# Patient Record
Sex: MALE | Race: BLACK OR AFRICAN AMERICAN | NOT HISPANIC OR LATINO | Employment: OTHER | URBAN - METROPOLITAN AREA
[De-identification: names, ages, dates, MRNs, and addresses within clinical notes are randomized per-mention and may not be internally consistent; named-entity substitution may affect disease eponyms.]

---

## 2017-10-01 ENCOUNTER — OFFICE VISIT (OUTPATIENT)
Dept: URGENT CARE | Facility: CLINIC | Age: 72
End: 2017-10-01
Payer: MEDICARE

## 2017-10-01 VITALS
TEMPERATURE: 98 F | DIASTOLIC BLOOD PRESSURE: 77 MMHG | WEIGHT: 235 LBS | BODY MASS INDEX: 31.83 KG/M2 | SYSTOLIC BLOOD PRESSURE: 131 MMHG | OXYGEN SATURATION: 97 % | HEIGHT: 72 IN | HEART RATE: 96 BPM | RESPIRATION RATE: 18 BRPM

## 2017-10-01 DIAGNOSIS — J40 BRONCHITIS: Primary | ICD-10-CM

## 2017-10-01 PROCEDURE — 99203 OFFICE O/P NEW LOW 30 MIN: CPT | Mod: S$GLB,,, | Performed by: PHYSICIAN ASSISTANT

## 2017-10-01 RX ORDER — CEFUROXIME AXETIL 500 MG/1
500 TABLET ORAL 2 TIMES DAILY
COMMUNITY

## 2017-10-01 RX ORDER — PROMETHAZINE HYDROCHLORIDE 12.5 MG/1
12.5 SUPPOSITORY RECTAL EVERY 6 HOURS PRN
COMMUNITY
End: 2022-07-26

## 2017-10-01 RX ORDER — CIPROFLOXACIN 500 MG/5ML
KIT ORAL 2 TIMES DAILY
COMMUNITY

## 2017-10-01 RX ORDER — PROMETHAZINE HYDROCHLORIDE AND CODEINE PHOSPHATE 6.25; 1 MG/5ML; MG/5ML
5 SOLUTION ORAL EVERY 6 HOURS PRN
Qty: 150 ML | Refills: 0 | Status: SHIPPED | OUTPATIENT
Start: 2017-10-01 | End: 2017-10-11

## 2017-10-01 RX ORDER — NAPROXEN 375 MG/1
375 TABLET ORAL 2 TIMES DAILY WITH MEALS
COMMUNITY

## 2017-10-01 RX ORDER — METHYLPREDNISOLONE 4 MG/1
TABLET ORAL
Qty: 1 PACKAGE | Refills: 0 | Status: SHIPPED | OUTPATIENT
Start: 2017-10-01 | End: 2024-03-25

## 2017-10-01 RX ORDER — BENZONATATE 200 MG/1
200 CAPSULE ORAL 3 TIMES DAILY PRN
Qty: 30 CAPSULE | Refills: 0 | Status: SHIPPED | OUTPATIENT
Start: 2017-10-01 | End: 2017-10-11

## 2017-10-01 NOTE — PATIENT INSTRUCTIONS
- Please return here or go to the Emergency Department for any concerns or worsening of condition.   - If you were prescribed a narcotic medication, do not drive or operate heavy equipment or machinery while taking these medications.  - Please follow up with your primary care provider (PCP) or discussed specialist(s) as needed.         Acute Bronchitis  Your healthcare provider has told you that you have acute bronchitis. Bronchitis is infection or inflammation of the bronchial tubes (airways in the lungs). Normally, air moves easily in and out of the airways. Bronchitis narrows the airways, making it harder for air to flow in and out of the lungs. This causes symptoms such as shortness of breath, coughing up yellow or green mucus, and wheezing. Bronchitis can be acute or chronic. Acute means the condition comes on quickly and goes away in a short time, usually within 3 to 10 days. Chronic means a condition lasts a long time and often comes back.    What causes acute bronchitis?  Acute bronchitis almost always starts as a viral respiratory infection, such as a cold or the flu. Certain factors make it more likely for a cold or flu to turn into bronchitis. These include being very young, being elderly, having a heart or lung problem, or having a weak immune system. Cigarette smoking also makes bronchitis more likely.  When bronchitis develops, the airways become swollen. The airways may also become infected with bacteria. This is known as a secondary infection.  Diagnosing acute bronchitis  Your healthcare provider will examine you and ask about your symptoms and health history. You may also have a sputum culture to test the fluid in your lungs. Chest X-rays may be done to look for infection in the lungs.  Treating acute bronchitis  Bronchitis usually clears up as the cold or flu goes away. You can help feel better faster by doing the following:  · Take medicine as directed. You may be told to take ibuprofen or other  over-the-counter medicines. These help relieve inflammation in your bronchial tubes. Your healthcare provider may prescribe an inhaler to help open up the bronchial tubes. Most of the time, acute bronchitis is caused by a viral infection. Antibiotics are usually not prescribed for viral infections.  · Drink plenty of fluids, such as water, juice, or warm soup. Fluids loosen mucus so that you can cough it up. This helps you breathe more easily. Fluids also prevent dehydration.  · Make sure you get plenty of rest.  · Do not smoke. Do not allow anyone else to smoke in your home.  Recovery and follow-up  Follow up with your doctor as you are told. You will likely feel better in a week or two. But a dry cough can linger beyond that time. Let your doctor know if you still have symptoms (other than a dry cough) after 2 weeks, or if youre prone to getting bronchial infections. Take steps to protect yourself from future infections. These steps include stopping smoking and avoiding tobacco smoke, washing your hands often, and getting a yearly flu shot.  When to call your healthcare provider  Call the healthcare provider if you have any of the following:  · Fever of 100.4°F (38.0°C) or higher, or as advised  · Symptoms that get worse, or new symptoms  · Trouble breathing  · Symptoms that dont start to improve within a week, or within 3 days of taking antibiotics   Date Last Reviewed: 12/1/2016  © 9466-9054 [employer]. [address] [place] [address], [place] [address]. All rights reserved. This information is not intended as a substitute for professional medical care. Always follow your healthcare professional's instructions.

## 2017-10-01 NOTE — PROGRESS NOTES
Subjective:       Patient ID: Franko Smith is a 72 y.o. male.    Chief Complaint: Cough (2 weeks )    PT SEEN HIS PCP AND WAS GIVEN CIPRO AND COUGH MEDS  NOW HES TAKING CEFTIN AFTER THE CIPRO WASN'T WORKING       Cough   This is a new problem. The current episode started 1 to 4 weeks ago. The cough is productive of sputum. Associated symptoms include a sore throat and shortness of breath. Pertinent negatives include no chest pain, chills, ear pain, eye redness, fever, headaches, myalgias, rash or wheezing. He has tried prescription cough suppressant for the symptoms. The treatment provided no relief. There is no history of asthma, bronchitis or pneumonia.     Review of Systems   Constitution: Negative for chills, fever and malaise/fatigue.   HENT: Positive for sore throat. Negative for congestion, ear pain and hoarse voice.    Eyes: Negative for blurred vision, discharge, pain, redness and visual disturbance.   Cardiovascular: Negative for chest pain, dyspnea on exertion, leg swelling, near-syncope and syncope.   Respiratory: Positive for cough, shortness of breath and sputum production. Negative for wheezing.    Hematologic/Lymphatic: Negative for adenopathy.   Skin: Negative for itching and rash.   Musculoskeletal: Negative for back pain, myalgias, neck pain and stiffness.   Gastrointestinal: Negative for abdominal pain, diarrhea, nausea and vomiting.   Neurological: Negative for dizziness, headaches, light-headedness and numbness.   Psychiatric/Behavioral: Negative for altered mental status.   Allergic/Immunologic: Negative for hives.   All other systems reviewed and are negative.      Objective:      Physical Exam   Constitutional: He is oriented to person, place, and time. He appears well-developed and well-nourished.  Non-toxic appearance. He does not have a sickly appearance. He does not appear ill. No distress.   HENT:   Head: Normocephalic and atraumatic.   Right Ear: Tympanic membrane, external ear and  ear canal normal.   Left Ear: Tympanic membrane, external ear and ear canal normal.   Nose: No mucosal edema. No epistaxis. Right sinus exhibits no maxillary sinus tenderness and no frontal sinus tenderness. Left sinus exhibits no maxillary sinus tenderness and no frontal sinus tenderness.   Mouth/Throat: Uvula is midline and mucous membranes are normal. No uvula swelling. Posterior oropharyngeal erythema (mild) present. No oropharyngeal exudate or posterior oropharyngeal edema.   Eyes: Pupils are equal, round, and reactive to light.   Neck: Normal range of motion. Neck supple.   Cardiovascular: Normal rate, regular rhythm and normal heart sounds.  Exam reveals no gallop and no friction rub.    No murmur heard.  Pulmonary/Chest: Effort normal. No respiratory distress. He has no decreased breath sounds. He has no wheezes. He has rhonchi in the right middle field and the left middle field. He has no rales. He exhibits no tenderness and no crepitus.   Musculoskeletal: Normal range of motion.   Lymphadenopathy:        Head (right side): No submental, no submandibular, no tonsillar, no preauricular, no posterior auricular and no occipital adenopathy present.        Head (left side): No submental, no submandibular, no tonsillar, no preauricular, no posterior auricular and no occipital adenopathy present.     He has no cervical adenopathy.        Right cervical: No posterior cervical adenopathy present.       Left cervical: No posterior cervical adenopathy present.        Right: No supraclavicular adenopathy present.        Left: No supraclavicular adenopathy present.   Neurological: He is alert and oriented to person, place, and time. He is not disoriented. Coordination and gait normal.   Skin: No abrasion, no ecchymosis, no laceration and no rash noted. No erythema.   Psychiatric: He has a normal mood and affect. His behavior is normal.   Nursing note and vitals reviewed.      /77   Pulse 96   Temp 97.8 °F (36.6  °C)   Resp 18   Ht 6' (1.829 m)   Wt 106.6 kg (235 lb)   SpO2 97%   BMI 31.87 kg/m²      COMPARISON: None    FINDINGS: Two views of the chest. Pulmonary vasculature and hilar regions are within normal limits. Nonspecific elevation of the right hemidiaphragm. Bibasilar few scattered linear opacities consistent with subsegmental scarring versus atelectasis. The lungs are otherwise clear. No pleural effusion or pneumothorax. The heart and mediastinal contours are within normal limits for age. Included osseous structures show age-related degenerative change without acute process seen.   Impression   No radiographic acute intrathoracic process seen.      Electronically signed by: SERGE CARR MD, MD  Date: 10/01/17  Time: 12:05      Assessment:       1. Bronchitis        Plan:       Franko was seen today for cough.    Diagnoses and all orders for this visit:    Bronchitis  -     X-Ray Chest PA And Lateral; Future  -     methylPREDNISolone (MEDROL DOSEPACK) 4 mg tablet; use as directed  -     promethazine-codeine 6.25-10 mg/5 ml (PHENERGAN WITH CODEINE) 6.25-10 mg/5 mL syrup; Take 5 mLs by mouth every 6 (six) hours as needed for Cough.  -     benzonatate (TESSALON) 200 MG capsule; Take 1 capsule (200 mg total) by mouth 3 (three) times daily as needed for Cough.    - Please return here or go to the Emergency Department for any concerns or worsening of condition.   - If you were prescribed a narcotic medication, do not drive or operate heavy equipment or machinery while taking these medications.  - Please follow up with your primary care provider (PCP) or discussed specialist(s) as needed.

## 2017-10-04 ENCOUNTER — TELEPHONE (OUTPATIENT)
Dept: URGENT CARE | Facility: CLINIC | Age: 72
End: 2017-10-04

## 2022-07-26 ENCOUNTER — TELEPHONE (OUTPATIENT)
Dept: URGENT CARE | Facility: CLINIC | Age: 77
End: 2022-07-26

## 2022-07-26 ENCOUNTER — OFFICE VISIT (OUTPATIENT)
Dept: URGENT CARE | Facility: CLINIC | Age: 77
End: 2022-07-26
Payer: MEDICARE

## 2022-07-26 VITALS
OXYGEN SATURATION: 98 % | SYSTOLIC BLOOD PRESSURE: 120 MMHG | HEIGHT: 72 IN | TEMPERATURE: 97 F | BODY MASS INDEX: 31.83 KG/M2 | RESPIRATION RATE: 16 BRPM | WEIGHT: 235 LBS | HEART RATE: 87 BPM | DIASTOLIC BLOOD PRESSURE: 72 MMHG

## 2022-07-26 DIAGNOSIS — B34.9 ACUTE VIRAL SYNDROME: Primary | ICD-10-CM

## 2022-07-26 DIAGNOSIS — Z20.822 CLOSE EXPOSURE TO COVID-19 VIRUS: ICD-10-CM

## 2022-07-26 PROBLEM — F40.240 CLAUSTROPHOBIA: Status: ACTIVE | Noted: 2022-07-26

## 2022-07-26 PROBLEM — H93.19 TINNITUS: Status: ACTIVE | Noted: 2022-07-26

## 2022-07-26 PROBLEM — H90.5 SENSORINEURAL HEARING LOSS, UNILATERAL: Status: ACTIVE | Noted: 2022-07-26

## 2022-07-26 PROBLEM — N40.0 BENIGN PROSTATIC HYPERPLASIA: Status: ACTIVE | Noted: 2022-07-26

## 2022-07-26 PROBLEM — F41.9 ANXIETY DISORDER: Status: ACTIVE | Noted: 2022-07-26

## 2022-07-26 PROBLEM — G47.30 SLEEP APNEA: Status: ACTIVE | Noted: 2022-07-26

## 2022-07-26 LAB
CTP QC/QA: YES
SARS-COV-2 RDRP RESP QL NAA+PROBE: NEGATIVE

## 2022-07-26 PROCEDURE — 1159F PR MEDICATION LIST DOCUMENTED IN MEDICAL RECORD: ICD-10-PCS | Mod: CPTII,S$GLB,, | Performed by: STUDENT IN AN ORGANIZED HEALTH CARE EDUCATION/TRAINING PROGRAM

## 2022-07-26 PROCEDURE — 3074F PR MOST RECENT SYSTOLIC BLOOD PRESSURE < 130 MM HG: ICD-10-PCS | Mod: CPTII,S$GLB,, | Performed by: STUDENT IN AN ORGANIZED HEALTH CARE EDUCATION/TRAINING PROGRAM

## 2022-07-26 PROCEDURE — 1160F PR REVIEW ALL MEDS BY PRESCRIBER/CLIN PHARMACIST DOCUMENTED: ICD-10-PCS | Mod: CPTII,S$GLB,, | Performed by: STUDENT IN AN ORGANIZED HEALTH CARE EDUCATION/TRAINING PROGRAM

## 2022-07-26 PROCEDURE — 99214 OFFICE O/P EST MOD 30 MIN: CPT | Mod: S$GLB,,, | Performed by: STUDENT IN AN ORGANIZED HEALTH CARE EDUCATION/TRAINING PROGRAM

## 2022-07-26 PROCEDURE — 3078F PR MOST RECENT DIASTOLIC BLOOD PRESSURE < 80 MM HG: ICD-10-PCS | Mod: CPTII,S$GLB,, | Performed by: STUDENT IN AN ORGANIZED HEALTH CARE EDUCATION/TRAINING PROGRAM

## 2022-07-26 PROCEDURE — 1125F PR PAIN SEVERITY QUANTIFIED, PAIN PRESENT: ICD-10-PCS | Mod: CPTII,S$GLB,, | Performed by: STUDENT IN AN ORGANIZED HEALTH CARE EDUCATION/TRAINING PROGRAM

## 2022-07-26 PROCEDURE — 3078F DIAST BP <80 MM HG: CPT | Mod: CPTII,S$GLB,, | Performed by: STUDENT IN AN ORGANIZED HEALTH CARE EDUCATION/TRAINING PROGRAM

## 2022-07-26 PROCEDURE — 3074F SYST BP LT 130 MM HG: CPT | Mod: CPTII,S$GLB,, | Performed by: STUDENT IN AN ORGANIZED HEALTH CARE EDUCATION/TRAINING PROGRAM

## 2022-07-26 PROCEDURE — U0002: ICD-10-PCS | Mod: QW,S$GLB,, | Performed by: STUDENT IN AN ORGANIZED HEALTH CARE EDUCATION/TRAINING PROGRAM

## 2022-07-26 PROCEDURE — 1159F MED LIST DOCD IN RCRD: CPT | Mod: CPTII,S$GLB,, | Performed by: STUDENT IN AN ORGANIZED HEALTH CARE EDUCATION/TRAINING PROGRAM

## 2022-07-26 PROCEDURE — 1160F RVW MEDS BY RX/DR IN RCRD: CPT | Mod: CPTII,S$GLB,, | Performed by: STUDENT IN AN ORGANIZED HEALTH CARE EDUCATION/TRAINING PROGRAM

## 2022-07-26 PROCEDURE — U0002 COVID-19 LAB TEST NON-CDC: HCPCS | Mod: QW,S$GLB,, | Performed by: STUDENT IN AN ORGANIZED HEALTH CARE EDUCATION/TRAINING PROGRAM

## 2022-07-26 PROCEDURE — 1125F AMNT PAIN NOTED PAIN PRSNT: CPT | Mod: CPTII,S$GLB,, | Performed by: STUDENT IN AN ORGANIZED HEALTH CARE EDUCATION/TRAINING PROGRAM

## 2022-07-26 PROCEDURE — 99214 PR OFFICE/OUTPT VISIT, EST, LEVL IV, 30-39 MIN: ICD-10-PCS | Mod: S$GLB,,, | Performed by: STUDENT IN AN ORGANIZED HEALTH CARE EDUCATION/TRAINING PROGRAM

## 2022-07-26 RX ORDER — ONDANSETRON 4 MG/1
4 TABLET, FILM COATED ORAL EVERY 6 HOURS PRN
Qty: 15 TABLET | Refills: 0 | Status: SHIPPED | OUTPATIENT
Start: 2022-07-26 | End: 2022-07-26 | Stop reason: RX

## 2022-07-26 RX ORDER — ONDANSETRON 4 MG/1
4 TABLET, ORALLY DISINTEGRATING ORAL
Status: COMPLETED | OUTPATIENT
Start: 2022-07-26 | End: 2022-07-26

## 2022-07-26 RX ORDER — PROMETHAZINE HYDROCHLORIDE 12.5 MG/1
12.5 TABLET ORAL EVERY 6 HOURS PRN
Qty: 15 TABLET | Refills: 0 | Status: SHIPPED | OUTPATIENT
Start: 2022-07-26

## 2022-07-26 RX ADMIN — ONDANSETRON 4 MG: 4 TABLET, ORALLY DISINTEGRATING ORAL at 12:07

## 2022-07-26 NOTE — PATIENT INSTRUCTIONS
Below are suggestions for symptomatic relief of your upper respiratory symptoms:              -Salt water gargles to soothe throat pain.              -Chloroseptic spray and Cepacol lozenges also help to numb throat pain.              -Warm herbal teas with honey/lemon/judi can help soothe sore throat and hoarseness              -Nasal saline spray reduces inflammation and dryness.              -Warm face compresses to help with facial sinus pain/pressure.              -Humidifiers and steam can help with nasal dryness and congestion              -Vicks vapor rub at night.              -Flonase OTC or Nasacort OTC for nasal congestion and post-nasal drip. Ok to use twice daily for the first week, then reduce to once daily after symptoms have begun to improve.              -Afrin is a nasal spray that can give immediate relief of nasal congestion but you cannot use this medication for more than 3 days              -Simple foods like chicken noodle soup.              - Mucinex for congestion or Mucinex DM for cough during the day time. Delsym helps with coughing at night. Mucinex-D if you have sinus pressure/sinus pain or chest congestion. (caution if history of high blood pressure or palpitations). You must increase your water intake when using expectorants (Mucinex).              -Zyrtec/Claritin/Allegra/Xyzal should help with allergies.  -If you DO NOT have Hypertension or any history of palpitations, it is ok to take over the counter Sudafed or Mucinex D or Allegra-D or Claritin-D or Zyrtec-D.  -If you do take one of the above, it is ok to combine that with plain over the counter Mucinex or Allegra or Claritin or Zyrtec. If, for example, you are taking Zyrtec -D, you can combine that with Mucinex, but not Mucinex-D.  If you are taking Mucinex-D, you can combine that with plain Allegra or Claritin or Zyrtec.   -If you DO have Hypertension or palpitations, it is safe to take Coricidin HBP for relief of sinus  symptoms.      Your test was NEGATIVE for COVID-19 (coronavirus).      You may leave home and/or return to work when the following conditions are met:  24 hours fever free without fever-reducing medications AND  Improved symptoms  You are fully vaccinated or have not had close contact with someone with COVID-19 (within 6 feet for 15 minutes or more)    If you are fully vaccinated and had a close contact, there is no need for quarantine, unless you develop symptoms. Test 3-5 days after exposure and continue to wear your mask and distance from others. If you develop symptoms, you should isolate and get tested at symptom onset or 3-5 days post exposure.      If you are not fully vaccinated and had a close contact:  Retest at 5 to 7 days post-exposure  If possible, it is recommended that you quarantine for 5 days from the time of contact regardless of your test status.  A mask should be worn indoors post quarantine for 5 more days.    If your symptoms worsen or if you have any other concerns, please contact [facility] at [phone].

## 2022-07-26 NOTE — TELEPHONE ENCOUNTER
Called patient let know that a new medication was sent in since the zofran required a prior auth.

## 2022-07-28 ENCOUNTER — OFFICE VISIT (OUTPATIENT)
Dept: URGENT CARE | Facility: CLINIC | Age: 77
End: 2022-07-28
Payer: MEDICARE

## 2022-07-28 VITALS
BODY MASS INDEX: 31.83 KG/M2 | OXYGEN SATURATION: 99 % | WEIGHT: 235 LBS | TEMPERATURE: 98 F | SYSTOLIC BLOOD PRESSURE: 118 MMHG | HEIGHT: 72 IN | HEART RATE: 88 BPM | DIASTOLIC BLOOD PRESSURE: 73 MMHG | RESPIRATION RATE: 17 BRPM

## 2022-07-28 DIAGNOSIS — R53.83 FATIGUE, UNSPECIFIED TYPE: ICD-10-CM

## 2022-07-28 DIAGNOSIS — J02.9 SORE THROAT: ICD-10-CM

## 2022-07-28 DIAGNOSIS — R09.82 POST-NASAL DRIP: ICD-10-CM

## 2022-07-28 DIAGNOSIS — Z86.69 HISTORY OF SLEEP APNEA: ICD-10-CM

## 2022-07-28 DIAGNOSIS — R51.9 ACUTE NONINTRACTABLE HEADACHE, UNSPECIFIED HEADACHE TYPE: ICD-10-CM

## 2022-07-28 DIAGNOSIS — R11.0 NAUSEA: ICD-10-CM

## 2022-07-28 DIAGNOSIS — R05.9 COUGH: Primary | ICD-10-CM

## 2022-07-28 DIAGNOSIS — J34.3 HYPERTROPHY OF NASAL TURBINATES: ICD-10-CM

## 2022-07-28 PROBLEM — E65 CENTRAL OBESITY: Status: ACTIVE | Noted: 2022-07-28

## 2022-07-28 PROBLEM — K76.0 FATTY LIVER: Status: ACTIVE | Noted: 2022-07-28

## 2022-07-28 PROBLEM — I10 PRIMARY HYPERTENSION: Status: ACTIVE | Noted: 2022-07-28

## 2022-07-28 PROBLEM — E11.40 TYPE 2 DIABETES MELLITUS WITH DIABETIC NEUROPATHY, UNSPECIFIED: Status: ACTIVE | Noted: 2022-07-28

## 2022-07-28 PROBLEM — B35.1 TINEA UNGUIUM: Status: ACTIVE | Noted: 2022-07-28

## 2022-07-28 PROBLEM — E11.59 TYPE 2 DIABETES MELLITUS WITH OTHER CIRCULATORY COMPLICATIONS: Status: ACTIVE | Noted: 2022-07-28

## 2022-07-28 PROBLEM — Z23 ENCOUNTER FOR IMMUNIZATION: Status: ACTIVE | Noted: 2022-07-28

## 2022-07-28 PROBLEM — E11.9 TYPE 2 DIABETES MELLITUS WITHOUT COMPLICATIONS: Status: ACTIVE | Noted: 2022-07-28

## 2022-07-28 PROBLEM — Z76.0 ENCOUNTER FOR ISSUE OF REPEAT PRESCRIPTION: Status: ACTIVE | Noted: 2022-07-28

## 2022-07-28 PROBLEM — K76.0 FATTY (CHANGE OF) LIVER, NOT ELSEWHERE CLASSIFIED: Status: ACTIVE | Noted: 2022-07-28

## 2022-07-28 PROBLEM — F41.1 GENERALIZED ANXIETY DISORDER: Status: ACTIVE | Noted: 2022-07-28

## 2022-07-28 PROBLEM — H60.91 UNSPECIFIED OTITIS EXTERNA, RIGHT EAR: Status: ACTIVE | Noted: 2022-07-28

## 2022-07-28 PROBLEM — E11.9 DIABETES MELLITUS: Status: ACTIVE | Noted: 2022-07-28

## 2022-07-28 PROBLEM — R60.9 EDEMA, UNSPECIFIED: Status: ACTIVE | Noted: 2022-07-28

## 2022-07-28 PROBLEM — E78.5 HYPERLIPIDEMIA: Status: ACTIVE | Noted: 2022-07-28

## 2022-07-28 PROBLEM — F63.0 COMPULSIVE GAMBLING: Status: ACTIVE | Noted: 2022-07-28

## 2022-07-28 PROBLEM — E11.3291: Status: ACTIVE | Noted: 2022-07-28

## 2022-07-28 PROBLEM — H47.091 OTHER DISORDERS OF OPTIC NERVE, NOT ELSEWHERE CLASSIFIED, RIGHT EYE: Status: ACTIVE | Noted: 2022-07-28

## 2022-07-28 PROBLEM — K21.9 GASTRO-ESOPHAGEAL REFLUX DISEASE WITHOUT ESOPHAGITIS: Status: ACTIVE | Noted: 2022-07-28

## 2022-07-28 LAB
CTP QC/QA: YES
SARS-COV-2 RDRP RESP QL NAA+PROBE: NEGATIVE

## 2022-07-28 PROCEDURE — 3074F PR MOST RECENT SYSTOLIC BLOOD PRESSURE < 130 MM HG: ICD-10-PCS | Mod: CPTII,S$GLB,, | Performed by: NURSE PRACTITIONER

## 2022-07-28 PROCEDURE — 1160F PR REVIEW ALL MEDS BY PRESCRIBER/CLIN PHARMACIST DOCUMENTED: ICD-10-PCS | Mod: CPTII,S$GLB,, | Performed by: NURSE PRACTITIONER

## 2022-07-28 PROCEDURE — 1125F PR PAIN SEVERITY QUANTIFIED, PAIN PRESENT: ICD-10-PCS | Mod: CPTII,S$GLB,, | Performed by: NURSE PRACTITIONER

## 2022-07-28 PROCEDURE — U0002: ICD-10-PCS | Mod: QW,S$GLB,, | Performed by: NURSE PRACTITIONER

## 2022-07-28 PROCEDURE — 1159F MED LIST DOCD IN RCRD: CPT | Mod: CPTII,S$GLB,, | Performed by: NURSE PRACTITIONER

## 2022-07-28 PROCEDURE — 3078F DIAST BP <80 MM HG: CPT | Mod: CPTII,S$GLB,, | Performed by: NURSE PRACTITIONER

## 2022-07-28 PROCEDURE — 99213 OFFICE O/P EST LOW 20 MIN: CPT | Mod: S$GLB,,, | Performed by: NURSE PRACTITIONER

## 2022-07-28 PROCEDURE — 1159F PR MEDICATION LIST DOCUMENTED IN MEDICAL RECORD: ICD-10-PCS | Mod: CPTII,S$GLB,, | Performed by: NURSE PRACTITIONER

## 2022-07-28 PROCEDURE — 1160F RVW MEDS BY RX/DR IN RCRD: CPT | Mod: CPTII,S$GLB,, | Performed by: NURSE PRACTITIONER

## 2022-07-28 PROCEDURE — 1125F AMNT PAIN NOTED PAIN PRSNT: CPT | Mod: CPTII,S$GLB,, | Performed by: NURSE PRACTITIONER

## 2022-07-28 PROCEDURE — 99213 PR OFFICE/OUTPT VISIT, EST, LEVL III, 20-29 MIN: ICD-10-PCS | Mod: S$GLB,,, | Performed by: NURSE PRACTITIONER

## 2022-07-28 PROCEDURE — U0002 COVID-19 LAB TEST NON-CDC: HCPCS | Mod: QW,S$GLB,, | Performed by: NURSE PRACTITIONER

## 2022-07-28 PROCEDURE — 3078F PR MOST RECENT DIASTOLIC BLOOD PRESSURE < 80 MM HG: ICD-10-PCS | Mod: CPTII,S$GLB,, | Performed by: NURSE PRACTITIONER

## 2022-07-28 PROCEDURE — 3074F SYST BP LT 130 MM HG: CPT | Mod: CPTII,S$GLB,, | Performed by: NURSE PRACTITIONER

## 2022-07-28 RX ORDER — FLUTICASONE PROPIONATE 50 MCG
2 SPRAY, SUSPENSION (ML) NASAL DAILY PRN
Qty: 15.8 ML | Refills: 0 | Status: SHIPPED | OUTPATIENT
Start: 2022-07-28

## 2022-07-28 NOTE — PROGRESS NOTES
Subjective:       Patient ID: Franko Smith is a 76 y.o. male.    Vitals:  height is 6' (1.829 m) and weight is 106.6 kg (235 lb). His temperature is 98.1 °F (36.7 °C). His blood pressure is 118/73 and his pulse is 88. His respiration is 17 and oxygen saturation is 99%.     Chief Complaint: Cough    This is a 76 y.o. male who presents today with a chief complaint of cough, sore throat, fatigue, headaches, and upset stomach x 2 days ago. Pt states that they came here on 07/26 to get tested for covid and it was Negative. They were told to come back to get re tested. Treated with tylenol.       76-year-old male presents to clinic with complaints of cough, sore throat, fatigue, headache and nausea. Urgent care visit 2 days ago with complaints of sore throat, diarrhea, vomiting, nausea, throat pain and headache that started 4 hours prior to urgent care visit and was diagnosed with acute viral syndrome. Treated with Phenergan 12.5 mg every 6 hours # 15 tablets. He reported his spouse tested positive for covid 2 weeks prior to his visit to urgent care.  History positive for Pfizer covid vaccine x 4 doses 1/22/21, 2/12/21, 10/6/21, 4/5/22, sleep apnea, GERD    Cough  This is a new problem. The current episode started in the past 7 days. The problem has been gradually worsening. The problem occurs constantly. The cough is productive of sputum. Associated symptoms include headaches, postnasal drip and a sore throat. Pertinent negatives include no chills, fever, myalgias, nasal congestion or rash. Associated symptoms comments: Upset stomach, fatigue. Treatments tried: tylenol. The treatment provided mild relief. There is no history of asthma, bronchitis or COPD.       Constitution: Positive for fatigue. Negative for chills, sweating and fever.   HENT: Positive for congestion, postnasal drip and sore throat.    Neck: Negative for neck pain and painful lymph nodes.   Respiratory: Positive for sleep apnea and cough.         Not  using CPAP   Gastrointestinal: Positive for nausea.   Musculoskeletal: Negative for muscle ache.   Skin: Negative for rash.   Allergic/Immunologic: Negative for seasonal allergies and sneezing.   Neurological: Positive for headaches. Negative for disorientation and altered mental status.   Hematologic/Lymphatic: Negative for swollen lymph nodes.   Psychiatric/Behavioral: Negative for altered mental status, disorientation and confusion.       Objective:      Physical Exam   Constitutional: He is oriented to person, place, and time. He appears well-developed. He is cooperative.  Non-toxic appearance. He does not appear ill. No distress. obesity  HENT:   Head: Normocephalic and atraumatic.   Ears:   Right Ear: Hearing, tympanic membrane, external ear and ear canal normal.   Left Ear: Hearing, tympanic membrane, external ear and ear canal normal.   Nose: Mucosal edema present. No rhinorrhea or nasal deformity. No epistaxis. Right sinus exhibits no maxillary sinus tenderness and no frontal sinus tenderness. Left sinus exhibits no maxillary sinus tenderness and no frontal sinus tenderness.   Mouth/Throat: Uvula is midline, oropharynx is clear and moist and mucous membranes are normal. No trismus in the jaw. Normal dentition. No uvula swelling. No oropharyngeal exudate, posterior oropharyngeal edema or posterior oropharyngeal erythema.   Eyes: Conjunctivae and lids are normal. No scleral icterus.   Neck: Trachea normal and phonation normal. Neck supple. No edema present. No erythema present. No neck rigidity present.   Cardiovascular: Normal rate, regular rhythm, normal heart sounds and normal pulses.   Pulmonary/Chest: Effort normal and breath sounds normal. No respiratory distress. He has no decreased breath sounds. He has no rhonchi.   Abdominal: Normal appearance.   Musculoskeletal: Normal range of motion.         General: No deformity. Normal range of motion.   Neurological: He is alert and oriented to person, place,  and time. He exhibits normal muscle tone. Coordination normal.   Skin: Skin is warm, dry, intact, not diaphoretic and not pale.   Psychiatric: His speech is normal and behavior is normal. Judgment and thought content normal.   Nursing note and vitals reviewed.        Assessment:       1. Cough    2. Sore throat    3. Fatigue, unspecified type    4. Nausea    5. Acute nonintractable headache, unspecified headache type    6. Hypertrophy of nasal turbinates    7. Post-nasal drip    8. History of sleep apnea        Results for orders placed or performed in visit on 07/28/22   POCT COVID-19 Rapid Screening   Result Value Ref Range    POC Rapid COVID Negative Negative     Acceptable Yes      Plan:         Cough  -     POCT COVID-19 Rapid Screening    Sore throat    Fatigue, unspecified type    Nausea    Acute nonintractable headache, unspecified headache type    Hypertrophy of nasal turbinates  -     fluticasone propionate (FLONASE) 50 mcg/actuation nasal spray; 2 sprays (100 mcg total) by Each Nostril route daily as needed.  Dispense: 15.8 mL; Refill: 0    Post-nasal drip  -     fluticasone propionate (FLONASE) 50 mcg/actuation nasal spray; 2 sprays (100 mcg total) by Each Nostril route daily as needed.  Dispense: 15.8 mL; Refill: 0    History of sleep apnea         Patient Instructions   Reviewed negative COVID-19 virus test with patient who verbalized understanding.  Advised patient that his symptoms are indicative of an upper respiratory infection which is viral in nature and should be treated symptomatically.  We discussed over-the-counter medications as well as home remedies to help with current symptoms.    Patient educational handouts also included in discharge paperwork for patient who verbalized understanding agrees with plan of care.  He denies any further questions or concerns at this time.  Patient exits exam room in no acute distress.     [facility]

## 2022-07-28 NOTE — PATIENT INSTRUCTIONS
Reviewed negative COVID-19 virus test with patient who verbalized understanding.  Advised patient that his symptoms are indicative of an upper respiratory infection which is viral in nature and should be treated symptomatically.  We discussed over-the-counter medications as well as home remedies to help with current symptoms.    Patient educational handouts also included in discharge paperwork for patient who verbalized understanding agrees with plan of care.  He denies any further questions or concerns at this time.  Patient exits exam room in no acute distress.     [facility] as soon as possible.

## 2023-08-31 ENCOUNTER — TELEPHONE (OUTPATIENT)
Dept: HEMATOLOGY/ONCOLOGY | Facility: CLINIC | Age: 78
End: 2023-08-31
Payer: OTHER GOVERNMENT

## 2023-08-31 NOTE — TELEPHONE ENCOUNTER
----- Message from Bailey Magana sent at 8/30/2023  8:47 AM CDT -----  Regarding: RE: appt  Contact: [phone]  Patient wife called: [facility] with slightly [facility] PSA mri pet ct and bx; will collect all and report back to me tomorrow. Aimed for having records emailed and images on disc.   ----- Message -----  From: Keily Blackburn RN  Sent: 8/28/2023   4:31 PM CDT  To: Bailey Magana  Subject: FW: appt                                         Hi,    Would you be able to contact this patient for records and I will schedule him thereafter?    Keily    ----- Message -----  From: Lisa Summers RN  Sent: 8/25/2023  12:51 PM CDT  To: Keily Blackburn RN  Subject: FW: appt                                         Can you please reach out to this pt    Lisa RODRIGUES  ----- Message -----  From: Zane Benton  Sent: 8/25/2023  10:55 AM CDT  To: Gautam DESAI Staff  Subject: appt                                             Pt is calling for an appt with the provider would like to be seen for prostate cancer. Please call No available dates.

## 2023-08-31 NOTE — TELEPHONE ENCOUNTER
Attempted to contact patient for scheduling. No answer. Detailed voicemail with Oncology Nurse Navigator's direct contact number provided for return call.

## 2023-09-01 ENCOUNTER — TELEPHONE (OUTPATIENT)
Dept: UROLOGY | Facility: CLINIC | Age: 78
End: 2023-09-01
Payer: OTHER GOVERNMENT

## 2023-09-08 NOTE — TELEPHONE ENCOUNTER
Oncology nurse navigator spoke to patient regarding self referral. Patient states [facility] will [facility] with [facility] for [facility] and call us if needed.

## 2023-09-11 ENCOUNTER — TELEPHONE (OUTPATIENT)
Dept: UROLOGY | Facility: CLINIC | Age: 78
End: 2023-09-11
Payer: OTHER GOVERNMENT

## 2023-09-11 NOTE — TELEPHONE ENCOUNTER
Additional call made to Mrs. Smith for assistance with records. States they will work on obtaining the records.

## 2023-09-11 NOTE — TELEPHONE ENCOUNTER
Incoming call regarding second opinion referral. Patient states that he does want to see Dr. Florez at the latter part of next week. Patient scheduled for 9/22 8am. Date, time, location discussed. Contact information reviewed for additional concerns.

## 2023-09-15 ENCOUNTER — TELEPHONE (OUTPATIENT)
Dept: HEMATOLOGY/ONCOLOGY | Facility: CLINIC | Age: 78
End: 2023-09-15
Payer: OTHER GOVERNMENT

## 2023-09-15 NOTE — TELEPHONE ENCOUNTER
Patient's wife called asking if imaging disc has arrived from the [facility]. No imaging disc received. Mr. Smith states that she will ask her  to  the disc himself and bring it over. Encouraged Mrs. Smith to call if we can help in any way.

## 2023-09-20 NOTE — PROGRESS NOTES
"Clinic Note  9/20/2023      Subjective:         Chief Complaint:   HPI  Franko Smith is a 78 y.o. male diagnosed with high risk prostate cancer.  Uses cialis for erectile dysfunction.Here with wife Virginia. [id number]-DJ, HTN, obesity.  [place], , semi-retired. Now doing mediations and arbitrations. One of 12 kids. He has 4 kids. Virginia is a realtor.    FH - positive-son  PSA - 4.61  AJCC Stage - T1C  STAR CAP stage- IIC  Volume - 54 ccs  MRI - 5/23/2023- 1.4 cm RMPZ PI-RADS 4 lesion.MRI negative for EPE (extraprostatic extension), NVBI (neurovascular bundle involvement), SVI (seminal vesicle involvement), or nodes.   Biopsy - fusion-7/25/2023- [place] 3+5 (GG4) right base 25%; Mode 4+3 BOB 4 5%; Mode 3+4 right lateral base 40%, right middle 10%, BOB 3 30%; Mode 3+3 right lateral apex 20%. Overall 7/16 sites, 14/24 cores.  PSMA scan-8/24/2023- no avid nodes or metastasis.  SHAD Score - 5 intermediate risk  NCCN - high risk  Germline testing - indicated  Somatic testing - not usually indicated in high risk disease  Star Cap-      No results found for: "PSA", "PSADIAG", "PSATOTAL", "PSAFREE", "PSAFREEPCT"   Past Medical History:   Diagnosis Date    Anxiety     Diabetes mellitus, type 2     GERD (gastroesophageal reflux disease)     Hyperlipidemia     Hypertension     Obesity (BMI 30-39.9)      Family History   Problem Relation Age of Onset    Hypertension Mother     Hyperlipidemia Mother     Cancer Father     Diabetes Sister     Diabetes Brother     Stroke Neg Hx     Heart disease Neg Hx      Social History     Socioeconomic History    Marital status:     Number of children: 5   Occupational History    Occupation: Retired     Tobacco Use    Smoking status: Never    Smokeless tobacco: Never   Substance and Sexual Activity    Alcohol use: Yes     Alcohol/week: 0.0 standard drinks of alcohol     Comment: social    Drug use: No    Sexual activity: Yes     Partners: Female     Past " Surgical History:   Procedure Laterality Date    HERNIA REPAIR      ULNAR NERVE REPAIR Left      Patient Active Problem List   Diagnosis    Type 2 diabetes mellitus with diabetic polyneuropathy    Essential hypertension    Hypercholesteremia    Obesity (BMI 30-39.9)    Gastroesophageal reflux disease    Erectile dysfunction    Claustrophobia    Benign prostatic hyperplasia    Anxiety disorder    Sleep apnea    Tinnitus    Sensorineural hearing loss, unilateral    Central obesity    Compulsive gambling    Edema, unspecified    Encounter for immunization    Encounter for issue of repeat prescription    Fatty (change of) liver, not elsewhere classified    Fatty liver    Other disorders of optic nerve, not elsewhere classified, right eye    Tinea unguium    Unspecified otitis externa, right ear    Type 2 diabetes mellitus with diabetic neuropathy, unspecified    Generalized anxiety disorder    Primary hypertension    Gastro-esophageal reflux disease without esophagitis    Hyperlipidemia    Diabetes mellitus    Type 2 diabetes mellitus with mild nonproliferative diabetic retinopathy without macular edema, right eye    Type 2 diabetes mellitus with other circulatory complications    Type 2 diabetes mellitus without complications     Review of Systems      Objective:      There were no vitals taken for this visit.  Estimated body mass index is 31.87 kg/m² as calculated from the following:    Height as of 7/28/22: 6' (1.829 m).    Weight as of 7/28/22: 106.6 kg (235 lb).  Physical Exam      Assessment and Plan:           Problem List Items Addressed This Visit    None  Visit Diagnoses       Prostate cancer    -  Primary            Follow up:   Today's visit was spent almost entirely on counseling. We reviewed his diagnosis, stage, grade, risk group, and prognosis. We discussed [place] (NCCN) and SHAD risk stratification  We discussed the concept of low risk, intermediate risk, and high risk disease. We also reviewed the  NCCN treatment nomogram.We discussed the different treatment options including active surveillance (as well as the surveillance protocol), prostate brachytherapy, EBRT, SBRT (stereotactic body radiation therapy),cryotherapy, HIFU and both open and robotic prostatectomy.We also discussed the advantages, disadvantages, risks and benefits, as well as complications of each option. Regarding radiation therapy we discussed treatment planning, the different techniques, short and long term complications. These included radiation cystitis, radiation proctitis, and impotence. We discussed success, failure, and salvage therapeutic options.Also discussed the use of SpaceOAR for brachytherapy and EBRT and fiducials for EBRT.   We discussed surgical therapy in depth including preoperative preparation, surgical technique (including bladder neck and nerve-sparing techniques), postoperative recuperation and recovery, and short and long term complications including UTI, bleeding, blood clots,catheter dislodgement, etc. We discussed the risks of reoperation, incontinence, impotence, and recurrence. We discussed preop and postop Kegels, post op penile rehab, and treatment options for incontinence and impotence. We discussed rates of cancer free survival and recurrence, as well as salvage therapeutic options. We discussed the possible  indications for adjuvant radiation therapy.  Erasto Bacon consult to discuss germline testing.  Recommend ADT+EBRT.  Consult Dr Zamora.    Roderick Florez

## 2023-09-21 ENCOUNTER — PATIENT MESSAGE (OUTPATIENT)
Dept: HEMATOLOGY/ONCOLOGY | Facility: CLINIC | Age: 78
End: 2023-09-21
Payer: OTHER GOVERNMENT

## 2023-09-21 ENCOUNTER — OFFICE VISIT (OUTPATIENT)
Dept: UROLOGY | Facility: CLINIC | Age: 78
End: 2023-09-21
Payer: OTHER GOVERNMENT

## 2023-09-21 VITALS
HEART RATE: 77 BPM | BODY MASS INDEX: 30.75 KG/M2 | SYSTOLIC BLOOD PRESSURE: 127 MMHG | DIASTOLIC BLOOD PRESSURE: 74 MMHG | WEIGHT: 227 LBS | HEIGHT: 72 IN

## 2023-09-21 DIAGNOSIS — I10 ESSENTIAL HYPERTENSION: ICD-10-CM

## 2023-09-21 DIAGNOSIS — C61 PROSTATE CANCER: Primary | ICD-10-CM

## 2023-09-21 DIAGNOSIS — E66.9 OBESITY (BMI 30-39.9): ICD-10-CM

## 2023-09-21 DIAGNOSIS — E11.42 TYPE 2 DIABETES MELLITUS WITH DIABETIC POLYNEUROPATHY, WITHOUT LONG-TERM CURRENT USE OF INSULIN: ICD-10-CM

## 2023-09-21 PROCEDURE — 3074F SYST BP LT 130 MM HG: CPT | Mod: CPTII,S$GLB,, | Performed by: UROLOGY

## 2023-09-21 PROCEDURE — 1159F PR MEDICATION LIST DOCUMENTED IN MEDICAL RECORD: ICD-10-PCS | Mod: CPTII,S$GLB,, | Performed by: UROLOGY

## 2023-09-21 PROCEDURE — 1126F PR PAIN SEVERITY QUANTIFIED, NO PAIN PRESENT: ICD-10-PCS | Mod: CPTII,S$GLB,, | Performed by: UROLOGY

## 2023-09-21 PROCEDURE — 3288F PR FALLS RISK ASSESSMENT DOCUMENTED: ICD-10-PCS | Mod: CPTII,S$GLB,, | Performed by: UROLOGY

## 2023-09-21 PROCEDURE — 3288F FALL RISK ASSESSMENT DOCD: CPT | Mod: CPTII,S$GLB,, | Performed by: UROLOGY

## 2023-09-21 PROCEDURE — 1159F MED LIST DOCD IN RCRD: CPT | Mod: CPTII,S$GLB,, | Performed by: UROLOGY

## 2023-09-21 PROCEDURE — 99999 PR PBB SHADOW E&M-EST. PATIENT-LVL IV: ICD-10-PCS | Mod: PBBFAC,,, | Performed by: UROLOGY

## 2023-09-21 PROCEDURE — 99205 PR OFFICE/OUTPT VISIT, NEW, LEVL V, 60-74 MIN: ICD-10-PCS | Mod: S$GLB,,, | Performed by: UROLOGY

## 2023-09-21 PROCEDURE — 3074F PR MOST RECENT SYSTOLIC BLOOD PRESSURE < 130 MM HG: ICD-10-PCS | Mod: CPTII,S$GLB,, | Performed by: UROLOGY

## 2023-09-21 PROCEDURE — 1126F AMNT PAIN NOTED NONE PRSNT: CPT | Mod: CPTII,S$GLB,, | Performed by: UROLOGY

## 2023-09-21 PROCEDURE — 3078F DIAST BP <80 MM HG: CPT | Mod: CPTII,S$GLB,, | Performed by: UROLOGY

## 2023-09-21 PROCEDURE — 99999 PR PBB SHADOW E&M-EST. PATIENT-LVL IV: CPT | Mod: PBBFAC,,, | Performed by: UROLOGY

## 2023-09-21 PROCEDURE — 3078F PR MOST RECENT DIASTOLIC BLOOD PRESSURE < 80 MM HG: ICD-10-PCS | Mod: CPTII,S$GLB,, | Performed by: UROLOGY

## 2023-09-21 PROCEDURE — 1101F PT FALLS ASSESS-DOCD LE1/YR: CPT | Mod: CPTII,S$GLB,, | Performed by: UROLOGY

## 2023-09-21 PROCEDURE — 1101F PR PT FALLS ASSESS DOC 0-1 FALLS W/OUT INJ PAST YR: ICD-10-PCS | Mod: CPTII,S$GLB,, | Performed by: UROLOGY

## 2023-09-21 PROCEDURE — 99205 OFFICE O/P NEW HI 60 MIN: CPT | Mod: S$GLB,,, | Performed by: UROLOGY

## 2023-09-21 NOTE — LETTER
September 21, 2023        Erasto Bacon, DNP  [address] [facility] [address]             [facility] - Urology [address] [place] [id number]  Phone: [phone]   Patient: Franko Smith   MR Number: [id number]   YOB: 1945   Date of Visit: 9/21/2023       Dear Dr. Bacon:    Thank you for referring Franko Smith to me for evaluation. Attached you will find relevant portions of my assessment and plan of care.    If you have questions, please do not hesitate to call me. I look forward to following Franko Smith along with you.    Sincerely,      Roderick Florez MD            CC  No Recipients    Enclosure

## 2023-09-21 NOTE — LETTER
September 21, 2023        Frank Khalil MD  [address] [facility] [address]             [facility] - Urology [address] [place] [id number]  Phone: [phone]   Patient: Franko Smith   MR Number: [id number]   YOB: 1945   Date of Visit: 9/21/2023       Dear Dr. Khalil:    Thank you for referring Franko Smith to me for evaluation. Attached you will find relevant portions of my assessment and plan of care.    If you have questions, please do not hesitate to call me. I look forward to following Franko Smith along with you.    Sincerely,      Roderick Florez MD            CC  No Recipients    Enclosure

## 2023-09-25 ENCOUNTER — TELEPHONE (OUTPATIENT)
Dept: RADIATION ONCOLOGY | Facility: CLINIC | Age: 78
End: 2023-09-25
Payer: OTHER GOVERNMENT

## 2023-09-25 ENCOUNTER — TELEPHONE (OUTPATIENT)
Dept: HEMATOLOGY/ONCOLOGY | Facility: CLINIC | Age: 78
End: 2023-09-25
Payer: OTHER GOVERNMENT

## 2023-09-25 NOTE — TELEPHONE ENCOUNTER
Called to schedule consultation, no answer. Left voicemail requesting a call back. Contact information provided.

## 2023-09-25 NOTE — TELEPHONE ENCOUNTER
Outgoing call to patient's wife returning voicemail. Requesting appointment from referral to radiation oncology. Message sent to radiation staff. Kia Luis made aware and thanked nurse for assistance.

## 2023-09-26 ENCOUNTER — OFFICE VISIT (OUTPATIENT)
Dept: RADIATION ONCOLOGY | Facility: CLINIC | Age: 78
End: 2023-09-26
Payer: MEDICARE

## 2023-09-26 VITALS
WEIGHT: 239.63 LBS | HEART RATE: 80 BPM | OXYGEN SATURATION: 95 % | DIASTOLIC BLOOD PRESSURE: 77 MMHG | BODY MASS INDEX: 32.5 KG/M2 | RESPIRATION RATE: 19 BRPM | SYSTOLIC BLOOD PRESSURE: 138 MMHG

## 2023-09-26 DIAGNOSIS — C61 PROSTATE CANCER: ICD-10-CM

## 2023-09-26 PROCEDURE — 3078F PR MOST RECENT DIASTOLIC BLOOD PRESSURE < 80 MM HG: ICD-10-PCS | Mod: CPTII,S$GLB,, | Performed by: RADIOLOGY

## 2023-09-26 PROCEDURE — 1125F AMNT PAIN NOTED PAIN PRSNT: CPT | Mod: CPTII,S$GLB,, | Performed by: RADIOLOGY

## 2023-09-26 PROCEDURE — 1160F RVW MEDS BY RX/DR IN RCRD: CPT | Mod: CPTII,S$GLB,, | Performed by: RADIOLOGY

## 2023-09-26 PROCEDURE — 1160F PR REVIEW ALL MEDS BY PRESCRIBER/CLIN PHARMACIST DOCUMENTED: ICD-10-PCS | Mod: CPTII,S$GLB,, | Performed by: RADIOLOGY

## 2023-09-26 PROCEDURE — 1159F MED LIST DOCD IN RCRD: CPT | Mod: CPTII,S$GLB,, | Performed by: RADIOLOGY

## 2023-09-26 PROCEDURE — 3288F PR FALLS RISK ASSESSMENT DOCUMENTED: ICD-10-PCS | Mod: CPTII,S$GLB,, | Performed by: RADIOLOGY

## 2023-09-26 PROCEDURE — 3075F SYST BP GE 130 - 139MM HG: CPT | Mod: CPTII,S$GLB,, | Performed by: RADIOLOGY

## 2023-09-26 PROCEDURE — 99999 PR PBB SHADOW E&M-EST. PATIENT-LVL IV: ICD-10-PCS | Mod: PBBFAC,,, | Performed by: RADIOLOGY

## 2023-09-26 PROCEDURE — 99999 PR PBB SHADOW E&M-EST. PATIENT-LVL IV: CPT | Mod: PBBFAC,,, | Performed by: RADIOLOGY

## 2023-09-26 PROCEDURE — 3075F PR MOST RECENT SYSTOLIC BLOOD PRESS GE 130-139MM HG: ICD-10-PCS | Mod: CPTII,S$GLB,, | Performed by: RADIOLOGY

## 2023-09-26 PROCEDURE — 1101F PT FALLS ASSESS-DOCD LE1/YR: CPT | Mod: CPTII,S$GLB,, | Performed by: RADIOLOGY

## 2023-09-26 PROCEDURE — 3078F DIAST BP <80 MM HG: CPT | Mod: CPTII,S$GLB,, | Performed by: RADIOLOGY

## 2023-09-26 PROCEDURE — 1159F PR MEDICATION LIST DOCUMENTED IN MEDICAL RECORD: ICD-10-PCS | Mod: CPTII,S$GLB,, | Performed by: RADIOLOGY

## 2023-09-26 PROCEDURE — 1125F PR PAIN SEVERITY QUANTIFIED, PAIN PRESENT: ICD-10-PCS | Mod: CPTII,S$GLB,, | Performed by: RADIOLOGY

## 2023-09-26 PROCEDURE — 1101F PR PT FALLS ASSESS DOC 0-1 FALLS W/OUT INJ PAST YR: ICD-10-PCS | Mod: CPTII,S$GLB,, | Performed by: RADIOLOGY

## 2023-09-26 PROCEDURE — 99204 PR OFFICE/OUTPT VISIT, NEW, LEVL IV, 45-59 MIN: ICD-10-PCS | Mod: S$GLB,,, | Performed by: RADIOLOGY

## 2023-09-26 PROCEDURE — 3288F FALL RISK ASSESSMENT DOCD: CPT | Mod: CPTII,S$GLB,, | Performed by: RADIOLOGY

## 2023-09-26 PROCEDURE — 99204 OFFICE O/P NEW MOD 45 MIN: CPT | Mod: S$GLB,,, | Performed by: RADIOLOGY

## 2023-09-26 RX ORDER — BICALUTAMIDE 50 MG/1
50 TABLET, FILM COATED ORAL DAILY
Qty: 30 TABLET | Refills: 2 | Status: SHIPPED | OUTPATIENT
Start: 2023-09-26 | End: 2024-03-25

## 2023-09-26 NOTE — PROGRESS NOTES
[facility] - Radiation Oncology    HISTORY OF PRESENT ILLNESS:   This patient presents for discussion of treatment options for a recently diagnosed prostate cancer.      [facility] velocity.  PSA in February of 2023 was 3.38 ng/ml.  MRI in May of 2023 revealed a 54 cc prostate with a 1.4 cm PI-RADS 4 lesion in the Rt. mid lateral peripheral zone without extraprostatic extension.  The seminal vesicles and neurovascular bundles were unremarkable.  There was no adenopathy.  Biopsies on 7/25/23 revealed [place] 8 (3+5) adenocarcinoma involving 25% of the core from the Rt. medial base.  Thre was [place] 7 (4+3) disease at the #4 BOB.  Mode 7 (3+4) adenocarcinoma involved 10 - 85% of the cores from the Rt. lateral base, Rt. lateral mid gland, Rt. medial mid gland, and #3 BOB.  There was [place] 6 (3+3) adenocarcinoma involving cores from the Rt. lateral apex. Overall tumor involved 7/16 sites and 14/24 cores.  PSMA scan revealed focal uptake in the prostate with no evidence of regional or distant metastatic disease.  The patient presents with his wife to discuss radiotherapy options.     REVIEW OF SYSTEMS:   Review of Systems   Constitutional:  Negative for chills, fever, malaise/fatigue and weight loss.   Respiratory:  Negative for cough and shortness of breath.    Cardiovascular:  Negative for chest pain and palpitations.   Gastrointestinal:  Negative for abdominal pain and diarrhea.   Genitourinary:  Positive for frequency and urgency. Negative for dysuria and hematuria.        AUA score 2, 5, 5, 4, 5, 1, 3,  Mixed  Notes ED         PAST MEDICAL HISTORY:  Past Medical History:   Diagnosis Date    Anxiety     Diabetes mellitus, type 2     GERD (gastroesophageal reflux disease)     Hyperlipidemia     Hypertension     Obesity (BMI 30-39.9)        PAST SURGICAL HISTORY:  Past Surgical History:   Procedure Laterality Date     HERNIA REPAIR      ULNAR NERVE REPAIR Left        ALLERGIES:   Review of patient's allergies indicates:   Allergen Reactions    Ether for anesthesia Nausea And Vomiting       MEDICATIONS:  Current Outpatient Medications   Medication Sig    clonazePAM (KLONOPIN) 0.5 MG tablet Take 0.5 mg by mouth 2 (two) times daily as needed for Anxiety.    metformin (GLUCOPHAGE) 500 MG tablet Take 500 mg by mouth 2 (two) times daily with meals.    pravastatin (PRAVACHOL) 40 MG tablet Take 40 mg by mouth once daily.    tadalafil (CIALIS) 20 MG Tab Use one tablet every 36 hours    acetaminophen (TYLENOL) 500 MG tablet Take 500 mg by mouth every 6 (six) hours as needed for Pain.    aspirin 81 MG Chew Take 81 mg by mouth once daily.    bicalutamide (CASODEX) 50 MG Tab Take 1 tablet (50 mg total) by mouth once daily.    cefUROXime (CEFTIN) 500 MG tablet Take 500 mg by mouth 2 (two) times daily.    ciprofloxacin (CIPRO) 500 mg/5 mL suspension Take by mouth 2 (two) times daily.    diphenhydrAMINE-aluminum-magnesium hydroxide-simethicone-LIDOcaine HCl 2% Swish and spit 15 mLs every 4 (four) hours as needed (throat pain). (Patient not taking: Reported on 9/21/2023)    fluticasone propionate (FLONASE) 50 mcg/actuation nasal spray 2 sprays (100 mcg total) by Each Nostril route daily as needed. (Patient not taking: Reported on 9/21/2023)    losartan (COZAAR) 50 MG tablet Take 50 mg by mouth once daily.    methocarbamol (ROBAXIN) 750 MG Tab Take 500 mg by mouth 4 (four) times daily.    methylPREDNISolone (MEDROL DOSEPACK) 4 mg tablet use as directed (Patient not taking: Reported on 7/26/2022)    naproxen (NAPROSYN) 375 MG tablet Take 375 mg by mouth 2 (two) times daily with meals.    omeprazole (PRILOSEC) 20 MG capsule Take 20 mg by mouth 2 (two) times daily.     promethazine (PHENERGAN) 12.5 MG Tab Take 1 tablet (12.5 mg total) by mouth every 6 (six) hours as needed (nausea or vomiting). (Patient not taking: Reported on 9/21/2023)    verapamil  (CALAN) 80 MG tablet Take 80 mg by mouth once daily.      Current Facility-Administered Medications   Medication    [START ON 10/11/2023] leuprolide acetate (6 month) injection 45 mg       SOCIAL HISTORY:  Social History     Socioeconomic History    Marital status:     Number of children: 5   Occupational History    Occupation: Retired     Tobacco Use    Smoking status: Never    Smokeless tobacco: Never   Substance and Sexual Activity    Alcohol use: Yes     Alcohol/week: 0.0 standard drinks of alcohol     Comment: social    Drug use: No    Sexual activity: Yes     Partners: Female       FAMILY HISTORY:  Family History   Problem Relation Age of Onset    Hypertension Mother     Hyperlipidemia Mother     Cancer Father     Diabetes Sister     Diabetes Brother     Stroke Neg Hx     Heart disease Neg Hx          PHYSICAL EXAMINATION:  Vitals:    23 1004   BP: 138/77   Pulse: 80   Resp: 19     Physical Exam  Constitutional:       General: He is not in acute distress.     Appearance: Normal appearance.   Pulmonary:      Effort: Pulmonary effort is normal. No respiratory distress.   Neurological:      Mental Status: He is alert and oriented to person, place, and time.   Psychiatric:         Mood and Affect: Mood normal.         Judgment: Judgment normal.       ASSESSMENT/PLAN:  Clinical Stage IIC (T1c, N0, M0, GG4, PSA < 10) prostate cancer.     ECO    I had a long discussion with the patient and his wife.  Explained he is considered to have high risk prostate cancer based on his Mode score.  Discussed the implications of this classification and explained we would recommend he consider some form of definitive treatment.  Discussed the options of external beam therapy delivering 70 Gy in 28 fractions to the prostate and pelvic nodes combined with 6 - 12 months of hormonal deprivation therapy.  Discussed the rational for consideration of combined modality therapy.  Reviewed the procedures, risks and  benefits of therapy.  Discussed the acute and long term side effects of radiotherapy and hormonal deprivation therapy.  The patient was agreeable to proceed with therapy.  Will plan to begin bicalutamide today with Lupron planned for 10/11/23.  Will began radiotherapy 6 - 8 weeks following Lupron injection.  Will plan Space OAR and markers prior to simulation. Thank you for allowing us to participate in the care of this patient.     Psychosocial Distress screening score of Distress Score: 7 noted and reviewed. The patient has existing mental health services.     I spent approximately 45 minutes reviewing the available records and evaluating the patient, out of which over 50% of the time was spent face to face with the patient in counseling and coordinating this patient's care.

## 2023-10-16 ENCOUNTER — TELEPHONE (OUTPATIENT)
Dept: URGENT CARE | Facility: CLINIC | Age: 78
End: 2023-10-16

## 2023-10-16 ENCOUNTER — OFFICE VISIT (OUTPATIENT)
Dept: URGENT CARE | Facility: CLINIC | Age: 78
End: 2023-10-16
Payer: OTHER GOVERNMENT

## 2023-10-16 VITALS
WEIGHT: 227 LBS | HEART RATE: 98 BPM | HEIGHT: 72 IN | DIASTOLIC BLOOD PRESSURE: 76 MMHG | RESPIRATION RATE: 19 BRPM | OXYGEN SATURATION: 98 % | BODY MASS INDEX: 30.75 KG/M2 | TEMPERATURE: 98 F | SYSTOLIC BLOOD PRESSURE: 125 MMHG

## 2023-10-16 DIAGNOSIS — J10.1 INFLUENZA A: ICD-10-CM

## 2023-10-16 DIAGNOSIS — J06.9 VIRAL URI WITH COUGH: ICD-10-CM

## 2023-10-16 DIAGNOSIS — R05.1 ACUTE COUGH: ICD-10-CM

## 2023-10-16 DIAGNOSIS — R05.1 ACUTE COUGH: Primary | ICD-10-CM

## 2023-10-16 LAB
CTP QC/QA: YES
CTP QC/QA: YES
POC MOLECULAR INFLUENZA A AGN: POSITIVE
POC MOLECULAR INFLUENZA B AGN: NEGATIVE
SARS-COV-2 AG RESP QL IA.RAPID: NEGATIVE

## 2023-10-16 PROCEDURE — 99214 OFFICE O/P EST MOD 30 MIN: CPT | Mod: S$GLB,,, | Performed by: FAMILY MEDICINE

## 2023-10-16 PROCEDURE — 99214 PR OFFICE/OUTPT VISIT, EST, LEVL IV, 30-39 MIN: ICD-10-PCS | Mod: S$GLB,,, | Performed by: FAMILY MEDICINE

## 2023-10-16 PROCEDURE — 87502 INFLUENZA DNA AMP PROBE: CPT | Mod: QW,S$GLB,, | Performed by: FAMILY MEDICINE

## 2023-10-16 PROCEDURE — 87811 SARS-COV-2 COVID19 W/OPTIC: CPT | Mod: QW,S$GLB,, | Performed by: FAMILY MEDICINE

## 2023-10-16 PROCEDURE — 87502 POCT INFLUENZA A/B MOLECULAR: ICD-10-PCS | Mod: QW,S$GLB,, | Performed by: FAMILY MEDICINE

## 2023-10-16 PROCEDURE — 87811 SARS CORONAVIRUS 2 ANTIGEN POCT, MANUAL READ: ICD-10-PCS | Mod: QW,S$GLB,, | Performed by: FAMILY MEDICINE

## 2023-10-16 RX ORDER — PROMETHAZINE HYDROCHLORIDE AND DEXTROMETHORPHAN HYDROBROMIDE 6.25; 15 MG/5ML; MG/5ML
5 SYRUP ORAL EVERY 4 HOURS PRN
Qty: 240 ML | Refills: 0 | Status: SHIPPED | OUTPATIENT
Start: 2023-10-16 | End: 2023-10-16 | Stop reason: SDUPTHER

## 2023-10-16 RX ORDER — OSELTAMIVIR PHOSPHATE 75 MG/1
75 CAPSULE ORAL 2 TIMES DAILY
Qty: 10 CAPSULE | Refills: 0 | Status: SHIPPED | OUTPATIENT
Start: 2023-10-16 | End: 2023-10-16 | Stop reason: SDUPTHER

## 2023-10-16 RX ORDER — NAPROXEN 500 MG/1
500 TABLET ORAL 2 TIMES DAILY WITH MEALS
Qty: 14 TABLET | Refills: 0 | Status: SHIPPED | OUTPATIENT
Start: 2023-10-16 | End: 2023-10-16 | Stop reason: SDUPTHER

## 2023-10-16 RX ORDER — OSELTAMIVIR PHOSPHATE 75 MG/1
75 CAPSULE ORAL 2 TIMES DAILY
Qty: 10 CAPSULE | Refills: 0 | Status: SHIPPED | OUTPATIENT
Start: 2023-10-16 | End: 2023-10-22

## 2023-10-16 RX ORDER — FLUTICASONE PROPIONATE 50 MCG
2 SPRAY, SUSPENSION (ML) NASAL 2 TIMES DAILY PRN
Qty: 9.9 ML | Refills: 0 | Status: SHIPPED | OUTPATIENT
Start: 2023-10-16 | End: 2023-10-16 | Stop reason: SDUPTHER

## 2023-10-16 RX ORDER — NAPROXEN 500 MG/1
500 TABLET ORAL 2 TIMES DAILY WITH MEALS
Qty: 14 TABLET | Refills: 0 | Status: SHIPPED | OUTPATIENT
Start: 2023-10-16

## 2023-10-16 RX ORDER — FLUTICASONE PROPIONATE 50 MCG
2 SPRAY, SUSPENSION (ML) NASAL 2 TIMES DAILY PRN
Qty: 9.9 ML | Refills: 0 | Status: SHIPPED | OUTPATIENT
Start: 2023-10-16

## 2023-10-16 RX ORDER — PROMETHAZINE HYDROCHLORIDE AND DEXTROMETHORPHAN HYDROBROMIDE 6.25; 15 MG/5ML; MG/5ML
5 SYRUP ORAL EVERY 4 HOURS PRN
Qty: 240 ML | Refills: 0 | Status: SHIPPED | OUTPATIENT
Start: 2023-10-16 | End: 2023-10-26

## 2023-10-16 NOTE — PROGRESS NOTES
Subjective:      Patient ID: Franko Smith is a 78 y.o. male.    Vitals:  height is 6' (1.829 m) and weight is 103 kg (227 lb). His oral temperature is 98.3 °F (36.8 °C). His blood pressure is 125/76 and his pulse is 98. His respiration is 19 and oxygen saturation is 98%.     Chief Complaint: Cough    78 y.o. patient complains of coughing with sputum, fever 99.5 this morning, sore throat, slight headaches, chills that began 2 days ago. Pt states that have taken OTC Mucinex to help with symptoms.     Cough  This is a new problem. The current episode started in the past 7 days (2 days ago). The problem has been gradually worsening. The problem occurs constantly. The cough is Productive of sputum. Associated symptoms include chills, a fever, headaches, nasal congestion and a sore throat. Pertinent negatives include no chest pain, ear congestion, ear pain, heartburn, hemoptysis, myalgias, postnasal drip, rash, rhinorrhea, shortness of breath, sweats, weight loss or wheezing. The symptoms are aggravated by lying down. He has tried OTC cough suppressant (Mucinex) for the symptoms. The treatment provided no relief. There is no history of asthma, bronchiectasis, bronchitis, COPD, emphysema, environmental allergies or pneumonia.       Constitution: Positive for chills and fever.   HENT:  Positive for sore throat. Negative for ear pain and postnasal drip.    Cardiovascular:  Negative for chest pain.   Respiratory:  Positive for cough. Negative for bloody sputum, shortness of breath and wheezing.    Gastrointestinal:  Negative for heartburn.   Musculoskeletal:  Negative for muscle ache.   Skin:  Negative for rash.   Allergic/Immunologic: Negative for environmental allergies.   Neurological:  Positive for headaches.      Objective:     Vitals:    10/16/23 1105   BP: 125/76   BP Location: Left arm   Patient Position: Sitting   BP Method: Medium (Automatic)   Pulse: 98   Resp: 19   Temp: 98.3 °F (36.8 °C)   TempSrc: Oral    SpO2: 98%   Weight: 103 kg (227 lb)   Height: 6' (1.829 m)      Physical Exam   Constitutional: He is oriented to person, place, and time.  Non-toxic appearance. He does not appear ill. No distress.   HENT:   Head: Atraumatic.   Ears:   Right Ear: Tympanic membrane normal.   Left Ear: Tympanic membrane normal.   Nose: Rhinorrhea and congestion present.   Eyes: Conjunctivae are normal.   Neck: Neck supple.   Cardiovascular: Normal rate, regular rhythm, normal heart sounds and normal pulses.   Pulmonary/Chest: Effort normal and breath sounds normal.   Neurological: He is alert and oriented to person, place, and time.   Skin: Skin is not diaphoretic.   Psychiatric: Judgment and thought content normal.     Results for orders placed or performed in visit on 10/16/23   SARS Coronavirus 2 Antigen, POCT Manual Read   Result Value Ref Range    SARS Coronavirus 2 Antigen Negative Negative     Acceptable Yes    POCT Influenza A/B MOLECULAR   Result Value Ref Range    POC Molecular Influenza A Ag Positive (A) Negative, Not Reported    POC Molecular Influenza B Ag Negative Negative, Not Reported     Acceptable Yes       Assessment:     1. Acute cough    2. Influenza A    3. Viral URI with cough        Plan:       Acute cough  -     SARS Coronavirus 2 Antigen, POCT Manual Read  -     POCT Influenza A/B MOLECULAR  -     promethazine-dextromethorphan (PROMETHAZINE-DM) 6.25-15 mg/5 mL Syrp; Take 5 mLs by mouth every 4 (four) hours as needed (cough). This medication can make you drowsy.  Dispense: 240 mL; Refill: 0    2. Influenza A  -     oseltamivir (TAMIFLU) 75 MG capsule; Take 1 capsule (75 mg total) by mouth 2 (two) times daily. for 5 days  Dispense: 10 capsule; Refill: 0    3. Viral URI with cough  -     promethazine-dextromethorphan (PROMETHAZINE-DM) 6.25-15 mg/5 mL Syrp; Take 5 mLs by mouth every 4 (four) hours as needed (cough). This medication can make you drowsy.  Dispense: 240 mL; Refill:  0  -     fluticasone propionate (FLONASE) 50 mcg/actuation nasal spray; 2 sprays (100 mcg total) by Each Nostril route 2 (two) times daily as needed for Rhinitis.  Dispense: 9.9 mL; Refill: 0  -     naproxen (NAPROSYN) 500 MG tablet; Take 1 tablet (500 mg total) by mouth 2 (two) times daily with meals.  Dispense: 14 tablet; Refill: 0    I have reviewed labs from 2016 to present.    Patient Instructions   Seek immediate care in the emergency room in the event of severe abdominal pain, chest pain, respiratory distress, fever unresponsive to antipyretic, dehydration, loss of consciousness, seizure.      Flu Discharge Instructions, Adult   About this topic   Influenza, or flu, is an infection caused by the influenza virus. It affects your throat, breathing tube, and lungs (the respiratory system). It spreads from a person who is sick to some other person from close contact. Flu may cause:  Fever over 100.4°F (38°C)  Chills  Body aches  Headache  Cough  Runny or stuffy nose  Sore throat  Tiredness  Throwing up  What care is needed at home?   Ask your doctor what you need to do when you go home. Make sure you ask questions if you do not understand what the doctor says. This way you will know what you need to do.  Drink lots of fluids, such as water, broth, sports drinks, and tea. This will keep your fluid levels up.  You need to rest while you are getting better.  Get enough sleep.  Use a machine that makes steam like a vaporizer or humidifier. It may help open up a clogged nose so you can breathe easier.  What follow-up care is needed?   Your doctor may ask you to make visits to the office to check on your progress. Be sure to keep these visits.  What drugs may be needed?   The doctor may order drugs to:  Treat the flu  Lower fever  Help with pain  Relieve body aches  Control coughing  Will physical activity be limited?   You need to rest while you are getting better. This means you may need to limit your activity until  you feel well.  What changes to diet are needed?   Eat food that will not upset your stomach such as:  Chicken soup  Bananas  Rice  Apples  Toast  What problems could happen?   Pneumonia  Too much fluid loss. This is called dehydration.  Infection  Worsening of heart and lung problems  What can be done to prevent this health problem?   Keep your hands away from your nose, eyes, and mouth. The virus most often enters the body through these parts.  Wash your hands often with soap and water for at least 20 seconds, especially after you cough or sneeze. Use alcohol-based hand sanitizers if soap and water are not available.       Do not get close to, hug, or kiss people who are sick.  Avoid sharing your towels, tissues, food, or drink with anyone who is sick.  Avoid going to crowded places.  Get a flu shot each year.  To keep from spreading germs in the house or other places:  If you are sick, stay at home. Stay in a separate room if possible. You may spread the flu from the day before you have signs and up to 7 days after you get sick. You may return to work after the fever is gone for 24 hours without the use of drugs to lower your fever.  Cover your mouth and nose with tissue when you cough or sneeze. You can also cough into your elbow. Throw away tissues in the trash and wash your hands after touching used tissues.  Keep your house clean by wiping down counters, sinks, faucets, doorknobs, telephones, remotes, and light switches with a  with bleach. Wash dishes in the  or with hot soapy water. The flu virus can live on solid surfaces for 24 hours.     When do I need to call the doctor?   Fever or cough returns or gets worse  Chest pain with deep breathing  Confusion or sudden dizziness  Very bad throwing up or throwing up that does not stop  Trouble breathing  Passing less urine        You are not feeling better in 2 to 3 days or you are feeling worse  Teach Back: Helping You Understand   The Teach  Back Method helps you understand the information we are giving you. The idea is simple. After talking with the staff, tell them in your own words what you were just told. This helps to make sure the staff has covered each thing clearly. It also helps to explain things that may have been a bit confusing. Before going home, make sure you are able to do these:  I can tell you about my condition.  I can tell you what I can do to help keep my fluid levels up.  I can tell you what I will do to keep others from getting sick.  I can tell you what I will do if I have chest pain with deep breathing, trouble breathing, passing less urine, or very bad throwing up.  Where can I learn more?   [place] [employer]  https://www.lung.[place]/lung-health/lung-disease/influenza   Centers for Disease Control and Prevention  https://www.cdc.gov/flu/highrisk/65over.htm   Centers for Disease Control and Prevention  http://www.cdc.gov/flu/   Last Reviewed Date   2020-02-28  Consumer Information Use and Disclaimer   This information is not specific medical advice and does not replace information you receive from your health care provider. This is only a brief summary of general information. It does NOT include all information about conditions, illnesses, injuries, tests, procedures, treatments, therapies, discharge instructions or life-style choices that may apply to you. You must talk with your health care provider for complete information about your health and treatment options. This information should not be used to decide whether or not to accept your health care providers advice, instructions or recommendations. Only your health care provider has the knowledge and training to provide advice that is right for you.  Copyright   Copyright © 2021 [employer] and its affiliates and/or licensors. All rights reserved.

## 2023-10-16 NOTE — TELEPHONE ENCOUNTER
----- Message from Carrington Schmidt MA sent at 10/16/2023  4:15 PM CDT -----  Regarding: missed Rx  Pt wife called and stated that the Rx's for tamiflu and cough syrup was not sent to the pharmacy, so now they are asking if it can be sent to the [facility] next door please?

## 2023-12-01 ENCOUNTER — APPOINTMENT (OUTPATIENT)
Dept: RADIATION THERAPY | Facility: OTHER | Age: 78
End: 2023-12-01
Attending: RADIOLOGY
Payer: OTHER GOVERNMENT

## 2023-12-04 ENCOUNTER — HOSPITAL ENCOUNTER (OUTPATIENT)
Dept: RADIATION THERAPY | Facility: HOSPITAL | Age: 78
Discharge: HOME | End: 2023-12-04
Payer: OTHER GOVERNMENT

## 2023-12-04 PROCEDURE — 77014 HC CT GUIDANCE RADIATION THERAPY FLDS PLACEMENT: CPT | Mod: TC | Performed by: RADIOLOGY

## 2023-12-04 PROCEDURE — 77263 THER RADIOLOGY TX PLNG CPLX: CPT | Mod: ,,, | Performed by: RADIOLOGY

## 2023-12-04 PROCEDURE — 77263 PR  RADIATION THERAPY PLAN COMPLEX: ICD-10-PCS | Mod: ,,, | Performed by: RADIOLOGY

## 2023-12-04 PROCEDURE — 77334 RADIATION TREATMENT AID(S): CPT | Mod: TC | Performed by: RADIOLOGY

## 2023-12-04 PROCEDURE — 77334 RADIATION TREATMENT AID(S): CPT | Mod: 26,,, | Performed by: RADIOLOGY

## 2023-12-04 PROCEDURE — 77014 PR  CT GUIDANCE PLACEMENT RAD THERAPY FIELDS: ICD-10-PCS | Mod: 26,,, | Performed by: RADIOLOGY

## 2023-12-04 PROCEDURE — 77014 PR  CT GUIDANCE PLACEMENT RAD THERAPY FIELDS: CPT | Mod: 26,,, | Performed by: RADIOLOGY

## 2023-12-04 PROCEDURE — 77334 PR  RADN TREATMENT AID(S) COMPLX: ICD-10-PCS | Mod: 26,,, | Performed by: RADIOLOGY

## 2023-12-08 PROCEDURE — 77301 PR  INTEN MOD RADIOTHER PLAN W/DOSE VOL HIST: ICD-10-PCS | Mod: 26,,, | Performed by: RADIOLOGY

## 2023-12-08 PROCEDURE — 77301 RADIOTHERAPY DOSE PLAN IMRT: CPT | Mod: 26,,, | Performed by: RADIOLOGY

## 2023-12-08 PROCEDURE — 77301 RADIOTHERAPY DOSE PLAN IMRT: CPT | Mod: TC | Performed by: RADIOLOGY

## 2023-12-09 PROCEDURE — 77338 DESIGN MLC DEVICE FOR IMRT: CPT | Mod: TC | Performed by: RADIOLOGY

## 2023-12-09 PROCEDURE — 77300 RADIATION THERAPY DOSE PLAN: CPT | Mod: 26,,, | Performed by: RADIOLOGY

## 2023-12-09 PROCEDURE — 77300 PR RADIATION THERAPY,DOSIMETRY PLAN: ICD-10-PCS | Mod: 26,,, | Performed by: RADIOLOGY

## 2023-12-09 PROCEDURE — 77300 RADIATION THERAPY DOSE PLAN: CPT | Mod: TC | Performed by: RADIOLOGY

## 2023-12-09 PROCEDURE — 77338 DESIGN MLC DEVICE FOR IMRT: CPT | Mod: 26,,, | Performed by: RADIOLOGY

## 2023-12-09 PROCEDURE — 77338 PR  MLC IMRT DESIGN & CONSTRUCTION PER IMRT PLAN: ICD-10-PCS | Mod: 26,,, | Performed by: RADIOLOGY

## 2023-12-14 PROCEDURE — 77427 RADIATION TX MANAGEMENT X5: CPT | Mod: ,,, | Performed by: RADIOLOGY

## 2023-12-14 PROCEDURE — 77014 PR  CT GUIDANCE PLACEMENT RAD THERAPY FIELDS: CPT | Mod: 26,,, | Performed by: RADIOLOGY

## 2023-12-14 PROCEDURE — 77014 PR  CT GUIDANCE PLACEMENT RAD THERAPY FIELDS: ICD-10-PCS | Mod: 26,,, | Performed by: RADIOLOGY

## 2023-12-14 PROCEDURE — 77385 HC IMRT, SIMPLE: CPT | Performed by: RADIOLOGY

## 2023-12-15 PROCEDURE — 77385 HC IMRT, SIMPLE: CPT | Performed by: RADIOLOGY

## 2023-12-15 PROCEDURE — 77014 PR  CT GUIDANCE PLACEMENT RAD THERAPY FIELDS: CPT | Mod: 26,,, | Performed by: RADIOLOGY

## 2023-12-15 PROCEDURE — 77014 PR  CT GUIDANCE PLACEMENT RAD THERAPY FIELDS: ICD-10-PCS | Mod: 26,,, | Performed by: RADIOLOGY

## 2023-12-18 ENCOUNTER — DOCUMENTATION ONLY (OUTPATIENT)
Dept: RADIATION ONCOLOGY | Facility: CLINIC | Age: 78
End: 2023-12-18
Payer: OTHER GOVERNMENT

## 2023-12-18 PROCEDURE — 77385 HC IMRT, SIMPLE: CPT | Performed by: RADIOLOGY

## 2023-12-18 PROCEDURE — 77014 PR  CT GUIDANCE PLACEMENT RAD THERAPY FIELDS: CPT | Mod: 26,,, | Performed by: RADIOLOGY

## 2023-12-18 NOTE — PLAN OF CARE
Day 3 of outpatient radiation to the prostate. Doing well. No  or GI issues.     Encouraged to make a bowel movement before coming to therapy and to come in with a full bladder.  Pt voiced understanding.

## 2023-12-19 PROCEDURE — 77385 HC IMRT, SIMPLE: CPT | Performed by: RADIOLOGY

## 2023-12-19 PROCEDURE — 77014 PR  CT GUIDANCE PLACEMENT RAD THERAPY FIELDS: CPT | Mod: 26,,, | Performed by: RADIOLOGY

## 2023-12-20 PROCEDURE — 77014 PR  CT GUIDANCE PLACEMENT RAD THERAPY FIELDS: CPT | Mod: 26,,, | Performed by: RADIOLOGY

## 2023-12-20 PROCEDURE — 77385 HC IMRT, SIMPLE: CPT | Performed by: RADIOLOGY

## 2023-12-20 PROCEDURE — 77336 RADIATION PHYSICS CONSULT: CPT | Performed by: RADIOLOGY

## 2023-12-21 DIAGNOSIS — C61 PROSTATE CANCER: Primary | ICD-10-CM

## 2023-12-21 PROCEDURE — 77385 HC IMRT, SIMPLE: CPT | Performed by: RADIOLOGY

## 2023-12-21 PROCEDURE — 77014 PR  CT GUIDANCE PLACEMENT RAD THERAPY FIELDS: CPT | Mod: 26,,, | Performed by: RADIOLOGY

## 2023-12-21 PROCEDURE — 77427 RADIATION TX MANAGEMENT X5: CPT | Mod: ,,, | Performed by: RADIOLOGY

## 2023-12-21 RX ORDER — LORAZEPAM 1 MG/1
1 TABLET ORAL EVERY 6 HOURS PRN
Qty: 30 TABLET | Refills: 0 | Status: SHIPPED | OUTPATIENT
Start: 2023-12-21 | End: 2024-01-20

## 2023-12-22 PROCEDURE — 77014 PR  CT GUIDANCE PLACEMENT RAD THERAPY FIELDS: CPT | Mod: 26,,, | Performed by: RADIOLOGY

## 2023-12-22 PROCEDURE — 77385 HC IMRT, SIMPLE: CPT | Performed by: RADIOLOGY

## 2023-12-26 PROCEDURE — 77385 HC IMRT, SIMPLE: CPT | Performed by: RADIOLOGY

## 2023-12-26 PROCEDURE — 77014 PR  CT GUIDANCE PLACEMENT RAD THERAPY FIELDS: CPT | Mod: 26,,, | Performed by: RADIOLOGY

## 2023-12-27 PROCEDURE — 77014 PR  CT GUIDANCE PLACEMENT RAD THERAPY FIELDS: CPT | Mod: 26,,, | Performed by: RADIOLOGY

## 2023-12-27 PROCEDURE — 77385 HC IMRT, SIMPLE: CPT | Performed by: RADIOLOGY

## 2023-12-28 ENCOUNTER — DOCUMENTATION ONLY (OUTPATIENT)
Dept: RADIATION ONCOLOGY | Facility: CLINIC | Age: 78
End: 2023-12-28
Payer: OTHER GOVERNMENT

## 2023-12-28 PROCEDURE — 77014 PR  CT GUIDANCE PLACEMENT RAD THERAPY FIELDS: CPT | Mod: 26,,, | Performed by: RADIOLOGY

## 2023-12-28 PROCEDURE — 77336 RADIATION PHYSICS CONSULT: CPT | Performed by: RADIOLOGY

## 2023-12-28 PROCEDURE — 77385 HC IMRT, SIMPLE: CPT | Performed by: RADIOLOGY

## 2023-12-28 NOTE — PLAN OF CARE
Day 10 of outpatient radiation to the prostate. Doing well. No dysuria or hematuria. Increased urgency, nocturia x 3. No diarrhea.

## 2023-12-29 PROCEDURE — 77014 PR  CT GUIDANCE PLACEMENT RAD THERAPY FIELDS: CPT | Mod: 26,,, | Performed by: RADIOLOGY

## 2023-12-29 PROCEDURE — 77427 RADIATION TX MANAGEMENT X5: CPT | Mod: ,,, | Performed by: RADIOLOGY

## 2023-12-29 PROCEDURE — 77385 HC IMRT, SIMPLE: CPT | Performed by: RADIOLOGY

## 2024-01-02 ENCOUNTER — LAB VISIT (OUTPATIENT)
Dept: LAB | Facility: OTHER | Age: 79
End: 2024-01-02
Attending: RADIOLOGY
Payer: OTHER GOVERNMENT

## 2024-01-02 ENCOUNTER — APPOINTMENT (OUTPATIENT)
Dept: RADIATION THERAPY | Facility: OTHER | Age: 79
End: 2024-01-02
Attending: RADIOLOGY
Payer: OTHER GOVERNMENT

## 2024-01-02 DIAGNOSIS — R31.9 HEMATURIA, UNSPECIFIED TYPE: ICD-10-CM

## 2024-01-02 DIAGNOSIS — R31.9 HEMATURIA, UNSPECIFIED TYPE: Primary | ICD-10-CM

## 2024-01-02 LAB
BACTERIA #/AREA URNS HPF: ABNORMAL /HPF
BILIRUB UR QL STRIP: NEGATIVE
CLARITY UR: CLEAR
COLOR UR: YELLOW
GLUCOSE UR QL STRIP: NEGATIVE
HGB UR QL STRIP: ABNORMAL
KETONES UR QL STRIP: NEGATIVE
LEUKOCYTE ESTERASE UR QL STRIP: NEGATIVE
MICROSCOPIC COMMENT: ABNORMAL
NITRITE UR QL STRIP: NEGATIVE
PH UR STRIP: 6 [PH] (ref 5–8)
PROT UR QL STRIP: NEGATIVE
RBC #/AREA URNS HPF: >100 /HPF (ref 0–4)
SP GR UR STRIP: 1.01 (ref 1–1.03)
URN SPEC COLLECT METH UR: ABNORMAL
UROBILINOGEN UR STRIP-ACNC: NEGATIVE EU/DL
WBC #/AREA URNS HPF: 4 /HPF (ref 0–5)

## 2024-01-02 PROCEDURE — 81000 URINALYSIS NONAUTO W/SCOPE: CPT | Performed by: RADIOLOGY

## 2024-01-02 PROCEDURE — 77014 PR  CT GUIDANCE PLACEMENT RAD THERAPY FIELDS: CPT | Mod: 26,,, | Performed by: RADIOLOGY

## 2024-01-02 PROCEDURE — 77385 HC IMRT, SIMPLE: CPT | Performed by: RADIOLOGY

## 2024-01-02 RX ORDER — TAMSULOSIN HYDROCHLORIDE 0.4 MG/1
0.4 CAPSULE ORAL DAILY
Qty: 30 CAPSULE | Refills: 1 | Status: SHIPPED | OUTPATIENT
Start: 2024-01-02 | End: 2025-01-01

## 2024-01-03 PROCEDURE — 77014 PR  CT GUIDANCE PLACEMENT RAD THERAPY FIELDS: CPT | Mod: 26,,, | Performed by: RADIOLOGY

## 2024-01-03 PROCEDURE — 77385 HC IMRT, SIMPLE: CPT | Performed by: RADIOLOGY

## 2024-01-04 PROCEDURE — 77014 PR  CT GUIDANCE PLACEMENT RAD THERAPY FIELDS: CPT | Mod: 26,,, | Performed by: RADIOLOGY

## 2024-01-04 PROCEDURE — 77385 HC IMRT, SIMPLE: CPT | Performed by: RADIOLOGY

## 2024-01-05 PROCEDURE — 77385 HC IMRT, SIMPLE: CPT | Performed by: RADIOLOGY

## 2024-01-05 PROCEDURE — 77336 RADIATION PHYSICS CONSULT: CPT | Performed by: RADIOLOGY

## 2024-01-05 PROCEDURE — 77014 PR  CT GUIDANCE PLACEMENT RAD THERAPY FIELDS: CPT | Mod: 26,,, | Performed by: RADIOLOGY

## 2024-01-08 PROCEDURE — 77427 RADIATION TX MANAGEMENT X5: CPT | Mod: ,,, | Performed by: RADIOLOGY

## 2024-01-08 PROCEDURE — 77385 HC IMRT, SIMPLE: CPT | Performed by: RADIOLOGY

## 2024-01-08 PROCEDURE — 77014 PR  CT GUIDANCE PLACEMENT RAD THERAPY FIELDS: CPT | Mod: 26,,, | Performed by: RADIOLOGY

## 2024-01-09 ENCOUNTER — DOCUMENTATION ONLY (OUTPATIENT)
Dept: RADIATION ONCOLOGY | Facility: CLINIC | Age: 79
End: 2024-01-09
Payer: MEDICARE

## 2024-01-09 PROCEDURE — 77014 PR  CT GUIDANCE PLACEMENT RAD THERAPY FIELDS: CPT | Mod: 26,,, | Performed by: RADIOLOGY

## 2024-01-09 PROCEDURE — 77385 HC IMRT, SIMPLE: CPT | Performed by: RADIOLOGY

## 2024-01-10 PROCEDURE — 77385 HC IMRT, SIMPLE: CPT | Performed by: RADIOLOGY

## 2024-01-10 PROCEDURE — 77014 PR  CT GUIDANCE PLACEMENT RAD THERAPY FIELDS: CPT | Mod: 26,,, | Performed by: RADIOLOGY

## 2024-01-10 NOTE — PLAN OF CARE
Day 16 of outpatient radiation to the prostate. Mild dysuria, no hematuria. Increased urinary frequency. Intermittent episodes of diarrhea.

## 2024-01-11 PROCEDURE — 77014 PR  CT GUIDANCE PLACEMENT RAD THERAPY FIELDS: CPT | Mod: 26,,, | Performed by: RADIOLOGY

## 2024-01-11 PROCEDURE — 77385 HC IMRT, SIMPLE: CPT | Performed by: RADIOLOGY

## 2024-01-12 PROCEDURE — 77385 HC IMRT, SIMPLE: CPT | Performed by: RADIOLOGY

## 2024-01-12 PROCEDURE — 77014 PR  CT GUIDANCE PLACEMENT RAD THERAPY FIELDS: CPT | Mod: 26,,, | Performed by: RADIOLOGY

## 2024-01-15 PROCEDURE — 77336 RADIATION PHYSICS CONSULT: CPT | Performed by: RADIOLOGY

## 2024-01-16 PROCEDURE — 77014 PR  CT GUIDANCE PLACEMENT RAD THERAPY FIELDS: CPT | Mod: 26,,, | Performed by: RADIOLOGY

## 2024-01-16 PROCEDURE — 77427 RADIATION TX MANAGEMENT X5: CPT | Mod: ,,, | Performed by: RADIOLOGY

## 2024-01-16 PROCEDURE — 77385 HC IMRT, SIMPLE: CPT | Performed by: RADIOLOGY

## 2024-01-17 PROCEDURE — 77385 HC IMRT, SIMPLE: CPT | Performed by: RADIOLOGY

## 2024-01-17 PROCEDURE — 77014 PR  CT GUIDANCE PLACEMENT RAD THERAPY FIELDS: CPT | Mod: 26,,, | Performed by: RADIOLOGY

## 2024-01-18 PROCEDURE — 77014 PR  CT GUIDANCE PLACEMENT RAD THERAPY FIELDS: CPT | Mod: 26,,, | Performed by: RADIOLOGY

## 2024-01-18 PROCEDURE — 77385 HC IMRT, SIMPLE: CPT | Performed by: RADIOLOGY

## 2024-01-19 PROCEDURE — 77385 HC IMRT, SIMPLE: CPT | Performed by: RADIOLOGY

## 2024-01-19 PROCEDURE — 77014 PR  CT GUIDANCE PLACEMENT RAD THERAPY FIELDS: CPT | Mod: 26,,, | Performed by: RADIOLOGY

## 2024-01-22 ENCOUNTER — DOCUMENTATION ONLY (OUTPATIENT)
Dept: RADIATION ONCOLOGY | Facility: CLINIC | Age: 79
End: 2024-01-22
Payer: MEDICARE

## 2024-01-22 DIAGNOSIS — C61 PROSTATE CANCER: Primary | ICD-10-CM

## 2024-01-22 PROCEDURE — 77336 RADIATION PHYSICS CONSULT: CPT | Performed by: RADIOLOGY

## 2024-01-22 PROCEDURE — 77385 HC IMRT, SIMPLE: CPT | Performed by: RADIOLOGY

## 2024-01-22 PROCEDURE — 77014 PR  CT GUIDANCE PLACEMENT RAD THERAPY FIELDS: CPT | Mod: 26,,, | Performed by: RADIOLOGY

## 2024-01-22 RX ORDER — MEGESTROL ACETATE 40 MG/1
40 TABLET ORAL DAILY
Qty: 30 TABLET | Refills: 11 | Status: SHIPPED | OUTPATIENT
Start: 2024-01-22 | End: 2025-01-21

## 2024-01-22 NOTE — PLAN OF CARE
Day 25 of outpatient radiation to the prostate. Doing well. Mild dysuria, no hematuria. Nocturia x 3. Intermittent episodes of diarrhea, using Imodium prn.

## 2024-01-23 PROCEDURE — 77385 HC IMRT, SIMPLE: CPT | Performed by: RADIOLOGY

## 2024-01-23 PROCEDURE — 77014 PR  CT GUIDANCE PLACEMENT RAD THERAPY FIELDS: CPT | Mod: 26,,, | Performed by: RADIOLOGY

## 2024-01-24 PROCEDURE — 77014 PR  CT GUIDANCE PLACEMENT RAD THERAPY FIELDS: CPT | Mod: 26,,, | Performed by: RADIOLOGY

## 2024-01-24 PROCEDURE — 77385 HC IMRT, SIMPLE: CPT | Performed by: RADIOLOGY

## 2024-01-25 PROCEDURE — 77014 PR  CT GUIDANCE PLACEMENT RAD THERAPY FIELDS: CPT | Mod: 26,,, | Performed by: RADIOLOGY

## 2024-01-25 PROCEDURE — 77385 HC IMRT, SIMPLE: CPT | Performed by: RADIOLOGY

## 2024-01-29 PROCEDURE — 77336 RADIATION PHYSICS CONSULT: CPT | Performed by: RADIOLOGY

## 2024-01-30 ENCOUNTER — DOCUMENTATION ONLY (OUTPATIENT)
Dept: RADIATION ONCOLOGY | Facility: CLINIC | Age: 79
End: 2024-01-30
Payer: MEDICARE

## 2024-01-30 NOTE — PLAN OF CARE
Completed 28/28 of outpatient radiation to the prostate on January 25th. Tolerated therapy well. RTC in 2-3 mos w/psa.

## 2024-02-22 ENCOUNTER — TELEPHONE (OUTPATIENT)
Dept: RADIATION ONCOLOGY | Facility: CLINIC | Age: 79
End: 2024-02-22
Payer: MEDICARE

## 2024-03-14 ENCOUNTER — PATIENT MESSAGE (OUTPATIENT)
Dept: RADIATION ONCOLOGY | Facility: CLINIC | Age: 79
End: 2024-03-14
Payer: MEDICARE

## 2024-03-25 ENCOUNTER — OFFICE VISIT (OUTPATIENT)
Dept: RADIATION ONCOLOGY | Facility: CLINIC | Age: 79
End: 2024-03-25
Attending: RADIOLOGY
Payer: OTHER GOVERNMENT

## 2024-03-25 VITALS
HEART RATE: 91 BPM | WEIGHT: 237 LBS | DIASTOLIC BLOOD PRESSURE: 76 MMHG | BODY MASS INDEX: 32.1 KG/M2 | RESPIRATION RATE: 16 BRPM | SYSTOLIC BLOOD PRESSURE: 140 MMHG | HEIGHT: 72 IN

## 2024-03-25 DIAGNOSIS — C61 PROSTATE CANCER: Primary | ICD-10-CM

## 2024-03-25 PROCEDURE — 99999 PR PBB SHADOW E&M-EST. PATIENT-LVL III: CPT | Mod: PBBFAC,,, | Performed by: RADIOLOGY

## 2024-03-25 PROCEDURE — 99213 OFFICE O/P EST LOW 20 MIN: CPT | Mod: PBBFAC | Performed by: RADIOLOGY

## 2024-03-25 PROCEDURE — 99024 POSTOP FOLLOW-UP VISIT: CPT | Mod: S$PBB,,, | Performed by: RADIOLOGY

## 2024-03-25 RX ORDER — AMLODIPINE BESYLATE 10 MG/1
0.5 TABLET ORAL DAILY
COMMUNITY
Start: 2023-12-27

## 2024-03-25 NOTE — PROGRESS NOTES
[facility] - Radiation Oncology     Patient ID: Franko Smith is a 78 y.o. male.    Chief Complaint: Prostate Cancer (F/u after xrt;psa 0.05)      Mr. 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  He initially presented for evaluation of increased PSA velocity.  PSA in February of 2023 was 3.38 ng/ml.  MRI in May of 2023 revealed a 54 cc prostate with a 1.4 cm PI-RADS 4 lesion in the Rt. mid lateral peripheral zone without extraprostatic extension.  The seminal vesicles and neurovascular bundles were unremarkable.  There was no adenopathy.  Biopsies on 7/25/23 revealed Mode 8 (3+5) adenocarcinoma involving 25% of the core from the Rt. medial base.  Thre was [place] 7 (4+3) disease at the #4 BOB.  Mode 7 (3+4) adenocarcinoma involved 10 - 85% of the cores from the Rt. lateral base, Rt. lateral mid gland, Rt. medial mid gland, and #3 BOB.  There was [place] 6 (3+3) adenocarcinoma involving cores from the Rt. lateral apex. Overall tumor involved 7/16 sites and 14/24 cores.  PSMA scan revealed focal uptake in the prostate with no evidence of regional or distant metastatic disease.  The patient began  hormonal therapy and completed radiotherapy to the prostate, seminal vesicles and pelvic nodes on 1/25/24.  Today the patient states he feels well.  Still notes some fatigue.  Using Megace for hot flashes.        Review of Systems   Constitutional:  Positive for fatigue. Negative for activity change, appetite change, chills and fever.   Gastrointestinal:  Negative for constipation, diarrhea and fecal incontinence.   Genitourinary:  Positive for bladder incontinence (nodes occassional mild urge incontinence), dysuria (mild pain with starting urine.), erectile dysfunction and frequency. Negative for difficulty urinating.       Physical Exam  Constitutional:       General: He is not in acute distress.     Appearance: Normal appearance.   Neurological:       Mental Status: He is alert and oriented to person, place, and time.   Psychiatric:         Mood and Affect: Mood normal.         Judgment: Judgment normal.     [facility]       Assessment and Plan   Prostate cancer - recovering from the acute effects of therapy.  Will  plan to continue tamsulosin.  Instructed patient on Kegel exercises.  [facility] is [facility].   Plan follow up with us in 6 months.

## 2024-06-18 DIAGNOSIS — C61 PROSTATE CANCER: Primary | ICD-10-CM

## 2024-09-16 ENCOUNTER — LAB VISIT (OUTPATIENT)
Dept: LAB | Facility: OTHER | Age: 79
End: 2024-09-16
Attending: RADIOLOGY
Payer: MEDICARE

## 2024-09-16 DIAGNOSIS — C61 PROSTATE CANCER: ICD-10-CM

## 2024-09-16 LAB — COMPLEXED PSA SERPL-MCNC: 0.01 NG/ML (ref 0–4)

## 2024-09-16 PROCEDURE — 36415 COLL VENOUS BLD VENIPUNCTURE: CPT | Performed by: RADIOLOGY

## 2024-09-16 PROCEDURE — 84153 ASSAY OF PSA TOTAL: CPT | Performed by: RADIOLOGY

## 2024-09-23 ENCOUNTER — OFFICE VISIT (OUTPATIENT)
Dept: RADIATION ONCOLOGY | Facility: CLINIC | Age: 79
End: 2024-09-23
Attending: RADIOLOGY
Payer: MEDICARE

## 2024-09-23 VITALS
HEART RATE: 85 BPM | RESPIRATION RATE: 16 BRPM | SYSTOLIC BLOOD PRESSURE: 143 MMHG | DIASTOLIC BLOOD PRESSURE: 72 MMHG | BODY MASS INDEX: 32.34 KG/M2 | HEIGHT: 72 IN | WEIGHT: 238.81 LBS

## 2024-09-23 DIAGNOSIS — C61 PROSTATE CANCER: Primary | ICD-10-CM

## 2024-09-23 PROCEDURE — 3077F SYST BP >= 140 MM HG: CPT | Mod: CPTII,S$GLB,, | Performed by: RADIOLOGY

## 2024-09-23 PROCEDURE — 3078F DIAST BP <80 MM HG: CPT | Mod: CPTII,S$GLB,, | Performed by: RADIOLOGY

## 2024-09-23 PROCEDURE — 1159F MED LIST DOCD IN RCRD: CPT | Mod: CPTII,S$GLB,, | Performed by: RADIOLOGY

## 2024-09-23 PROCEDURE — 99999 PR PBB SHADOW E&M-EST. PATIENT-LVL IV: CPT | Mod: PBBFAC,,, | Performed by: RADIOLOGY

## 2024-09-23 PROCEDURE — 1101F PT FALLS ASSESS-DOCD LE1/YR: CPT | Mod: CPTII,S$GLB,, | Performed by: RADIOLOGY

## 2024-09-23 PROCEDURE — 99212 OFFICE O/P EST SF 10 MIN: CPT | Mod: S$GLB,,, | Performed by: RADIOLOGY

## 2024-09-23 PROCEDURE — 1126F AMNT PAIN NOTED NONE PRSNT: CPT | Mod: CPTII,S$GLB,, | Performed by: RADIOLOGY

## 2024-09-23 PROCEDURE — 3288F FALL RISK ASSESSMENT DOCD: CPT | Mod: CPTII,S$GLB,, | Performed by: RADIOLOGY

## 2024-09-23 NOTE — PROGRESS NOTES
[facility] - Radiation Oncology     Patient ID: Franko Smith is a 79 y.o. male.    Chief Complaint: Prostate Cancer (6 mo f/u;psa)      Mr. 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  He initially presented for evaluation of increased PSA velocity.  PSA in February of 2023 was 3.38 ng/ml.  MRI in May of 2023 revealed a 54 cc prostate with a 1.4 cm PI-RADS 4 lesion in the Rt. mid lateral peripheral zone without extraprostatic extension.  The seminal vesicles and neurovascular bundles were unremarkable.  There was no adenopathy.  Biopsies on 7/25/23 revealed [place] 8 (3+5) adenocarcinoma involving 25% of the core from the Rt. medial base.  Thre was Mode 7 (4+3) disease at the #4 BOB.  [place] 7 (3+4) adenocarcinoma involved 10 - 85% of the cores from the Rt. lateral base, Rt. lateral mid gland, Rt. medial mid gland, and #3 BOB.  There was [place] 6 (3+3) adenocarcinoma involving cores from the Rt. lateral apex. Overall tumor involved 7/16 sites and 14/24 cores.  PSMA scan revealed focal uptake in the prostate with no evidence of regional or distant metastatic disease.  The patient began  hormonal therapy and completed radiotherapy to the prostate, seminal vesicles and pelvic nodes on 1/25/24. [facility].  Today the patient states he feels fair.  Notes some fatigue.  Notes some nocturia and dysuria at the end of his urinary stream.        Review of Systems   Constitutional:  Positive for fatigue. Negative for activity change, appetite change, chills and fever.   Gastrointestinal:  Negative for constipation, diarrhea and fecal incontinence.   Genitourinary:  Positive for difficulty urinating (some difficulty emptying bladder), dysuria and frequency. Negative for bladder incontinence and hematuria.       Physical Exam  Constitutional:       General: He is not in acute distress.     Appearance: Normal  appearance.   Neurological:      Mental Status: He is alert and oriented to person, place, and time.   Psychiatric:         Mood and Affect: Mood normal.         Judgment: Judgment normal.        Latest Reference Range & Units 09/16/24 10:00   PSA Diagnostic 0.00 - 4.00 ng/mL 0.01        Assessment and Plan    Prostate cancer - doing well  no evidence of tumor progression or recurrence.  Planned for his 3rd injection of Lupron next month.  Plan follow up in 6 months with PSA.    Dysuria.- will plan to increase his tamsulosin to 0.8 mg per day.  will check progress in 2 weeks.

## 2024-09-23 NOTE — Clinical Note
with PSA   Plan phone review in 2 weeks to determine if increasing his tamsulosin has improved his urinary pain.

## 2024-12-11 ENCOUNTER — PATIENT MESSAGE (OUTPATIENT)
Dept: RADIATION ONCOLOGY | Facility: CLINIC | Age: 79
End: 2024-12-11
Payer: MEDICARE

## 2024-12-12 DIAGNOSIS — C61 PROSTATE CANCER: Primary | ICD-10-CM

## 2024-12-12 RX ORDER — TAMSULOSIN HYDROCHLORIDE 0.4 MG/1
0.8 CAPSULE ORAL DAILY
Qty: 180 CAPSULE | Refills: 3 | Status: SHIPPED | OUTPATIENT
Start: 2024-12-12 | End: 2025-12-12

## 2025-01-01 ENCOUNTER — HOSPITAL ENCOUNTER (INPATIENT)
Facility: HOSPITAL | Age: 80
LOS: 4 days | DRG: 951 | End: 2025-07-04
Attending: EMERGENCY MEDICINE | Admitting: EMERGENCY MEDICINE
Payer: OTHER MISCELLANEOUS

## 2025-01-01 ENCOUNTER — APPOINTMENT (OUTPATIENT)
Dept: RADIOLOGY | Facility: HOSPITAL | Age: 80
End: 2025-01-01
Payer: MEDICARE

## 2025-01-01 ENCOUNTER — HOSPITAL ENCOUNTER (INPATIENT)
Facility: HOSPITAL | Age: 80
LOS: 9 days | Discharge: HOSPICE | DRG: 270 | End: 2025-06-30
Attending: EMERGENCY MEDICINE | Admitting: PSYCHIATRY & NEUROLOGY
Payer: MEDICARE

## 2025-01-01 VITALS
WEIGHT: 229.06 LBS | TEMPERATURE: 98 F | HEIGHT: 72 IN | HEART RATE: 104 BPM | SYSTOLIC BLOOD PRESSURE: 127 MMHG | RESPIRATION RATE: 26 BRPM | DIASTOLIC BLOOD PRESSURE: 58 MMHG | BODY MASS INDEX: 31.03 KG/M2

## 2025-01-01 VITALS
HEART RATE: 141 BPM | SYSTOLIC BLOOD PRESSURE: 121 MMHG | DIASTOLIC BLOOD PRESSURE: 57 MMHG | OXYGEN SATURATION: 90 % | TEMPERATURE: 102 F

## 2025-01-01 DIAGNOSIS — I61.9 NONTRAUMATIC INTRACEREBRAL HEMORRHAGE, UNSPECIFIED CEREBRAL LOCATION, UNSPECIFIED LATERALITY: Primary | ICD-10-CM

## 2025-01-01 DIAGNOSIS — R79.89 TROPONIN I ABOVE REFERENCE RANGE: ICD-10-CM

## 2025-01-01 DIAGNOSIS — I82.409 DVT (DEEP VENOUS THROMBOSIS): ICD-10-CM

## 2025-01-01 DIAGNOSIS — I26.99 PULMONARY EMBOLISM: ICD-10-CM

## 2025-01-01 DIAGNOSIS — I61.9 NONTRAUMATIC INTRACEREBRAL HEMORRHAGE, UNSPECIFIED CEREBRAL LOCATION, UNSPECIFIED LATERALITY: ICD-10-CM

## 2025-01-01 DIAGNOSIS — Z51.5 COMFORT MEASURES ONLY STATUS: Primary | ICD-10-CM

## 2025-01-01 DIAGNOSIS — R11.2 NAUSEA & VOMITING: ICD-10-CM

## 2025-01-01 DIAGNOSIS — I46.9 CARDIAC ARREST: ICD-10-CM

## 2025-01-01 LAB
ABO + RH BLD: NORMAL
ABORH RETYPE: NORMAL
ABSOLUTE EOSINOPHIL (OHS): 0 K/UL
ABSOLUTE EOSINOPHIL (OHS): 0.01 K/UL
ABSOLUTE EOSINOPHIL (OHS): 0.05 K/UL
ABSOLUTE EOSINOPHIL (OHS): 0.08 K/UL
ABSOLUTE EOSINOPHIL (OHS): 0.09 K/UL
ABSOLUTE EOSINOPHIL (OHS): 0.12 K/UL
ABSOLUTE EOSINOPHIL (OHS): 0.12 K/UL
ABSOLUTE EOSINOPHIL (OHS): 0.13 K/UL
ABSOLUTE EOSINOPHIL (OHS): 0.14 K/UL
ABSOLUTE EOSINOPHIL (OHS): 0.15 K/UL
ABSOLUTE EOSINOPHIL (OHS): 0.17 K/UL
ABSOLUTE EOSINOPHIL (OHS): 0.17 K/UL
ABSOLUTE EOSINOPHIL (OHS): 0.18 K/UL
ABSOLUTE EOSINOPHIL (OHS): 0.18 K/UL
ABSOLUTE EOSINOPHIL (OHS): 0.19 K/UL
ABSOLUTE EOSINOPHIL (OHS): 0.19 K/UL
ABSOLUTE MONOCYTE (OHS): 0.49 K/UL (ref 0.3–1)
ABSOLUTE MONOCYTE (OHS): 0.54 K/UL (ref 0.3–1)
ABSOLUTE MONOCYTE (OHS): 0.57 K/UL (ref 0.3–1)
ABSOLUTE MONOCYTE (OHS): 0.59 K/UL (ref 0.3–1)
ABSOLUTE MONOCYTE (OHS): 0.62 K/UL (ref 0.3–1)
ABSOLUTE MONOCYTE (OHS): 0.63 K/UL (ref 0.3–1)
ABSOLUTE MONOCYTE (OHS): 0.63 K/UL (ref 0.3–1)
ABSOLUTE MONOCYTE (OHS): 0.67 K/UL (ref 0.3–1)
ABSOLUTE MONOCYTE (OHS): 0.69 K/UL (ref 0.3–1)
ABSOLUTE MONOCYTE (OHS): 0.7 K/UL (ref 0.3–1)
ABSOLUTE MONOCYTE (OHS): 0.7 K/UL (ref 0.3–1)
ABSOLUTE MONOCYTE (OHS): 0.72 K/UL (ref 0.3–1)
ABSOLUTE MONOCYTE (OHS): 0.75 K/UL (ref 0.3–1)
ABSOLUTE MONOCYTE (OHS): 0.85 K/UL (ref 0.3–1)
ABSOLUTE MONOCYTE (OHS): 0.87 K/UL (ref 0.3–1)
ABSOLUTE MONOCYTE (OHS): 0.88 K/UL (ref 0.3–1)
ABSOLUTE MONOCYTE (OHS): 0.9 K/UL (ref 0.3–1)
ABSOLUTE MONOCYTE (OHS): 0.93 K/UL (ref 0.3–1)
ABSOLUTE NEUTROPHIL COUNT (OHS): 4.38 K/UL (ref 1.8–7.7)
ABSOLUTE NEUTROPHIL COUNT (OHS): 4.62 K/UL (ref 1.8–7.7)
ABSOLUTE NEUTROPHIL COUNT (OHS): 4.7 K/UL (ref 1.8–7.7)
ABSOLUTE NEUTROPHIL COUNT (OHS): 4.71 K/UL (ref 1.8–7.7)
ABSOLUTE NEUTROPHIL COUNT (OHS): 4.84 K/UL (ref 1.8–7.7)
ABSOLUTE NEUTROPHIL COUNT (OHS): 4.85 K/UL (ref 1.8–7.7)
ABSOLUTE NEUTROPHIL COUNT (OHS): 4.86 K/UL (ref 1.8–7.7)
ABSOLUTE NEUTROPHIL COUNT (OHS): 5.07 K/UL (ref 1.8–7.7)
ABSOLUTE NEUTROPHIL COUNT (OHS): 5.07 K/UL (ref 1.8–7.7)
ABSOLUTE NEUTROPHIL COUNT (OHS): 5.2 K/UL (ref 1.8–7.7)
ABSOLUTE NEUTROPHIL COUNT (OHS): 5.84 K/UL (ref 1.8–7.7)
ABSOLUTE NEUTROPHIL COUNT (OHS): 6.04 K/UL (ref 1.8–7.7)
ABSOLUTE NEUTROPHIL COUNT (OHS): 6.16 K/UL (ref 1.8–7.7)
ABSOLUTE NEUTROPHIL COUNT (OHS): 6.19 K/UL (ref 1.8–7.7)
ABSOLUTE NEUTROPHIL COUNT (OHS): 6.26 K/UL (ref 1.8–7.7)
ABSOLUTE NEUTROPHIL COUNT (OHS): 7.2 K/UL (ref 1.8–7.7)
ABSOLUTE NEUTROPHIL COUNT (OHS): 7.27 K/UL (ref 1.8–7.7)
ABSOLUTE NEUTROPHIL COUNT (OHS): 7.52 K/UL (ref 1.8–7.7)
ALBUMIN SERPL BCP-MCNC: 2.2 G/DL (ref 3.5–5.2)
ALBUMIN SERPL BCP-MCNC: 2.3 G/DL (ref 3.5–5.2)
ALBUMIN SERPL BCP-MCNC: 2.4 G/DL (ref 3.5–5.2)
ALBUMIN SERPL BCP-MCNC: 2.5 G/DL (ref 3.5–5.2)
ALBUMIN SERPL BCP-MCNC: 3.3 G/DL (ref 3.5–5.2)
ALBUMIN SERPL BCP-MCNC: 3.4 G/DL (ref 3.5–5.2)
ALBUMIN SERPL BCP-MCNC: 3.4 G/DL (ref 3.5–5.2)
ALBUMIN SERPL BCP-MCNC: 3.5 G/DL (ref 3.5–5.2)
ALBUMIN SERPL BCP-MCNC: 3.5 G/DL (ref 3.5–5.2)
ALBUMIN SERPL BCP-MCNC: 3.9 G/DL (ref 3.5–5.2)
ALBUMIN SERPL BCP-MCNC: 4 G/DL (ref 3.5–5.2)
ALLENS TEST: ABNORMAL
ALP SERPL-CCNC: 63 UNIT/L (ref 40–150)
ALP SERPL-CCNC: 63 UNIT/L (ref 40–150)
ALP SERPL-CCNC: 64 UNIT/L (ref 40–150)
ALP SERPL-CCNC: 65 UNIT/L (ref 40–150)
ALP SERPL-CCNC: 68 UNIT/L (ref 40–150)
ALP SERPL-CCNC: 69 UNIT/L (ref 40–150)
ALP SERPL-CCNC: 70 UNIT/L (ref 40–150)
ALP SERPL-CCNC: 74 UNIT/L (ref 40–150)
ALP SERPL-CCNC: 77 UNIT/L (ref 40–150)
ALP SERPL-CCNC: 77 UNIT/L (ref 40–150)
ALP SERPL-CCNC: 80 UNIT/L (ref 40–150)
ALP SERPL-CCNC: 82 UNIT/L (ref 40–150)
ALP SERPL-CCNC: 84 UNIT/L (ref 40–150)
ALP SERPL-CCNC: 85 UNIT/L (ref 40–150)
ALP SERPL-CCNC: 87 UNIT/L (ref 40–150)
ALT SERPL W/O P-5'-P-CCNC: 12 UNIT/L (ref 10–44)
ALT SERPL W/O P-5'-P-CCNC: 13 UNIT/L (ref 10–44)
ALT SERPL W/O P-5'-P-CCNC: 14 UNIT/L (ref 10–44)
ALT SERPL W/O P-5'-P-CCNC: 15 UNIT/L (ref 10–44)
ALT SERPL W/O P-5'-P-CCNC: 16 UNIT/L (ref 10–44)
ALT SERPL W/O P-5'-P-CCNC: 36 UNIT/L (ref 10–44)
ALT SERPL W/O P-5'-P-CCNC: 36 UNIT/L (ref 10–44)
ALT SERPL W/O P-5'-P-CCNC: 37 UNIT/L (ref 10–44)
ALT SERPL W/O P-5'-P-CCNC: 43 UNIT/L (ref 10–44)
ALT SERPL W/O P-5'-P-CCNC: 44 UNIT/L (ref 10–44)
ALT SERPL W/O P-5'-P-CCNC: 46 UNIT/L (ref 10–44)
ALT SERPL W/O P-5'-P-CCNC: 54 UNIT/L (ref 10–44)
ALT SERPL W/O P-5'-P-CCNC: 59 UNIT/L (ref 10–44)
ALT SERPL W/O P-5'-P-CCNC: 72 UNIT/L (ref 10–44)
ALT SERPL W/O P-5'-P-CCNC: 89 UNIT/L (ref 10–44)
ANION GAP (OHS): 10 MMOL/L (ref 8–16)
ANION GAP (OHS): 11 MMOL/L (ref 8–16)
ANION GAP (OHS): 11 MMOL/L (ref 8–16)
ANION GAP (OHS): 12 MMOL/L (ref 8–16)
ANION GAP (OHS): 13 MMOL/L (ref 8–16)
ANION GAP (OHS): 6 MMOL/L (ref 8–16)
ANION GAP (OHS): 6 MMOL/L (ref 8–16)
ANION GAP (OHS): 7 MMOL/L (ref 8–16)
ANION GAP (OHS): 8 MMOL/L (ref 8–16)
ANION GAP (OHS): 9 MMOL/L (ref 8–16)
AORTIC SIZE INDEX (SOV): 1.4 CM/M2
AORTIC SIZE INDEX (SOV): 1.4 CM/M2
AORTIC SIZE INDEX: 1.3 CM/M2
APTT PPP: 101.7 SECONDS (ref 21–32)
APTT PPP: 110.5 SECONDS (ref 21–32)
APTT PPP: 122.7 SECONDS (ref 21–32)
APTT PPP: 124.7 SECONDS (ref 21–32)
APTT PPP: 125.9 SECONDS (ref 21–32)
APTT PPP: 31.6 SECONDS (ref 21–32)
APTT PPP: 31.6 SECONDS (ref 21–32)
APTT PPP: 32.4 SECONDS (ref 21–32)
APTT PPP: 35.2 SECONDS (ref 21–32)
APTT PPP: 37.8 SECONDS (ref 21–32)
APTT PPP: 49.4 SECONDS (ref 21–32)
APTT PPP: 53.5 SECONDS (ref 21–32)
APTT PPP: 66.9 SECONDS (ref 21–32)
APTT PPP: 67.2 SECONDS (ref 21–32)
APTT PPP: 67.8 SECONDS (ref 21–32)
APTT PPP: 71.5 SECONDS (ref 21–32)
APTT PPP: 73.3 SECONDS (ref 21–32)
APTT PPP: 73.5 SECONDS (ref 21–32)
APTT PPP: 76.2 SECONDS (ref 21–32)
APTT PPP: 78.8 SECONDS (ref 21–32)
APTT PPP: 82 SECONDS (ref 21–32)
APTT PPP: 82.7 SECONDS (ref 21–32)
APTT PPP: 86.5 SECONDS (ref 21–32)
APTT PPP: 87.7 SECONDS (ref 21–32)
APTT PPP: 89.8 SECONDS (ref 21–32)
APTT PPP: 98.8 SECONDS (ref 21–32)
APTT PPP: >150 SECONDS (ref 21–32)
ASCENDING AORTA: 3 CM
AST SERPL-CCNC: 10 UNIT/L (ref 11–45)
AST SERPL-CCNC: 12 UNIT/L (ref 11–45)
AST SERPL-CCNC: 13 UNIT/L (ref 11–45)
AST SERPL-CCNC: 13 UNIT/L (ref 11–45)
AST SERPL-CCNC: 15 UNIT/L (ref 11–45)
AST SERPL-CCNC: 37 UNIT/L (ref 11–45)
AST SERPL-CCNC: 38 UNIT/L (ref 11–45)
AST SERPL-CCNC: 40 UNIT/L (ref 11–45)
AST SERPL-CCNC: 40 UNIT/L (ref 11–45)
AST SERPL-CCNC: 41 UNIT/L (ref 11–45)
AST SERPL-CCNC: 41 UNIT/L (ref 11–45)
AST SERPL-CCNC: 42 UNIT/L (ref 11–45)
AST SERPL-CCNC: 43 UNIT/L (ref 11–45)
AST SERPL-CCNC: 78 UNIT/L (ref 11–45)
AST SERPL-CCNC: 90 UNIT/L (ref 11–45)
AV AREA BY CONTINUOUS VTI: 3.3 CM2
AV AREA BY CONTINUOUS VTI: 3.6 CM2
AV INDEX (PROSTH): 0.87
AV INDEX (PROSTH): 1.19
AV LVOT MEAN GRADIENT: 1 MMHG
AV LVOT MEAN GRADIENT: 2 MMHG
AV LVOT PEAK GRADIENT: 2 MMHG
AV LVOT PEAK GRADIENT: 3 MMHG
AV MEAN GRADIENT: 2 MMHG
AV MEAN GRADIENT: 3 MMHG
AV PEAK GRADIENT: 3 MMHG
AV PEAK GRADIENT: 4 MMHG
AV VALVE AREA BY VELOCITY RATIO: 2.8 CM²
AV VALVE AREA BY VELOCITY RATIO: 3 CM²
AV VALVE AREA: 3.3 CM2
AV VALVE AREA: 3.7 CM2
AV VELOCITY RATIO: 0.78
AV VELOCITY RATIO: 0.9
BACTERIA #/AREA URNS AUTO: ABNORMAL /HPF
BACTERIA BLD CULT: NORMAL
BACTERIA BLD CULT: NORMAL
BACTERIA SPT CULT: NORMAL
BASOPHILS # BLD AUTO: 0.01 K/UL
BASOPHILS # BLD AUTO: 0.02 K/UL
BASOPHILS # BLD AUTO: 0.03 K/UL
BASOPHILS NFR BLD AUTO: 0.1 %
BASOPHILS NFR BLD AUTO: 0.2 %
BASOPHILS NFR BLD AUTO: 0.3 %
BASOPHILS NFR BLD AUTO: 0.4 %
BASOPHILS NFR BLD AUTO: 0.5 %
BILIRUB DIRECT SERPL-MCNC: 0.4 MG/DL (ref 0.1–0.3)
BILIRUB DIRECT SERPL-MCNC: 0.4 MG/DL (ref 0.1–0.3)
BILIRUB DIRECT SERPL-MCNC: 0.5 MG/DL (ref 0.1–0.3)
BILIRUB SERPL-MCNC: 1 MG/DL (ref 0.1–1)
BILIRUB SERPL-MCNC: 1 MG/DL (ref 0.1–1)
BILIRUB SERPL-MCNC: 1.1 MG/DL (ref 0.1–1)
BILIRUB SERPL-MCNC: 1.2 MG/DL (ref 0.1–1)
BILIRUB SERPL-MCNC: 1.3 MG/DL (ref 0.1–1)
BILIRUB SERPL-MCNC: 1.3 MG/DL (ref 0.1–1)
BILIRUB SERPL-MCNC: 1.4 MG/DL (ref 0.1–1)
BILIRUB SERPL-MCNC: 1.4 MG/DL (ref 0.1–1)
BILIRUB SERPL-MCNC: 1.5 MG/DL (ref 0.1–1)
BILIRUB SERPL-MCNC: 1.7 MG/DL (ref 0.1–1)
BILIRUB SERPL-MCNC: 1.9 MG/DL (ref 0.1–1)
BILIRUB SERPL-MCNC: 2.6 MG/DL (ref 0.1–1)
BILIRUB SERPL-MCNC: 4.3 MG/DL (ref 0.1–1)
BILIRUB UR QL STRIP.AUTO: NEGATIVE
BILIRUB UR QL STRIP.AUTO: NEGATIVE
BLD PROD TYP BPU: NORMAL
BLOOD UNIT EXPIRATION DATE: NORMAL
BLOOD UNIT TYPE CODE: 5100
BNP SERPL-MCNC: <10 PG/ML (ref 0–99)
BSA FOR ECHO PROCEDURE: 2.3 M2
BSA FOR ECHO PROCEDURE: 2.3 M2
BUN SERPL-MCNC: 22 MG/DL (ref 8–23)
BUN SERPL-MCNC: 23 MG/DL (ref 8–23)
BUN SERPL-MCNC: 24 MG/DL (ref 8–23)
BUN SERPL-MCNC: 25 MG/DL (ref 8–23)
BUN SERPL-MCNC: 26 MG/DL (ref 8–23)
BUN SERPL-MCNC: 27 MG/DL (ref 8–23)
BUN SERPL-MCNC: 28 MG/DL (ref 8–23)
BUN SERPL-MCNC: 30 MG/DL (ref 8–23)
BUN SERPL-MCNC: 31 MG/DL (ref 8–23)
BUN SERPL-MCNC: 33 MG/DL (ref 8–23)
BUN SERPL-MCNC: 33 MG/DL (ref 8–23)
BUN SERPL-MCNC: 35 MG/DL (ref 6–30)
BUN SERPL-MCNC: 35 MG/DL (ref 8–23)
BUN SERPL-MCNC: 35 MG/DL (ref 8–23)
CA-I BLD-SCNC: 1.08 MMOL/L (ref 1.06–1.42)
CA-I BLD-SCNC: 1.09 MMOL/L (ref 1.06–1.42)
CA-I BLD-SCNC: 1.09 MMOL/L (ref 1.06–1.42)
CA-I BLD-SCNC: 1.12 MMOL/L (ref 1.06–1.42)
CA-I BLD-SCNC: 1.13 MMOL/L (ref 1.06–1.42)
CA-I BLD-SCNC: 1.14 MMOL/L (ref 1.06–1.42)
CALCIUM SERPL-MCNC: 7.2 MG/DL (ref 8.7–10.5)
CALCIUM SERPL-MCNC: 7.3 MG/DL (ref 8.7–10.5)
CALCIUM SERPL-MCNC: 7.5 MG/DL (ref 8.7–10.5)
CALCIUM SERPL-MCNC: 7.6 MG/DL (ref 8.7–10.5)
CALCIUM SERPL-MCNC: 7.7 MG/DL (ref 8.7–10.5)
CALCIUM SERPL-MCNC: 7.8 MG/DL (ref 8.7–10.5)
CALCIUM SERPL-MCNC: 7.9 MG/DL (ref 8.7–10.5)
CALCIUM SERPL-MCNC: 8.1 MG/DL (ref 8.7–10.5)
CALCIUM SERPL-MCNC: 8.2 MG/DL (ref 8.7–10.5)
CALCIUM SERPL-MCNC: 8.9 MG/DL (ref 8.7–10.5)
CALCIUM SERPL-MCNC: 8.9 MG/DL (ref 8.7–10.5)
CHLORIDE SERPL-SCNC: 102 MMOL/L (ref 95–110)
CHLORIDE SERPL-SCNC: 104 MMOL/L (ref 95–110)
CHLORIDE SERPL-SCNC: 105 MMOL/L (ref 95–110)
CHLORIDE SERPL-SCNC: 107 MMOL/L (ref 95–110)
CHLORIDE SERPL-SCNC: 108 MMOL/L (ref 95–110)
CHLORIDE SERPL-SCNC: 108 MMOL/L (ref 95–110)
CHLORIDE SERPL-SCNC: 109 MMOL/L (ref 95–110)
CHLORIDE SERPL-SCNC: 110 MMOL/L (ref 95–110)
CHLORIDE SERPL-SCNC: 111 MMOL/L (ref 95–110)
CHOLEST SERPL-MCNC: 111 MG/DL (ref 120–199)
CHOLEST/HDLC SERPL: 3.5 {RATIO} (ref 2–5)
CLARITY UR: ABNORMAL
CLARITY UR: CLEAR
CO2 SERPL-SCNC: 14 MMOL/L (ref 23–29)
CO2 SERPL-SCNC: 16 MMOL/L (ref 23–29)
CO2 SERPL-SCNC: 16 MMOL/L (ref 23–29)
CO2 SERPL-SCNC: 17 MMOL/L (ref 23–29)
CO2 SERPL-SCNC: 17 MMOL/L (ref 23–29)
CO2 SERPL-SCNC: 18 MMOL/L (ref 23–29)
CO2 SERPL-SCNC: 19 MMOL/L (ref 23–29)
CO2 SERPL-SCNC: 20 MMOL/L (ref 23–29)
CO2 SERPL-SCNC: 21 MMOL/L (ref 23–29)
CO2 SERPL-SCNC: 21 MMOL/L (ref 23–29)
COLOR UR AUTO: ABNORMAL
COLOR UR AUTO: YELLOW
CREAT SERPL-MCNC: 1.3 MG/DL (ref 0.5–1.4)
CREAT SERPL-MCNC: 1.4 MG/DL (ref 0.5–1.4)
CREAT SERPL-MCNC: 1.5 MG/DL (ref 0.5–1.4)
CREAT SERPL-MCNC: 1.6 MG/DL (ref 0.5–1.4)
CREAT SERPL-MCNC: 1.6 MG/DL (ref 0.5–1.4)
CREAT SERPL-MCNC: 1.7 MG/DL (ref 0.5–1.4)
CREAT SERPL-MCNC: 1.8 MG/DL (ref 0.5–1.4)
CREAT SERPL-MCNC: 1.8 MG/DL (ref 0.5–1.4)
CREAT SERPL-MCNC: 1.9 MG/DL (ref 0.5–1.4)
CREAT SERPL-MCNC: 2 MG/DL (ref 0.5–1.4)
CROSSMATCH INTERPRETATION: NORMAL
CV ECHO LV RWT: 0.54 CM
CV ECHO LV RWT: 0.64 CM
DELSYS: ABNORMAL
DISPENSE STATUS: NORMAL
DOP CALC AO PEAK VEL: 0.9 M/S
DOP CALC AO PEAK VEL: 1 M/S
DOP CALC AO VTI: 11.6 CM
DOP CALC AO VTI: 16.2 CM
DOP CALC LVOT AREA: 3.1 CM2
DOP CALC LVOT AREA: 3.8 CM2
DOP CALC LVOT DIAMETER: 2 CM
DOP CALC LVOT DIAMETER: 2.2 CM
DOP CALC LVOT PEAK VEL: 0.7 M/S
DOP CALC LVOT PEAK VEL: 0.9 M/S
DOP CALC LVOT STROKE VOLUME: 38.4 CM3
DOP CALC LVOT STROKE VOLUME: 60.6 CM3
DOP CALCLVOT PEAK VEL VTI: 10.1 CM
DOP CALCLVOT PEAK VEL VTI: 19.3 CM
E WAVE DECELERATION TIME: 222 MS
E WAVE DECELERATION TIME: 243 MS
E WAVE DECELERATION TIME: 260 MS
E/A RATIO: 0.66
E/A RATIO: 0.94
E/E' RATIO: 5 M/S
E/E' RATIO: 9 M/S
EAG (OHS): 148 MG/DL (ref 68–131)
ECHO EF ESTIMATED: 67 %
ECHO EF ESTIMATED: 72 %
ECHO LV POSTERIOR WALL: 0.9 CM (ref 0.6–1.1)
ECHO LV POSTERIOR WALL: 1 CM (ref 0.6–1.1)
EJECTION FRACTION: 63 %
ERYTHROCYTE [DISTWIDTH] IN BLOOD BY AUTOMATED COUNT: 15.6 % (ref 11.5–14.5)
ERYTHROCYTE [DISTWIDTH] IN BLOOD BY AUTOMATED COUNT: 15.9 % (ref 11.5–14.5)
ERYTHROCYTE [DISTWIDTH] IN BLOOD BY AUTOMATED COUNT: 16 % (ref 11.5–14.5)
ERYTHROCYTE [DISTWIDTH] IN BLOOD BY AUTOMATED COUNT: 16.1 % (ref 11.5–14.5)
ERYTHROCYTE [DISTWIDTH] IN BLOOD BY AUTOMATED COUNT: 16.2 % (ref 11.5–14.5)
ERYTHROCYTE [DISTWIDTH] IN BLOOD BY AUTOMATED COUNT: 16.2 % (ref 11.5–14.5)
ERYTHROCYTE [DISTWIDTH] IN BLOOD BY AUTOMATED COUNT: 16.3 % (ref 11.5–14.5)
ERYTHROCYTE [DISTWIDTH] IN BLOOD BY AUTOMATED COUNT: 16.7 % (ref 11.5–14.5)
ERYTHROCYTE [SEDIMENTATION RATE] IN BLOOD BY WESTERGREN METHOD: 16 MM/H
ERYTHROCYTE [SEDIMENTATION RATE] IN BLOOD BY WESTERGREN METHOD: 18 MM/H
FIBRINOGEN PPP-MCNC: 108 MG/DL (ref 182–400)
FIBRINOGEN PPP-MCNC: 157 MG/DL (ref 182–400)
FIBRINOGEN PPP-MCNC: 220 MG/DL (ref 182–400)
FIBRINOGEN PPP-MCNC: 360 MG/DL (ref 182–400)
FIBRINOGEN PPP-MCNC: 405 MG/DL (ref 182–400)
FIBRINOGEN PPP-MCNC: 446 MG/DL (ref 182–400)
FIBRINOGEN PPP-MCNC: <70 MG/DL (ref 182–400)
FIBRINOGEN PPP-MCNC: <70 MG/DL (ref 182–400)
FIO2: 100
FIO2: 40
FIO2: 50
FRACTIONAL SHORTENING: 35.1 % (ref 28–44)
FRACTIONAL SHORTENING: 39.3 % (ref 28–44)
GFR SERPLBLD CREATININE-BSD FMLA CKD-EPI: 33 ML/MIN/1.73/M2
GFR SERPLBLD CREATININE-BSD FMLA CKD-EPI: 35 ML/MIN/1.73/M2
GFR SERPLBLD CREATININE-BSD FMLA CKD-EPI: 38 ML/MIN/1.73/M2
GFR SERPLBLD CREATININE-BSD FMLA CKD-EPI: 38 ML/MIN/1.73/M2
GFR SERPLBLD CREATININE-BSD FMLA CKD-EPI: 40 ML/MIN/1.73/M2
GFR SERPLBLD CREATININE-BSD FMLA CKD-EPI: 43 ML/MIN/1.73/M2
GFR SERPLBLD CREATININE-BSD FMLA CKD-EPI: 43 ML/MIN/1.73/M2
GFR SERPLBLD CREATININE-BSD FMLA CKD-EPI: 47 ML/MIN/1.73/M2
GFR SERPLBLD CREATININE-BSD FMLA CKD-EPI: 51 ML/MIN/1.73/M2
GFR SERPLBLD CREATININE-BSD FMLA CKD-EPI: 56 ML/MIN/1.73/M2
GLUCOSE SERPL-MCNC: 108 MG/DL (ref 70–110)
GLUCOSE SERPL-MCNC: 111 MG/DL (ref 70–110)
GLUCOSE SERPL-MCNC: 113 MG/DL (ref 70–110)
GLUCOSE SERPL-MCNC: 116 MG/DL (ref 70–110)
GLUCOSE SERPL-MCNC: 121 MG/DL (ref 70–110)
GLUCOSE SERPL-MCNC: 150 MG/DL (ref 70–110)
GLUCOSE SERPL-MCNC: 164 MG/DL (ref 70–110)
GLUCOSE SERPL-MCNC: 172 MG/DL (ref 70–110)
GLUCOSE SERPL-MCNC: 179 MG/DL (ref 70–110)
GLUCOSE SERPL-MCNC: 184 MG/DL (ref 70–110)
GLUCOSE SERPL-MCNC: 187 MG/DL (ref 70–110)
GLUCOSE SERPL-MCNC: 189 MG/DL (ref 70–110)
GLUCOSE SERPL-MCNC: 192 MG/DL (ref 70–110)
GLUCOSE SERPL-MCNC: 198 MG/DL (ref 70–110)
GLUCOSE SERPL-MCNC: 202 MG/DL (ref 70–110)
GLUCOSE SERPL-MCNC: 211 MG/DL (ref 70–110)
GLUCOSE SERPL-MCNC: 239 MG/DL (ref 70–110)
GLUCOSE SERPL-MCNC: 281 MG/DL (ref 70–110)
GLUCOSE SERPL-MCNC: 295 MG/DL (ref 70–110)
GLUCOSE UR QL STRIP: ABNORMAL
GLUCOSE UR QL STRIP: NEGATIVE
GRAM STN SPEC: NORMAL
HBA1C MFR BLD: 6.8 % (ref 4–5.6)
HCO3 UR-SCNC: 16.8 MMOL/L (ref 24–28)
HCO3 UR-SCNC: 18 MMOL/L (ref 24–28)
HCO3 UR-SCNC: 18.3 MMOL/L (ref 24–28)
HCO3 UR-SCNC: 18.4 MMOL/L (ref 24–28)
HCO3 UR-SCNC: 20.2 MMOL/L (ref 24–28)
HCO3 UR-SCNC: 20.2 MMOL/L (ref 24–28)
HCO3 UR-SCNC: 23 MMOL/L (ref 24–28)
HCT VFR BLD AUTO: 19.7 % (ref 40–54)
HCT VFR BLD AUTO: 19.9 % (ref 40–54)
HCT VFR BLD AUTO: 20.7 % (ref 40–54)
HCT VFR BLD AUTO: 20.8 % (ref 40–54)
HCT VFR BLD AUTO: 21.9 % (ref 40–54)
HCT VFR BLD AUTO: 22.1 % (ref 40–54)
HCT VFR BLD AUTO: 22.5 % (ref 40–54)
HCT VFR BLD AUTO: 22.6 % (ref 40–54)
HCT VFR BLD AUTO: 22.8 % (ref 40–54)
HCT VFR BLD AUTO: 23.2 % (ref 40–54)
HCT VFR BLD AUTO: 23.2 % (ref 40–54)
HCT VFR BLD AUTO: 24.5 % (ref 40–54)
HCT VFR BLD AUTO: 24.8 % (ref 40–54)
HCT VFR BLD AUTO: 25.2 % (ref 40–54)
HCT VFR BLD AUTO: 25.8 % (ref 40–54)
HCT VFR BLD AUTO: 26 % (ref 40–54)
HCT VFR BLD AUTO: 27 % (ref 40–54)
HCT VFR BLD AUTO: 28.3 % (ref 40–54)
HCT VFR BLD CALC: 19 %PCV (ref 36–54)
HCT VFR BLD CALC: 21 %PCV (ref 36–54)
HCT VFR BLD CALC: 24 %PCV (ref 36–54)
HCT VFR BLD CALC: 28 %PCV (ref 36–54)
HDLC SERPL-MCNC: 32 MG/DL (ref 40–75)
HDLC SERPL: 28.8 % (ref 20–50)
HGB BLD-MCNC: 6.2 GM/DL (ref 14–18)
HGB BLD-MCNC: 6.3 GM/DL (ref 14–18)
HGB BLD-MCNC: 6.7 GM/DL (ref 14–18)
HGB BLD-MCNC: 6.7 GM/DL (ref 14–18)
HGB BLD-MCNC: 7.1 GM/DL (ref 14–18)
HGB BLD-MCNC: 7.1 GM/DL (ref 14–18)
HGB BLD-MCNC: 7.3 GM/DL (ref 14–18)
HGB BLD-MCNC: 7.5 GM/DL (ref 14–18)
HGB BLD-MCNC: 7.6 GM/DL (ref 14–18)
HGB BLD-MCNC: 7.7 GM/DL (ref 14–18)
HGB BLD-MCNC: 7.9 GM/DL (ref 14–18)
HGB BLD-MCNC: 8.2 GM/DL (ref 14–18)
HGB BLD-MCNC: 8.2 GM/DL (ref 14–18)
HGB BLD-MCNC: 8.3 GM/DL (ref 14–18)
HGB BLD-MCNC: 8.6 GM/DL (ref 14–18)
HGB BLD-MCNC: 9 GM/DL (ref 14–18)
HGB UR QL STRIP: ABNORMAL
HGB UR QL STRIP: NEGATIVE
HOLD SPECIMEN: NORMAL
HOLD SPECIMEN: NORMAL
HYALINE CASTS UR QL AUTO: 0 /LPF (ref 0–1)
IMM GRANULOCYTES # BLD AUTO: 0.03 K/UL (ref 0–0.04)
IMM GRANULOCYTES # BLD AUTO: 0.04 K/UL (ref 0–0.04)
IMM GRANULOCYTES # BLD AUTO: 0.05 K/UL (ref 0–0.04)
IMM GRANULOCYTES # BLD AUTO: 0.06 K/UL (ref 0–0.04)
IMM GRANULOCYTES # BLD AUTO: 0.07 K/UL (ref 0–0.04)
IMM GRANULOCYTES # BLD AUTO: 0.08 K/UL (ref 0–0.04)
IMM GRANULOCYTES # BLD AUTO: 0.1 K/UL (ref 0–0.04)
IMM GRANULOCYTES # BLD AUTO: 0.2 K/UL (ref 0–0.04)
IMM GRANULOCYTES NFR BLD AUTO: 0.4 % (ref 0–0.5)
IMM GRANULOCYTES NFR BLD AUTO: 0.6 % (ref 0–0.5)
IMM GRANULOCYTES NFR BLD AUTO: 0.7 % (ref 0–0.5)
IMM GRANULOCYTES NFR BLD AUTO: 0.7 % (ref 0–0.5)
IMM GRANULOCYTES NFR BLD AUTO: 0.8 % (ref 0–0.5)
IMM GRANULOCYTES NFR BLD AUTO: 0.8 % (ref 0–0.5)
IMM GRANULOCYTES NFR BLD AUTO: 0.9 % (ref 0–0.5)
IMM GRANULOCYTES NFR BLD AUTO: 1 % (ref 0–0.5)
IMM GRANULOCYTES NFR BLD AUTO: 1 % (ref 0–0.5)
IMM GRANULOCYTES NFR BLD AUTO: 1.1 % (ref 0–0.5)
IMM GRANULOCYTES NFR BLD AUTO: 1.3 % (ref 0–0.5)
IMM GRANULOCYTES NFR BLD AUTO: 2.2 % (ref 0–0.5)
INDIRECT COOMBS: NORMAL
INR PPP: 1 (ref 0.8–1.2)
INR PPP: 1.1 (ref 0.8–1.2)
INR PPP: 1.3 (ref 0.8–1.2)
INR PPP: 1.6 (ref 0.8–1.2)
INR PPP: 2 (ref 0.8–1.2)
INR PPP: 2.3 (ref 0.8–1.2)
INR PPP: 2.4 (ref 0.8–1.2)
INR PPP: 4.7 (ref 0.8–1.2)
INR PPP: 5.7 (ref 0.8–1.2)
INTERVENTRICULAR SEPTUM: 0.9 CM (ref 0.6–1.1)
INTERVENTRICULAR SEPTUM: 0.9 CM (ref 0.6–1.1)
IVRT: 88 MS
KETONES UR QL STRIP: ABNORMAL
KETONES UR QL STRIP: NEGATIVE
LA MAJOR: 3.3 CM
LA MINOR: 2.8 CM
LA WIDTH: 2.8 CM
LABORATORY COMMENT REPORT: NORMAL
LABORATORY COMMENT REPORT: NORMAL
LACTATE SERPL-SCNC: 1.2 MMOL/L (ref 0.5–2.2)
LACTATE SERPL-SCNC: 1.5 MMOL/L (ref 0.5–2.2)
LACTATE SERPL-SCNC: 1.5 MMOL/L (ref 0.5–2.2)
LACTATE SERPL-SCNC: 3.8 MMOL/L (ref 0.5–2.2)
LDH SERPL L TO P-CCNC: 3.71 MMOL/L (ref 0.5–2.2)
LDLC SERPL CALC-MCNC: 54.2 MG/DL (ref 63–159)
LEFT ATRIUM SIZE: 2 CM
LEFT ATRIUM SIZE: 3.2 CM
LEFT ATRIUM VOLUME INDEX: 10 ML/M2
LEFT ATRIUM VOLUME: 23 CM3
LEFT INTERNAL DIMENSION IN SYSTOLE: 1.7 CM (ref 2.1–4)
LEFT INTERNAL DIMENSION IN SYSTOLE: 2.4 CM (ref 2.1–4)
LEFT VENTRICLE DIASTOLIC VOLUME INDEX: 13.72 ML/M2
LEFT VENTRICLE DIASTOLIC VOLUME INDEX: 25.66 ML/M2
LEFT VENTRICLE DIASTOLIC VOLUME: 31 ML
LEFT VENTRICLE DIASTOLIC VOLUME: 58 ML
LEFT VENTRICLE MASS INDEX: 28 G/M2
LEFT VENTRICLE MASS INDEX: 46.3 G/M2
LEFT VENTRICLE SYSTOLIC VOLUME INDEX: 4 ML/M2
LEFT VENTRICLE SYSTOLIC VOLUME INDEX: 8.4 ML/M2
LEFT VENTRICLE SYSTOLIC VOLUME: 19 ML
LEFT VENTRICLE SYSTOLIC VOLUME: 9 ML
LEFT VENTRICULAR INTERNAL DIMENSION IN DIASTOLE: 2.8 CM (ref 3.5–6)
LEFT VENTRICULAR INTERNAL DIMENSION IN DIASTOLE: 3.7 CM (ref 3.5–6)
LEFT VENTRICULAR MASS: 104.6 G
LEFT VENTRICULAR MASS: 63.3 G
LEUKOCYTE ESTERASE UR QL STRIP: NEGATIVE
LEUKOCYTE ESTERASE UR QL STRIP: NEGATIVE
LIPASE SERPL-CCNC: 25 U/L (ref 4–60)
LV LATERAL E/E' RATIO: 10.8
LV LATERAL E/E' RATIO: 4.9
LV SEPTAL E/E' RATIO: 6.1
LV SEPTAL E/E' RATIO: 7.2
LYMPHOCYTES # BLD AUTO: 0.45 K/UL (ref 1–4.8)
LYMPHOCYTES # BLD AUTO: 0.66 K/UL (ref 1–4.8)
LYMPHOCYTES # BLD AUTO: 0.68 K/UL (ref 1–4.8)
LYMPHOCYTES # BLD AUTO: 0.72 K/UL (ref 1–4.8)
LYMPHOCYTES # BLD AUTO: 0.73 K/UL (ref 1–4.8)
LYMPHOCYTES # BLD AUTO: 0.74 K/UL (ref 1–4.8)
LYMPHOCYTES # BLD AUTO: 0.74 K/UL (ref 1–4.8)
LYMPHOCYTES # BLD AUTO: 0.76 K/UL (ref 1–4.8)
LYMPHOCYTES # BLD AUTO: 0.87 K/UL (ref 1–4.8)
LYMPHOCYTES # BLD AUTO: 0.9 K/UL (ref 1–4.8)
LYMPHOCYTES # BLD AUTO: 0.93 K/UL (ref 1–4.8)
LYMPHOCYTES # BLD AUTO: 0.96 K/UL (ref 1–4.8)
LYMPHOCYTES # BLD AUTO: 0.96 K/UL (ref 1–4.8)
LYMPHOCYTES # BLD AUTO: 1.04 K/UL (ref 1–4.8)
LYMPHOCYTES # BLD AUTO: 1.07 K/UL (ref 1–4.8)
LYMPHOCYTES # BLD AUTO: 2.25 K/UL (ref 1–4.8)
MAGNESIUM SERPL-MCNC: 2.2 MG/DL (ref 1.6–2.6)
MAGNESIUM SERPL-MCNC: 2.3 MG/DL (ref 1.6–2.6)
MAGNESIUM SERPL-MCNC: 2.5 MG/DL (ref 1.6–2.6)
MAGNESIUM SERPL-MCNC: 2.5 MG/DL (ref 1.6–2.6)
MCH RBC QN AUTO: 26.8 PG (ref 27–31)
MCH RBC QN AUTO: 27.1 PG (ref 27–31)
MCH RBC QN AUTO: 27.5 PG (ref 27–31)
MCH RBC QN AUTO: 27.7 PG (ref 27–31)
MCH RBC QN AUTO: 27.8 PG (ref 27–31)
MCH RBC QN AUTO: 27.9 PG (ref 27–31)
MCH RBC QN AUTO: 28 PG (ref 27–31)
MCH RBC QN AUTO: 28.1 PG (ref 27–31)
MCH RBC QN AUTO: 28.1 PG (ref 27–31)
MCH RBC QN AUTO: 28.2 PG (ref 27–31)
MCH RBC QN AUTO: 28.4 PG (ref 27–31)
MCH RBC QN AUTO: 28.6 PG (ref 27–31)
MCH RBC QN AUTO: 28.8 PG (ref 27–31)
MCH RBC QN AUTO: 28.8 PG (ref 27–31)
MCH RBC QN AUTO: 29.1 PG (ref 27–31)
MCHC RBC AUTO-ENTMCNC: 29.8 G/DL (ref 32–36)
MCHC RBC AUTO-ENTMCNC: 31 G/DL (ref 32–36)
MCHC RBC AUTO-ENTMCNC: 31.4 G/DL (ref 32–36)
MCHC RBC AUTO-ENTMCNC: 31.4 G/DL (ref 32–36)
MCHC RBC AUTO-ENTMCNC: 31.5 G/DL (ref 32–36)
MCHC RBC AUTO-ENTMCNC: 31.8 G/DL (ref 32–36)
MCHC RBC AUTO-ENTMCNC: 31.8 G/DL (ref 32–36)
MCHC RBC AUTO-ENTMCNC: 31.9 G/DL (ref 32–36)
MCHC RBC AUTO-ENTMCNC: 32 G/DL (ref 32–36)
MCHC RBC AUTO-ENTMCNC: 32.1 G/DL (ref 32–36)
MCHC RBC AUTO-ENTMCNC: 32.3 G/DL (ref 32–36)
MCHC RBC AUTO-ENTMCNC: 32.4 G/DL (ref 32–36)
MCHC RBC AUTO-ENTMCNC: 32.9 G/DL (ref 32–36)
MCHC RBC AUTO-ENTMCNC: 33.3 G/DL (ref 32–36)
MCHC RBC AUTO-ENTMCNC: 33.7 G/DL (ref 32–36)
MCHC RBC AUTO-ENTMCNC: 33.8 G/DL (ref 32–36)
MCHC RBC AUTO-ENTMCNC: 33.8 G/DL (ref 32–36)
MCHC RBC AUTO-ENTMCNC: 34.1 G/DL (ref 32–36)
MCV RBC AUTO: 83 FL (ref 82–98)
MCV RBC AUTO: 83 FL (ref 82–98)
MCV RBC AUTO: 84 FL (ref 82–98)
MCV RBC AUTO: 84 FL (ref 82–98)
MCV RBC AUTO: 85 FL (ref 82–98)
MCV RBC AUTO: 86 FL (ref 82–98)
MCV RBC AUTO: 86 FL (ref 82–98)
MCV RBC AUTO: 87 FL (ref 82–98)
MCV RBC AUTO: 88 FL (ref 82–98)
MCV RBC AUTO: 89 FL (ref 82–98)
MCV RBC AUTO: 91 FL (ref 82–98)
MICROSCOPIC COMMENT: ABNORMAL
MODE: ABNORMAL
MRSA PCR SCRN (OHS): NOT DETECTED
MV PEAK A VEL: 0.69 M/S
MV PEAK A VEL: 0.74 M/S
MV PEAK E VEL: 0.49 M/S
MV PEAK E VEL: 0.65 M/S
NITRITE UR QL STRIP: NEGATIVE
NITRITE UR QL STRIP: NEGATIVE
NONHDLC SERPL-MCNC: 79 MG/DL
NUCLEATED RBC (/100WBC) (OHS): 0 /100 WBC
NUCLEATED RBC (/100WBC) (OHS): 1 /100 WBC
OHS CV RV/LV RATIO: 0.59 CM
OHS QRS DURATION: 140 MS
OHS QRS DURATION: 80 MS
OHS QRS DURATION: 90 MS
OHS QRS DURATION: 90 MS
OHS QTC CALCULATION: 453 MS
OHS QTC CALCULATION: 462 MS
OHS QTC CALCULATION: 482 MS
OHS QTC CALCULATION: 551 MS
PCO2 BLDA: 31 MMHG (ref 35–45)
PCO2 BLDA: 31.2 MMHG (ref 35–45)
PCO2 BLDA: 31.5 MMHG (ref 35–45)
PCO2 BLDA: 35.1 MMHG (ref 35–45)
PCO2 BLDA: 36.2 MMHG (ref 35–45)
PCO2 BLDA: 37.1 MMHG (ref 35–45)
PCO2 BLDA: 66.7 MMHG (ref 35–45)
PEEP: 10
PEEP: 5
PEEP: 5
PEEP: 8
PH SMN: 7.01 [PH] (ref 7.35–7.45)
PH SMN: 7.29 [PH] (ref 7.35–7.45)
PH SMN: 7.33 [PH] (ref 7.35–7.45)
PH SMN: 7.37 [PH] (ref 7.35–7.45)
PH SMN: 7.41 [PH] (ref 7.35–7.45)
PH SMN: 7.42 [PH] (ref 7.35–7.45)
PH SMN: 7.42 [PH] (ref 7.35–7.45)
PH UR STRIP: 6 [PH]
PH UR STRIP: 6 [PH]
PHOSPHATE SERPL-MCNC: 2.8 MG/DL (ref 2.7–4.5)
PHOSPHATE SERPL-MCNC: 2.9 MG/DL (ref 2.7–4.5)
PHOSPHATE SERPL-MCNC: 3.5 MG/DL (ref 2.7–4.5)
PHOSPHATE SERPL-MCNC: 3.6 MG/DL (ref 2.7–4.5)
PHOSPHATE SERPL-MCNC: 4 MG/DL (ref 2.7–4.5)
PHOSPHATE SERPL-MCNC: 4.1 MG/DL (ref 2.7–4.5)
PHOSPHATE SERPL-MCNC: 4.4 MG/DL (ref 2.7–4.5)
PHOSPHATE SERPL-MCNC: 4.4 MG/DL (ref 2.7–4.5)
PHOSPHATE SERPL-MCNC: 4.5 MG/DL (ref 2.7–4.5)
PHOSPHATE SERPL-MCNC: 4.9 MG/DL (ref 2.7–4.5)
PIP: 10
PISA TR MAX VEL: 2.7 M/S
PLATELET # BLD AUTO: 109 K/UL (ref 150–450)
PLATELET # BLD AUTO: 112 K/UL (ref 150–450)
PLATELET # BLD AUTO: 114 K/UL (ref 150–450)
PLATELET # BLD AUTO: 128 K/UL (ref 150–450)
PLATELET # BLD AUTO: 133 K/UL (ref 150–450)
PLATELET # BLD AUTO: 139 K/UL (ref 150–450)
PLATELET # BLD AUTO: 142 K/UL (ref 150–450)
PLATELET # BLD AUTO: 143 K/UL (ref 150–450)
PLATELET # BLD AUTO: 143 K/UL (ref 150–450)
PLATELET # BLD AUTO: 144 K/UL (ref 150–450)
PLATELET # BLD AUTO: 148 K/UL (ref 150–450)
PLATELET # BLD AUTO: 150 K/UL (ref 150–450)
PLATELET # BLD AUTO: 151 K/UL (ref 150–450)
PLATELET # BLD AUTO: 72 K/UL (ref 150–450)
PLATELET # BLD AUTO: 80 K/UL (ref 150–450)
PLATELET # BLD AUTO: 82 K/UL (ref 150–450)
PLATELET # BLD AUTO: 90 K/UL (ref 150–450)
PLATELET # BLD AUTO: 97 K/UL (ref 150–450)
PLATELET BLD QL SMEAR: ABNORMAL
PMV BLD AUTO: 10.1 FL (ref 9.2–12.9)
PMV BLD AUTO: 10.2 FL (ref 9.2–12.9)
PMV BLD AUTO: 10.4 FL (ref 9.2–12.9)
PMV BLD AUTO: 10.4 FL (ref 9.2–12.9)
PMV BLD AUTO: 10.6 FL (ref 9.2–12.9)
PMV BLD AUTO: 10.6 FL (ref 9.2–12.9)
PMV BLD AUTO: 10.8 FL (ref 9.2–12.9)
PMV BLD AUTO: 10.8 FL (ref 9.2–12.9)
PMV BLD AUTO: 10.9 FL (ref 9.2–12.9)
PMV BLD AUTO: 11.1 FL (ref 9.2–12.9)
PMV BLD AUTO: 11.2 FL (ref 9.2–12.9)
PMV BLD AUTO: 12.3 FL (ref 9.2–12.9)
PMV BLD AUTO: 9.4 FL (ref 9.2–12.9)
PMV BLD AUTO: 9.8 FL (ref 9.2–12.9)
PO2 BLDA: 103 MMHG (ref 80–100)
PO2 BLDA: 105 MMHG (ref 80–100)
PO2 BLDA: 134 MMHG (ref 80–100)
PO2 BLDA: 140 MMHG (ref 80–100)
PO2 BLDA: 380 MMHG (ref 80–100)
PO2 BLDA: 88 MMHG (ref 80–100)
PO2 BLDA: 95 MMHG (ref 80–100)
POC BE: -14 MMOL/L (ref -2–2)
POC BE: -2 MMOL/L (ref -2–2)
POC BE: -4 MMOL/L (ref -2–2)
POC BE: -4 MMOL/L (ref -2–2)
POC BE: -7 MMOL/L (ref -2–2)
POC BE: -8 MMOL/L (ref -2–2)
POC BE: -9 MMOL/L (ref -2–2)
POC IONIZED CALCIUM: 1.11 MMOL/L (ref 1.06–1.42)
POC IONIZED CALCIUM: 1.15 MMOL/L (ref 1.06–1.42)
POC IONIZED CALCIUM: 1.15 MMOL/L (ref 1.06–1.42)
POC IONIZED CALCIUM: 1.22 MMOL/L (ref 1.06–1.42)
POC SATURATED O2: 100 % (ref 95–100)
POC SATURATED O2: 97 % (ref 95–100)
POC SATURATED O2: 98 % (ref 95–100)
POC SATURATED O2: 98 % (ref 95–100)
POC SATURATED O2: 99 % (ref 95–100)
POC TCO2 (MEASURED): 22 MMOL/L (ref 23–29)
POC TCO2: 19 MMOL/L (ref 23–27)
POC TCO2: 21 MMOL/L (ref 23–27)
POC TCO2: 21 MMOL/L (ref 23–27)
POC TCO2: 24 MMOL/L (ref 23–27)
POCT GLUCOSE: 112 MG/DL (ref 70–110)
POCT GLUCOSE: 116 MG/DL (ref 70–110)
POCT GLUCOSE: 119 MG/DL (ref 70–110)
POCT GLUCOSE: 123 MG/DL (ref 70–110)
POCT GLUCOSE: 125 MG/DL (ref 70–110)
POCT GLUCOSE: 125 MG/DL (ref 70–110)
POCT GLUCOSE: 128 MG/DL (ref 70–110)
POCT GLUCOSE: 132 MG/DL (ref 70–110)
POCT GLUCOSE: 133 MG/DL (ref 70–110)
POCT GLUCOSE: 134 MG/DL (ref 70–110)
POCT GLUCOSE: 150 MG/DL (ref 70–110)
POCT GLUCOSE: 151 MG/DL (ref 70–110)
POCT GLUCOSE: 153 MG/DL (ref 70–110)
POCT GLUCOSE: 156 MG/DL (ref 70–110)
POCT GLUCOSE: 165 MG/DL (ref 70–110)
POCT GLUCOSE: 165 MG/DL (ref 70–110)
POCT GLUCOSE: 173 MG/DL (ref 70–110)
POCT GLUCOSE: 179 MG/DL (ref 70–110)
POCT GLUCOSE: 181 MG/DL (ref 70–110)
POCT GLUCOSE: 181 MG/DL (ref 70–110)
POCT GLUCOSE: 182 MG/DL (ref 70–110)
POCT GLUCOSE: 183 MG/DL (ref 70–110)
POCT GLUCOSE: 184 MG/DL (ref 70–110)
POCT GLUCOSE: 187 MG/DL (ref 70–110)
POCT GLUCOSE: 189 MG/DL (ref 70–110)
POCT GLUCOSE: 193 MG/DL (ref 70–110)
POCT GLUCOSE: 203 MG/DL (ref 70–110)
POCT GLUCOSE: 210 MG/DL (ref 70–110)
POCT GLUCOSE: 214 MG/DL (ref 70–110)
POCT GLUCOSE: 265 MG/DL (ref 70–110)
POCT GLUCOSE: 278 MG/DL (ref 70–110)
POCT GLUCOSE: 283 MG/DL (ref 70–110)
POCT GLUCOSE: 328 MG/DL (ref 70–110)
POTASSIUM BLD-SCNC: 3.9 MMOL/L (ref 3.5–5.1)
POTASSIUM BLD-SCNC: 4.1 MMOL/L (ref 3.5–5.1)
POTASSIUM BLD-SCNC: 4.7 MMOL/L (ref 3.5–5.1)
POTASSIUM BLD-SCNC: 5.1 MMOL/L (ref 3.5–5.1)
POTASSIUM SERPL-SCNC: 4 MMOL/L (ref 3.5–5.1)
POTASSIUM SERPL-SCNC: 4.1 MMOL/L (ref 3.5–5.1)
POTASSIUM SERPL-SCNC: 4.2 MMOL/L (ref 3.5–5.1)
POTASSIUM SERPL-SCNC: 4.3 MMOL/L (ref 3.5–5.1)
POTASSIUM SERPL-SCNC: 4.4 MMOL/L (ref 3.5–5.1)
POTASSIUM SERPL-SCNC: 4.6 MMOL/L (ref 3.5–5.1)
POTASSIUM SERPL-SCNC: 4.7 MMOL/L (ref 3.5–5.1)
POTASSIUM SERPL-SCNC: 4.7 MMOL/L (ref 3.5–5.1)
POTASSIUM SERPL-SCNC: 5 MMOL/L (ref 3.5–5.1)
POTASSIUM SERPL-SCNC: 5.1 MMOL/L (ref 3.5–5.1)
POTASSIUM SERPL-SCNC: 5.2 MMOL/L (ref 3.5–5.1)
POTASSIUM SERPL-SCNC: 6 MMOL/L (ref 3.5–5.1)
PROT SERPL-MCNC: 5 GM/DL (ref 6–8.4)
PROT SERPL-MCNC: 5.1 GM/DL (ref 6–8.4)
PROT SERPL-MCNC: 5.2 GM/DL (ref 6–8.4)
PROT SERPL-MCNC: 5.3 GM/DL (ref 6–8.4)
PROT SERPL-MCNC: 5.3 GM/DL (ref 6–8.4)
PROT SERPL-MCNC: 5.4 GM/DL (ref 6–8.4)
PROT SERPL-MCNC: 6.3 GM/DL (ref 6–8.4)
PROT SERPL-MCNC: 6.3 GM/DL (ref 6–8.4)
PROT SERPL-MCNC: 6.4 GM/DL (ref 6–8.4)
PROT SERPL-MCNC: 6.5 GM/DL (ref 6–8.4)
PROT SERPL-MCNC: 6.5 GM/DL (ref 6–8.4)
PROT SERPL-MCNC: 7.1 GM/DL (ref 6–8.4)
PROT SERPL-MCNC: 7.5 GM/DL (ref 6–8.4)
PROT UR QL STRIP: ABNORMAL
PROT UR QL STRIP: ABNORMAL
PROTHROMBIN TIME: 11.4 SECONDS (ref 9–12.5)
PROTHROMBIN TIME: 11.9 SECONDS (ref 9–12.5)
PROTHROMBIN TIME: 13.8 SECONDS (ref 9–12.5)
PROTHROMBIN TIME: 14 SECONDS (ref 9–12.5)
PROTHROMBIN TIME: 14.2 SECONDS (ref 9–12.5)
PROTHROMBIN TIME: 17.1 SECONDS (ref 9–12.5)
PROTHROMBIN TIME: 20.4 SECONDS (ref 9–12.5)
PROTHROMBIN TIME: 23.5 SECONDS (ref 9–12.5)
PROTHROMBIN TIME: 24.5 SECONDS (ref 9–12.5)
PROTHROMBIN TIME: 46.4 SECONDS (ref 9–12.5)
PROTHROMBIN TIME: 54.9 SECONDS (ref 9–12.5)
RA MAJOR: 2.31 CM
RA PRESSURE ESTIMATED: 3 MMHG
RA WIDTH: 2.98 CM
RBC # BLD AUTO: 2.29 M/UL (ref 4.6–6.2)
RBC # BLD AUTO: 2.33 M/UL (ref 4.6–6.2)
RBC # BLD AUTO: 2.35 M/UL (ref 4.6–6.2)
RBC # BLD AUTO: 2.4 M/UL (ref 4.6–6.2)
RBC # BLD AUTO: 2.52 M/UL (ref 4.6–6.2)
RBC # BLD AUTO: 2.54 M/UL (ref 4.6–6.2)
RBC # BLD AUTO: 2.55 M/UL (ref 4.6–6.2)
RBC # BLD AUTO: 2.6 M/UL (ref 4.6–6.2)
RBC # BLD AUTO: 2.61 M/UL (ref 4.6–6.2)
RBC # BLD AUTO: 2.74 M/UL (ref 4.6–6.2)
RBC # BLD AUTO: 2.77 M/UL (ref 4.6–6.2)
RBC # BLD AUTO: 2.78 M/UL (ref 4.6–6.2)
RBC # BLD AUTO: 2.8 M/UL (ref 4.6–6.2)
RBC # BLD AUTO: 2.94 M/UL (ref 4.6–6.2)
RBC # BLD AUTO: 2.94 M/UL (ref 4.6–6.2)
RBC # BLD AUTO: 2.95 M/UL (ref 4.6–6.2)
RBC # BLD AUTO: 3.1 M/UL (ref 4.6–6.2)
RBC # BLD AUTO: 3.23 M/UL (ref 4.6–6.2)
RBC #/AREA URNS AUTO: >100 /HPF (ref 0–4)
RBCS: NORMAL
RELATIVE EOSINOPHIL (OHS): 0 %
RELATIVE EOSINOPHIL (OHS): 0.1 %
RELATIVE EOSINOPHIL (OHS): 0.5 %
RELATIVE EOSINOPHIL (OHS): 1 %
RELATIVE EOSINOPHIL (OHS): 1.3 %
RELATIVE EOSINOPHIL (OHS): 1.7 %
RELATIVE EOSINOPHIL (OHS): 1.7 %
RELATIVE EOSINOPHIL (OHS): 1.9 %
RELATIVE EOSINOPHIL (OHS): 2 %
RELATIVE EOSINOPHIL (OHS): 2.2 %
RELATIVE EOSINOPHIL (OHS): 2.3 %
RELATIVE EOSINOPHIL (OHS): 2.4 %
RELATIVE EOSINOPHIL (OHS): 2.4 %
RELATIVE EOSINOPHIL (OHS): 2.5 %
RELATIVE EOSINOPHIL (OHS): 2.6 %
RELATIVE EOSINOPHIL (OHS): 2.8 %
RELATIVE LYMPHOCYTE (OHS): 10.2 % (ref 18–48)
RELATIVE LYMPHOCYTE (OHS): 11 % (ref 18–48)
RELATIVE LYMPHOCYTE (OHS): 11.1 % (ref 18–48)
RELATIVE LYMPHOCYTE (OHS): 11.2 % (ref 18–48)
RELATIVE LYMPHOCYTE (OHS): 11.2 % (ref 18–48)
RELATIVE LYMPHOCYTE (OHS): 12 % (ref 18–48)
RELATIVE LYMPHOCYTE (OHS): 12.1 % (ref 18–48)
RELATIVE LYMPHOCYTE (OHS): 13.5 % (ref 18–48)
RELATIVE LYMPHOCYTE (OHS): 13.9 % (ref 18–48)
RELATIVE LYMPHOCYTE (OHS): 14.7 % (ref 18–48)
RELATIVE LYMPHOCYTE (OHS): 15 % (ref 18–48)
RELATIVE LYMPHOCYTE (OHS): 24.3 % (ref 18–48)
RELATIVE LYMPHOCYTE (OHS): 5.1 % (ref 18–48)
RELATIVE LYMPHOCYTE (OHS): 8.4 % (ref 18–48)
RELATIVE LYMPHOCYTE (OHS): 8.8 % (ref 18–48)
RELATIVE LYMPHOCYTE (OHS): 9.1 % (ref 18–48)
RELATIVE MONOCYTE (OHS): 10.5 % (ref 4–15)
RELATIVE MONOCYTE (OHS): 10.6 % (ref 4–15)
RELATIVE MONOCYTE (OHS): 10.6 % (ref 4–15)
RELATIVE MONOCYTE (OHS): 11.6 % (ref 4–15)
RELATIVE MONOCYTE (OHS): 12.2 % (ref 4–15)
RELATIVE MONOCYTE (OHS): 12.3 % (ref 4–15)
RELATIVE MONOCYTE (OHS): 12.8 % (ref 4–15)
RELATIVE MONOCYTE (OHS): 13 % (ref 4–15)
RELATIVE MONOCYTE (OHS): 5.8 % (ref 4–15)
RELATIVE MONOCYTE (OHS): 6.8 % (ref 4–15)
RELATIVE MONOCYTE (OHS): 7.7 % (ref 4–15)
RELATIVE MONOCYTE (OHS): 8 % (ref 4–15)
RELATIVE MONOCYTE (OHS): 8.1 % (ref 4–15)
RELATIVE MONOCYTE (OHS): 8.5 % (ref 4–15)
RELATIVE MONOCYTE (OHS): 8.5 % (ref 4–15)
RELATIVE MONOCYTE (OHS): 8.9 % (ref 4–15)
RELATIVE MONOCYTE (OHS): 9.5 % (ref 4–15)
RELATIVE MONOCYTE (OHS): 9.6 % (ref 4–15)
RELATIVE NEUTROPHIL (OHS): 66.5 % (ref 38–73)
RELATIVE NEUTROPHIL (OHS): 69.8 % (ref 38–73)
RELATIVE NEUTROPHIL (OHS): 70 % (ref 38–73)
RELATIVE NEUTROPHIL (OHS): 70.5 % (ref 38–73)
RELATIVE NEUTROPHIL (OHS): 71.2 % (ref 38–73)
RELATIVE NEUTROPHIL (OHS): 73.1 % (ref 38–73)
RELATIVE NEUTROPHIL (OHS): 74.3 % (ref 38–73)
RELATIVE NEUTROPHIL (OHS): 74.5 % (ref 38–73)
RELATIVE NEUTROPHIL (OHS): 74.8 % (ref 38–73)
RELATIVE NEUTROPHIL (OHS): 75.9 % (ref 38–73)
RELATIVE NEUTROPHIL (OHS): 76.8 % (ref 38–73)
RELATIVE NEUTROPHIL (OHS): 77.1 % (ref 38–73)
RELATIVE NEUTROPHIL (OHS): 77.8 % (ref 38–73)
RELATIVE NEUTROPHIL (OHS): 78.7 % (ref 38–73)
RELATIVE NEUTROPHIL (OHS): 79.8 % (ref 38–73)
RELATIVE NEUTROPHIL (OHS): 80.7 % (ref 38–73)
RELATIVE NEUTROPHIL (OHS): 82.3 % (ref 38–73)
RELATIVE NEUTROPHIL (OHS): 85.7 % (ref 38–73)
RH BLD: NORMAL
RIGHT VENTRICLE DIASTOLIC BASEL DIMENSION: 2.2 CM
RV TISSUE DOPPLER FREE WALL SYSTOLIC VELOCITY 1 (APICAL 4 CHAMBER VIEW): 14.35 CM/S
SAMPLE: ABNORMAL
SINUS: 3.1 CM
SINUS: 3.22 CM
SITE: ABNORMAL
SODIUM BLD-SCNC: 135 MMOL/L (ref 136–145)
SODIUM BLD-SCNC: 135 MMOL/L (ref 136–145)
SODIUM BLD-SCNC: 137 MMOL/L (ref 136–145)
SODIUM BLD-SCNC: 141 MMOL/L (ref 136–145)
SODIUM SERPL-SCNC: 133 MMOL/L (ref 136–145)
SODIUM SERPL-SCNC: 133 MMOL/L (ref 136–145)
SODIUM SERPL-SCNC: 135 MMOL/L (ref 136–145)
SODIUM SERPL-SCNC: 136 MMOL/L (ref 136–145)
SODIUM SERPL-SCNC: 137 MMOL/L (ref 136–145)
SODIUM SERPL-SCNC: 138 MMOL/L (ref 136–145)
SODIUM SERPL-SCNC: 138 MMOL/L (ref 136–145)
SODIUM SERPL-SCNC: 139 MMOL/L (ref 136–145)
SP GR UR STRIP: >=1.03
SP GR UR STRIP: >=1.03
SP02: 98
SPECIMEN OUTDATE: NORMAL
SRA 0.1IU/ML UFH SER-ACNC: 12 %
SRA 0.1IU/ML UFH SER-ACNC: <5 %
SRA 100IU/ML UFH SER-ACNC: <5 %
SRA 100IU/ML UFH SER-ACNC: <5 %
SRA INTERP SER-IMP: NORMAL
SRA INTERP SER-IMP: NORMAL
SRA UFH SER-IMP: NEGATIVE
SRA UFH SER-IMP: NEGATIVE
STJ: 2.7 CM
STJ: 2.8 CM
TDI LATERAL: 0.06 M/S
TDI LATERAL: 0.1 M/S
TDI SEPTAL: 0.08 M/S
TDI SEPTAL: 0.09 M/S
TDI: 0.08 M/S
TDI: 0.09 M/S
TEST PERFORMANCE INFO SPEC: NORMAL
TEST PERFORMANCE INFO SPEC: NORMAL
TRICUSPID ANNULAR PLANE SYSTOLIC EXCURSION: 1.3 CM
TRICUSPID ANNULAR PLANE SYSTOLIC EXCURSION: 1.5 CM
TRIGL SERPL-MCNC: 124 MG/DL (ref 30–150)
TROPONIN I SERPL HS-MCNC: 1016 NG/L
TROPONIN I SERPL HS-MCNC: 1114 NG/L
TROPONIN I SERPL HS-MCNC: 1195 NG/L
TROPONIN I SERPL HS-MCNC: 563 NG/L
TROPONIN I SERPL HS-MCNC: 73 NG/L
TROPONIN I SERPL HS-MCNC: 797 NG/L
TROPONIN I SERPL HS-MCNC: 834 NG/L
TSH SERPL-ACNC: 1.72 UIU/ML (ref 0.4–4)
TV PEAK GRADIENT: 30 MMHG
UNIT NUMBER: NORMAL
UROBILINOGEN UR STRIP-ACNC: NEGATIVE EU/DL
UROBILINOGEN UR STRIP-ACNC: NEGATIVE EU/DL
VANCOMYCIN SERPL-MCNC: 15 UG/ML (ref ?–80)
VANCOMYCIN SERPL-MCNC: 9.4 UG/ML (ref ?–80)
VT: 450
WBC # BLD AUTO: 6 K/UL (ref 3.9–12.7)
WBC # BLD AUTO: 6.2 K/UL (ref 3.9–12.7)
WBC # BLD AUTO: 6.21 K/UL (ref 3.9–12.7)
WBC # BLD AUTO: 6.3 K/UL (ref 3.9–12.7)
WBC # BLD AUTO: 6.49 K/UL (ref 3.9–12.7)
WBC # BLD AUTO: 6.74 K/UL (ref 3.9–12.7)
WBC # BLD AUTO: 6.9 K/UL (ref 3.9–12.7)
WBC # BLD AUTO: 6.92 K/UL (ref 3.9–12.7)
WBC # BLD AUTO: 7.12 K/UL (ref 3.9–12.7)
WBC # BLD AUTO: 7.23 K/UL (ref 3.9–12.7)
WBC # BLD AUTO: 7.26 K/UL (ref 3.9–12.7)
WBC # BLD AUTO: 7.76 K/UL (ref 3.9–12.7)
WBC # BLD AUTO: 7.86 K/UL (ref 3.9–12.7)
WBC # BLD AUTO: 8.05 K/UL (ref 3.9–12.7)
WBC # BLD AUTO: 8.78 K/UL (ref 3.9–12.7)
WBC # BLD AUTO: 8.83 K/UL (ref 3.9–12.7)
WBC # BLD AUTO: 9.15 K/UL (ref 3.9–12.7)
WBC # BLD AUTO: 9.26 K/UL (ref 3.9–12.7)
WBC #/AREA URNS AUTO: 10 /HPF (ref 0–5)
YEAST UR QL AUTO: ABNORMAL /HPF
Z-SCORE OF LEFT VENTRICULAR DIMENSION IN END DIASTOLE: -10.86
Z-SCORE OF LEFT VENTRICULAR DIMENSION IN END DIASTOLE: -8.07
Z-SCORE OF LEFT VENTRICULAR DIMENSION IN END SYSTOLE: -5.83
Z-SCORE OF LEFT VENTRICULAR DIMENSION IN END SYSTOLE: -8.56

## 2025-01-01 PROCEDURE — 84100 ASSAY OF PHOSPHORUS: CPT | Performed by: PHYSICIAN ASSISTANT

## 2025-01-01 PROCEDURE — 95700 EEG CONT REC W/VID EEG TECH: CPT

## 2025-01-01 PROCEDURE — 93970 EXTREMITY STUDY: CPT | Mod: TC

## 2025-01-01 PROCEDURE — 25000003 PHARM REV CODE 250

## 2025-01-01 PROCEDURE — 85025 COMPLETE CBC W/AUTO DIFF WBC: CPT | Performed by: EMERGENCY MEDICINE

## 2025-01-01 PROCEDURE — 99233 SBSQ HOSP IP/OBS HIGH 50: CPT | Mod: GV,,, | Performed by: EMERGENCY MEDICINE

## 2025-01-01 PROCEDURE — 83735 ASSAY OF MAGNESIUM: CPT

## 2025-01-01 PROCEDURE — 82803 BLOOD GASES ANY COMBINATION: CPT

## 2025-01-01 PROCEDURE — 82330 ASSAY OF CALCIUM: CPT

## 2025-01-01 PROCEDURE — 06CN3ZZ EXTIRPATION OF MATTER FROM LEFT FEMORAL VEIN, PERCUTANEOUS APPROACH: ICD-10-PCS | Performed by: RADIOLOGY

## 2025-01-01 PROCEDURE — 81003 URINALYSIS AUTO W/O SCOPE: CPT | Performed by: EMERGENCY MEDICINE

## 2025-01-01 PROCEDURE — 25500020 PHARM REV CODE 255: Performed by: EMERGENCY MEDICINE

## 2025-01-01 PROCEDURE — 95714 VEEG EA 12-26 HR UNMNTR: CPT

## 2025-01-01 PROCEDURE — 84484 ASSAY OF TROPONIN QUANT: CPT | Performed by: PSYCHIATRY & NEUROLOGY

## 2025-01-01 PROCEDURE — 30233M1 TRANSFUSION OF NONAUTOLOGOUS PLASMA CRYOPRECIPITATE INTO PERIPHERAL VEIN, PERCUTANEOUS APPROACH: ICD-10-PCS

## 2025-01-01 PROCEDURE — 94003 VENT MGMT INPAT SUBQ DAY: CPT

## 2025-01-01 PROCEDURE — 11000001 HC ACUTE MED/SURG PRIVATE ROOM

## 2025-01-01 PROCEDURE — 84100 ASSAY OF PHOSPHORUS: CPT

## 2025-01-01 PROCEDURE — 83735 ASSAY OF MAGNESIUM: CPT | Performed by: PHYSICIAN ASSISTANT

## 2025-01-01 PROCEDURE — 85730 THROMBOPLASTIN TIME PARTIAL: CPT

## 2025-01-01 PROCEDURE — 63600175 PHARM REV CODE 636 W HCPCS: Mod: JZ | Performed by: STUDENT IN AN ORGANIZED HEALTH CARE EDUCATION/TRAINING PROGRAM

## 2025-01-01 PROCEDURE — 96375 TX/PRO/DX INJ NEW DRUG ADDON: CPT

## 2025-01-01 PROCEDURE — 80053 COMPREHEN METABOLIC PANEL: CPT | Performed by: PHYSICIAN ASSISTANT

## 2025-01-01 PROCEDURE — P9016 RBC LEUKOCYTES REDUCED: HCPCS

## 2025-01-01 PROCEDURE — 89220 SPUTUM SPECIMEN COLLECTION: CPT

## 2025-01-01 PROCEDURE — 36430 TRANSFUSION BLD/BLD COMPNT: CPT

## 2025-01-01 PROCEDURE — 99291 CRITICAL CARE FIRST HOUR: CPT | Mod: GC,,, | Performed by: PSYCHIATRY & NEUROLOGY

## 2025-01-01 PROCEDURE — 25000003 PHARM REV CODE 250: Performed by: PSYCHIATRY & NEUROLOGY

## 2025-01-01 PROCEDURE — 63600175 PHARM REV CODE 636 W HCPCS

## 2025-01-01 PROCEDURE — 94761 N-INVAS EAR/PLS OXIMETRY MLT: CPT

## 2025-01-01 PROCEDURE — 95720 EEG PHY/QHP EA INCR W/VEEG: CPT | Mod: ,,, | Performed by: STUDENT IN AN ORGANIZED HEALTH CARE EDUCATION/TRAINING PROGRAM

## 2025-01-01 PROCEDURE — 63600175 PHARM REV CODE 636 W HCPCS: Performed by: EMERGENCY MEDICINE

## 2025-01-01 PROCEDURE — 99233 SBSQ HOSP IP/OBS HIGH 50: CPT | Mod: GC,,, | Performed by: PSYCHIATRY & NEUROLOGY

## 2025-01-01 PROCEDURE — 99497 ADVNCD CARE PLAN 30 MIN: CPT | Mod: 25,,, | Performed by: STUDENT IN AN ORGANIZED HEALTH CARE EDUCATION/TRAINING PROGRAM

## 2025-01-01 PROCEDURE — 85730 THROMBOPLASTIN TIME PARTIAL: CPT | Performed by: PHYSICIAN ASSISTANT

## 2025-01-01 PROCEDURE — 99232 SBSQ HOSP IP/OBS MODERATE 35: CPT | Mod: ,,, | Performed by: INTERNAL MEDICINE

## 2025-01-01 PROCEDURE — 85384 FIBRINOGEN ACTIVITY: CPT

## 2025-01-01 PROCEDURE — 83605 ASSAY OF LACTIC ACID: CPT | Performed by: PSYCHIATRY & NEUROLOGY

## 2025-01-01 PROCEDURE — 87205 SMEAR GRAM STAIN: CPT

## 2025-01-01 PROCEDURE — B51C1ZZ FLUOROSCOPY OF LEFT LOWER EXTREMITY VEINS USING LOW OSMOLAR CONTRAST: ICD-10-PCS | Performed by: RADIOLOGY

## 2025-01-01 PROCEDURE — 85025 COMPLETE CBC W/AUTO DIFF WBC: CPT

## 2025-01-01 PROCEDURE — 25000003 PHARM REV CODE 250: Performed by: PHYSICIAN ASSISTANT

## 2025-01-01 PROCEDURE — 37799 UNLISTED PX VASCULAR SURGERY: CPT

## 2025-01-01 PROCEDURE — 20000000 HC ICU ROOM

## 2025-01-01 PROCEDURE — 85610 PROTHROMBIN TIME: CPT

## 2025-01-01 PROCEDURE — 86920 COMPATIBILITY TEST SPIN: CPT

## 2025-01-01 PROCEDURE — 63600175 PHARM REV CODE 636 W HCPCS: Mod: JZ | Performed by: EMERGENCY MEDICINE

## 2025-01-01 PROCEDURE — 63600175 PHARM REV CODE 636 W HCPCS: Performed by: PSYCHIATRY & NEUROLOGY

## 2025-01-01 PROCEDURE — 99499 UNLISTED E&M SERVICE: CPT | Mod: ,,,

## 2025-01-01 PROCEDURE — 80061 LIPID PANEL: CPT

## 2025-01-01 PROCEDURE — 99900035 HC TECH TIME PER 15 MIN (STAT)

## 2025-01-01 PROCEDURE — 85025 COMPLETE CBC W/AUTO DIFF WBC: CPT | Performed by: PSYCHIATRY & NEUROLOGY

## 2025-01-01 PROCEDURE — 81003 URINALYSIS AUTO W/O SCOPE: CPT | Performed by: PSYCHIATRY & NEUROLOGY

## 2025-01-01 PROCEDURE — 85730 THROMBOPLASTIN TIME PARTIAL: CPT | Performed by: PSYCHIATRY & NEUROLOGY

## 2025-01-01 PROCEDURE — 96374 THER/PROPH/DIAG INJ IV PUSH: CPT

## 2025-01-01 PROCEDURE — P9016 RBC LEUKOCYTES REDUCED: HCPCS | Performed by: REGISTERED NURSE

## 2025-01-01 PROCEDURE — 80076 HEPATIC FUNCTION PANEL: CPT

## 2025-01-01 PROCEDURE — 82542 COL CHROMOTOGRAPHY QUAL/QUAN: CPT

## 2025-01-01 PROCEDURE — 63600175 PHARM REV CODE 636 W HCPCS: Performed by: PHYSICIAN ASSISTANT

## 2025-01-01 PROCEDURE — 94760 N-INVAS EAR/PLS OXIMETRY 1: CPT

## 2025-01-01 PROCEDURE — 25000003 PHARM REV CODE 250: Performed by: EMERGENCY MEDICINE

## 2025-01-01 PROCEDURE — 99291 CRITICAL CARE FIRST HOUR: CPT | Mod: ,,, | Performed by: PSYCHIATRY & NEUROLOGY

## 2025-01-01 PROCEDURE — 99900026 HC AIRWAY MAINTENANCE (STAT)

## 2025-01-01 PROCEDURE — 06CD3ZZ EXTIRPATION OF MATTER FROM LEFT COMMON ILIAC VEIN, PERCUTANEOUS APPROACH: ICD-10-PCS | Performed by: RADIOLOGY

## 2025-01-01 PROCEDURE — 94761 N-INVAS EAR/PLS OXIMETRY MLT: CPT | Mod: XB

## 2025-01-01 PROCEDURE — 63600175 PHARM REV CODE 636 W HCPCS: Mod: JZ,TB

## 2025-01-01 PROCEDURE — 97110 THERAPEUTIC EXERCISES: CPT

## 2025-01-01 PROCEDURE — 99291 CRITICAL CARE FIRST HOUR: CPT | Mod: ,,,

## 2025-01-01 PROCEDURE — 99233 SBSQ HOSP IP/OBS HIGH 50: CPT | Mod: ,,, | Performed by: PHYSICIAN ASSISTANT

## 2025-01-01 PROCEDURE — 93005 ELECTROCARDIOGRAM TRACING: CPT

## 2025-01-01 PROCEDURE — 83690 ASSAY OF LIPASE: CPT | Performed by: EMERGENCY MEDICINE

## 2025-01-01 PROCEDURE — 27200966 HC CLOSED SUCTION SYSTEM

## 2025-01-01 PROCEDURE — 63600175 PHARM REV CODE 636 W HCPCS: Performed by: RADIOLOGY

## 2025-01-01 PROCEDURE — 82565 ASSAY OF CREATININE: CPT

## 2025-01-01 PROCEDURE — 27100171 HC OXYGEN HIGH FLOW UP TO 24 HOURS

## 2025-01-01 PROCEDURE — P9012 CRYOPRECIPITATE EACH UNIT: HCPCS

## 2025-01-01 PROCEDURE — 80053 COMPREHEN METABOLIC PANEL: CPT

## 2025-01-01 PROCEDURE — 99291 CRITICAL CARE FIRST HOUR: CPT | Mod: FS,,, | Performed by: PSYCHIATRY & NEUROLOGY

## 2025-01-01 PROCEDURE — 51702 INSERT TEMP BLADDER CATH: CPT

## 2025-01-01 PROCEDURE — 97162 PT EVAL MOD COMPLEX 30 MIN: CPT

## 2025-01-01 PROCEDURE — 82040 ASSAY OF SERUM ALBUMIN: CPT

## 2025-01-01 PROCEDURE — 80053 COMPREHEN METABOLIC PANEL: CPT | Performed by: PSYCHIATRY & NEUROLOGY

## 2025-01-01 PROCEDURE — 84295 ASSAY OF SERUM SODIUM: CPT

## 2025-01-01 PROCEDURE — 85014 HEMATOCRIT: CPT

## 2025-01-01 PROCEDURE — 80202 ASSAY OF VANCOMYCIN: CPT | Performed by: PSYCHIATRY & NEUROLOGY

## 2025-01-01 PROCEDURE — 87040 BLOOD CULTURE FOR BACTERIA: CPT | Performed by: PSYCHIATRY & NEUROLOGY

## 2025-01-01 PROCEDURE — 25000003 PHARM REV CODE 250: Performed by: ANESTHESIOLOGY

## 2025-01-01 PROCEDURE — 94799 UNLISTED PULMONARY SVC/PX: CPT

## 2025-01-01 PROCEDURE — 97535 SELF CARE MNGMENT TRAINING: CPT

## 2025-01-01 PROCEDURE — 94002 VENT MGMT INPAT INIT DAY: CPT

## 2025-01-01 PROCEDURE — 63600175 PHARM REV CODE 636 W HCPCS: Mod: JZ,TB | Performed by: STUDENT IN AN ORGANIZED HEALTH CARE EDUCATION/TRAINING PROGRAM

## 2025-01-01 PROCEDURE — 85025 COMPLETE CBC W/AUTO DIFF WBC: CPT | Performed by: PHYSICIAN ASSISTANT

## 2025-01-01 PROCEDURE — 80053 COMPREHEN METABOLIC PANEL: CPT | Performed by: EMERGENCY MEDICINE

## 2025-01-01 PROCEDURE — 80053 COMPREHEN METABOLIC PANEL: CPT | Performed by: REGISTERED NURSE

## 2025-01-01 PROCEDURE — 85610 PROTHROMBIN TIME: CPT | Performed by: PSYCHIATRY & NEUROLOGY

## 2025-01-01 PROCEDURE — 3E03317 INTRODUCTION OF OTHER THROMBOLYTIC INTO PERIPHERAL VEIN, PERCUTANEOUS APPROACH: ICD-10-PCS | Performed by: NURSE PRACTITIONER

## 2025-01-01 PROCEDURE — 63600175 PHARM REV CODE 636 W HCPCS: Performed by: ANESTHESIOLOGY

## 2025-01-01 PROCEDURE — 99223 1ST HOSP IP/OBS HIGH 75: CPT | Mod: 25,,, | Performed by: STUDENT IN AN ORGANIZED HEALTH CARE EDUCATION/TRAINING PROGRAM

## 2025-01-01 PROCEDURE — 06CY3ZZ EXTIRPATION OF MATTER FROM LOWER VEIN, PERCUTANEOUS APPROACH: ICD-10-PCS | Performed by: RADIOLOGY

## 2025-01-01 PROCEDURE — 82040 ASSAY OF SERUM ALBUMIN: CPT | Performed by: PHYSICIAN ASSISTANT

## 2025-01-01 PROCEDURE — 86965 POOLING BLOOD PLATELETS: CPT

## 2025-01-01 PROCEDURE — 99292 CRITICAL CARE ADDL 30 MIN: CPT | Mod: ,,, | Performed by: PSYCHIATRY & NEUROLOGY

## 2025-01-01 PROCEDURE — 99233 SBSQ HOSP IP/OBS HIGH 50: CPT | Mod: ,,, | Performed by: STUDENT IN AN ORGANIZED HEALTH CARE EDUCATION/TRAINING PROGRAM

## 2025-01-01 PROCEDURE — 85384 FIBRINOGEN ACTIVITY: CPT | Performed by: PSYCHIATRY & NEUROLOGY

## 2025-01-01 PROCEDURE — 83735 ASSAY OF MAGNESIUM: CPT | Performed by: REGISTERED NURSE

## 2025-01-01 PROCEDURE — 83036 HEMOGLOBIN GLYCOSYLATED A1C: CPT

## 2025-01-01 PROCEDURE — 82542 COL CHROMOTOGRAPHY QUAL/QUAN: CPT | Performed by: STUDENT IN AN ORGANIZED HEALTH CARE EDUCATION/TRAINING PROGRAM

## 2025-01-01 PROCEDURE — 99223 1ST HOSP IP/OBS HIGH 75: CPT | Mod: GV,,, | Performed by: EMERGENCY MEDICINE

## 2025-01-01 PROCEDURE — 25500020 PHARM REV CODE 255: Performed by: PSYCHIATRY & NEUROLOGY

## 2025-01-01 PROCEDURE — 99291 CRITICAL CARE FIRST HOUR: CPT | Mod: ,,, | Performed by: NURSE PRACTITIONER

## 2025-01-01 PROCEDURE — A9585 GADOBUTROL INJECTION: HCPCS | Performed by: EMERGENCY MEDICINE

## 2025-01-01 PROCEDURE — 86920 COMPATIBILITY TEST SPIN: CPT | Performed by: REGISTERED NURSE

## 2025-01-01 PROCEDURE — 93010 ELECTROCARDIOGRAM REPORT: CPT | Mod: ,,, | Performed by: INTERNAL MEDICINE

## 2025-01-01 PROCEDURE — 99285 EMERGENCY DEPT VISIT HI MDM: CPT | Mod: 25

## 2025-01-01 PROCEDURE — 84132 ASSAY OF SERUM POTASSIUM: CPT

## 2025-01-01 PROCEDURE — 06PY3DZ REMOVAL OF INTRALUMINAL DEVICE FROM LOWER VEIN, PERCUTANEOUS APPROACH: ICD-10-PCS | Performed by: RADIOLOGY

## 2025-01-01 PROCEDURE — 97165 OT EVAL LOW COMPLEX 30 MIN: CPT

## 2025-01-01 PROCEDURE — 84484 ASSAY OF TROPONIN QUANT: CPT

## 2025-01-01 PROCEDURE — 99223 1ST HOSP IP/OBS HIGH 75: CPT | Mod: ,,, | Performed by: PHYSICIAN ASSISTANT

## 2025-01-01 PROCEDURE — 83880 ASSAY OF NATRIURETIC PEPTIDE: CPT | Performed by: PSYCHIATRY & NEUROLOGY

## 2025-01-01 PROCEDURE — 5A12012 PERFORMANCE OF CARDIAC OUTPUT, SINGLE, MANUAL: ICD-10-PCS | Performed by: PSYCHIATRY & NEUROLOGY

## 2025-01-01 PROCEDURE — 0BH17EZ INSERTION OF ENDOTRACHEAL AIRWAY INTO TRACHEA, VIA NATURAL OR ARTIFICIAL OPENING: ICD-10-PCS | Performed by: RADIOLOGY

## 2025-01-01 PROCEDURE — 30233N1 TRANSFUSION OF NONAUTOLOGOUS RED BLOOD CELLS INTO PERIPHERAL VEIN, PERCUTANEOUS APPROACH: ICD-10-PCS

## 2025-01-01 PROCEDURE — 86901 BLOOD TYPING SEROLOGIC RH(D): CPT | Performed by: PSYCHIATRY & NEUROLOGY

## 2025-01-01 PROCEDURE — 82962 GLUCOSE BLOOD TEST: CPT

## 2025-01-01 PROCEDURE — 99223 1ST HOSP IP/OBS HIGH 75: CPT | Mod: ,,, | Performed by: NEUROLOGICAL SURGERY

## 2025-01-01 PROCEDURE — 84443 ASSAY THYROID STIM HORMONE: CPT

## 2025-01-01 PROCEDURE — 5A1955Z RESPIRATORY VENTILATION, GREATER THAN 96 CONSECUTIVE HOURS: ICD-10-PCS | Performed by: RADIOLOGY

## 2025-01-01 PROCEDURE — 87641 MR-STAPH DNA AMP PROBE: CPT

## 2025-01-01 PROCEDURE — 06CG3ZZ EXTIRPATION OF MATTER FROM LEFT EXTERNAL ILIAC VEIN, PERCUTANEOUS APPROACH: ICD-10-PCS | Performed by: RADIOLOGY

## 2025-01-01 PROCEDURE — 82310 ASSAY OF CALCIUM: CPT | Performed by: PSYCHIATRY & NEUROLOGY

## 2025-01-01 PROCEDURE — 63600175 PHARM REV CODE 636 W HCPCS: Performed by: STUDENT IN AN ORGANIZED HEALTH CARE EDUCATION/TRAINING PROGRAM

## 2025-01-01 PROCEDURE — 84100 ASSAY OF PHOSPHORUS: CPT | Performed by: REGISTERED NURSE

## 2025-01-01 RX ORDER — PRAVASTATIN SODIUM 40 MG/1
40 TABLET ORAL DAILY
Status: DISCONTINUED | OUTPATIENT
Start: 2025-01-01 | End: 2025-01-01

## 2025-01-01 RX ORDER — EPINEPHRINE 0.1 MG/ML
INJECTION INTRAVENOUS CODE/TRAUMA/SEDATION MEDICATION
Status: DISCONTINUED | OUTPATIENT
Start: 2025-01-01 | End: 2025-01-01

## 2025-01-01 RX ORDER — SCOPOLAMINE 1 MG/3D
1 PATCH, EXTENDED RELEASE TRANSDERMAL
Status: DISCONTINUED | OUTPATIENT
Start: 2025-01-01 | End: 2025-01-01 | Stop reason: HOSPADM

## 2025-01-01 RX ORDER — GLYCOPYRROLATE 0.2 MG/ML
0.4 INJECTION INTRAMUSCULAR; INTRAVENOUS EVERY 4 HOURS PRN
Status: DISCONTINUED | OUTPATIENT
Start: 2025-01-01 | End: 2025-01-01 | Stop reason: HOSPADM

## 2025-01-01 RX ORDER — MUPIROCIN 20 MG/G
OINTMENT TOPICAL 2 TIMES DAILY
Status: DISCONTINUED | OUTPATIENT
Start: 2025-01-01 | End: 2025-01-01

## 2025-01-01 RX ORDER — SCOPOLAMINE 1 MG/3D
1 PATCH, EXTENDED RELEASE TRANSDERMAL
Status: DISCONTINUED | OUTPATIENT
Start: 2025-01-01 | End: 2025-01-01

## 2025-01-01 RX ORDER — INSULIN ASPART 100 [IU]/ML
1-10 INJECTION, SOLUTION INTRAVENOUS; SUBCUTANEOUS EVERY 6 HOURS PRN
Status: DISCONTINUED | OUTPATIENT
Start: 2025-01-01 | End: 2025-01-01

## 2025-01-01 RX ORDER — LORAZEPAM 2 MG/ML
1 INJECTION INTRAMUSCULAR EVERY 4 HOURS PRN
Status: DISCONTINUED | OUTPATIENT
Start: 2025-01-01 | End: 2025-01-01

## 2025-01-01 RX ORDER — LORAZEPAM 2 MG/ML
2 INJECTION INTRAMUSCULAR EVERY 30 MIN PRN
Status: DISCONTINUED | OUTPATIENT
Start: 2025-01-01 | End: 2025-01-01 | Stop reason: HOSPADM

## 2025-01-01 RX ORDER — MORPHINE SULFATE 2 MG/ML
2 INJECTION, SOLUTION INTRAMUSCULAR; INTRAVENOUS
Status: DISCONTINUED | OUTPATIENT
Start: 2025-01-01 | End: 2025-01-01 | Stop reason: HOSPADM

## 2025-01-01 RX ORDER — NOREPINEPHRINE BITARTRATE/D5W 4MG/250ML
0-.2 PLASTIC BAG, INJECTION (ML) INTRAVENOUS CONTINUOUS
Status: DISPENSED | OUTPATIENT
Start: 2025-01-01 | End: 2025-01-01

## 2025-01-01 RX ORDER — SILODOSIN 4 MG/1
4 CAPSULE ORAL DAILY
Status: DISCONTINUED | OUTPATIENT
Start: 2025-01-01 | End: 2025-01-01

## 2025-01-01 RX ORDER — ARGATROBAN 1 MG/ML
0-10 INJECTION INTRAVENOUS CONTINUOUS
Status: DISCONTINUED | OUTPATIENT
Start: 2025-01-01 | End: 2025-01-01

## 2025-01-01 RX ORDER — PHENYLEPHRINE HCL IN 0.9% NACL 1 MG/10 ML
SYRINGE (ML) INTRAVENOUS
Status: DISPENSED
Start: 2025-01-01 | End: 2025-01-01

## 2025-01-01 RX ORDER — LABETALOL HCL 20 MG/4 ML
10 SYRINGE (ML) INTRAVENOUS EVERY 4 HOURS PRN
Status: DISCONTINUED | OUTPATIENT
Start: 2025-01-01 | End: 2025-01-01

## 2025-01-01 RX ORDER — INSULIN ASPART 100 [IU]/ML
0-5 INJECTION, SOLUTION INTRAVENOUS; SUBCUTANEOUS EVERY 6 HOURS PRN
Status: DISCONTINUED | OUTPATIENT
Start: 2025-01-01 | End: 2025-01-01

## 2025-01-01 RX ORDER — HEPARIN SODIUM,PORCINE/D5W 25000/250
0-40 INTRAVENOUS SOLUTION INTRAVENOUS CONTINUOUS
Status: DISCONTINUED | OUTPATIENT
Start: 2025-01-01 | End: 2025-01-01

## 2025-01-01 RX ORDER — POLYETHYLENE GLYCOL 3350 17 G/17G
17 POWDER, FOR SOLUTION ORAL DAILY
Status: DISCONTINUED | OUTPATIENT
Start: 2025-01-01 | End: 2025-01-01

## 2025-01-01 RX ORDER — MIDAZOLAM HYDROCHLORIDE 1 MG/ML
INJECTION, SOLUTION INTRAMUSCULAR; INTRAVENOUS
Status: COMPLETED
Start: 2025-01-01 | End: 2025-01-01

## 2025-01-01 RX ORDER — HYDROCODONE BITARTRATE AND ACETAMINOPHEN 500; 5 MG/1; MG/1
TABLET ORAL
Status: DISCONTINUED | OUTPATIENT
Start: 2025-01-01 | End: 2025-01-01

## 2025-01-01 RX ORDER — PROCHLORPERAZINE EDISYLATE 5 MG/ML
5 INJECTION INTRAMUSCULAR; INTRAVENOUS
Status: DISCONTINUED | OUTPATIENT
Start: 2025-01-01 | End: 2025-01-01

## 2025-01-01 RX ORDER — MORPHINE SULFATE 2 MG/ML
2 INJECTION, SOLUTION INTRAMUSCULAR; INTRAVENOUS
Status: DISCONTINUED | OUTPATIENT
Start: 2025-01-01 | End: 2025-01-01

## 2025-01-01 RX ORDER — MORPHINE SULFATE/0.9% NACL/PF 1 MG/ML
1 PLASTIC BAG, INJECTION (ML) INTRAVENOUS CONTINUOUS
Refills: 0 | Status: CANCELLED | OUTPATIENT
Start: 2025-01-01

## 2025-01-01 RX ORDER — ACETAMINOPHEN 325 MG/1
650 TABLET ORAL EVERY 6 HOURS PRN
Status: DISCONTINUED | OUTPATIENT
Start: 2025-01-01 | End: 2025-01-01

## 2025-01-01 RX ORDER — TAMSULOSIN HYDROCHLORIDE 0.4 MG/1
0.8 CAPSULE ORAL DAILY
Status: DISCONTINUED | OUTPATIENT
Start: 2025-01-01 | End: 2025-01-01

## 2025-01-01 RX ORDER — DROPERIDOL 2.5 MG/ML
2.5 INJECTION, SOLUTION INTRAMUSCULAR; INTRAVENOUS
Status: COMPLETED | OUTPATIENT
Start: 2025-01-01 | End: 2025-01-01

## 2025-01-01 RX ORDER — LEVETIRACETAM 500 MG/5ML
750 INJECTION, SOLUTION, CONCENTRATE INTRAVENOUS EVERY 12 HOURS
Status: DISCONTINUED | OUTPATIENT
Start: 2025-01-01 | End: 2025-01-01

## 2025-01-01 RX ORDER — HYDRALAZINE HYDROCHLORIDE 20 MG/ML
10 INJECTION INTRAMUSCULAR; INTRAVENOUS EVERY 4 HOURS PRN
Status: DISCONTINUED | OUTPATIENT
Start: 2025-01-01 | End: 2025-01-01

## 2025-01-01 RX ORDER — PRAVASTATIN SODIUM 20 MG/1
40 TABLET ORAL DAILY
Status: DISCONTINUED | OUTPATIENT
Start: 2025-01-01 | End: 2025-01-01

## 2025-01-01 RX ORDER — FENTANYL CITRATE 50 UG/ML
25 INJECTION, SOLUTION INTRAMUSCULAR; INTRAVENOUS
Refills: 0 | Status: DISCONTINUED | OUTPATIENT
Start: 2025-01-01 | End: 2025-01-01

## 2025-01-01 RX ORDER — SCOPOLAMINE 1 MG/3D
1 PATCH, EXTENDED RELEASE TRANSDERMAL
Status: CANCELLED | OUTPATIENT
Start: 2025-01-01

## 2025-01-01 RX ORDER — LORAZEPAM 2 MG/ML
1 INJECTION INTRAMUSCULAR EVERY 4 HOURS PRN
Status: DISCONTINUED | OUTPATIENT
Start: 2025-01-01 | End: 2025-01-01 | Stop reason: HOSPADM

## 2025-01-01 RX ORDER — FENTANYL CITRATE 50 UG/ML
INJECTION, SOLUTION INTRAMUSCULAR; INTRAVENOUS
Status: COMPLETED | OUTPATIENT
Start: 2025-01-01 | End: 2025-01-01

## 2025-01-01 RX ORDER — MORPHINE SULFATE/0.9% NACL/PF 1 MG/ML
1 PLASTIC BAG, INJECTION (ML) INTRAVENOUS CONTINUOUS
Status: DISCONTINUED | OUTPATIENT
Start: 2025-01-01 | End: 2025-01-01 | Stop reason: HOSPADM

## 2025-01-01 RX ORDER — LEVETIRACETAM 500 MG/5ML
750 INJECTION, SOLUTION, CONCENTRATE INTRAVENOUS EVERY 12 HOURS
Status: DISCONTINUED | OUTPATIENT
Start: 2025-01-01 | End: 2025-01-01 | Stop reason: HOSPADM

## 2025-01-01 RX ORDER — LEVETIRACETAM 500 MG/5ML
750 INJECTION, SOLUTION, CONCENTRATE INTRAVENOUS EVERY 12 HOURS
Status: CANCELLED | OUTPATIENT
Start: 2025-01-01

## 2025-01-01 RX ORDER — CLONAZEPAM 0.5 MG/1
0.5 TABLET ORAL DAILY PRN
Status: DISCONTINUED | OUTPATIENT
Start: 2025-01-01 | End: 2025-01-01

## 2025-01-01 RX ORDER — INSULIN ASPART 100 [IU]/ML
1-10 INJECTION, SOLUTION INTRAVENOUS; SUBCUTANEOUS EVERY 4 HOURS PRN
Status: DISCONTINUED | OUTPATIENT
Start: 2025-01-01 | End: 2025-01-01

## 2025-01-01 RX ORDER — SODIUM CHLORIDE 9 MG/ML
INJECTION, SOLUTION INTRAVENOUS CONTINUOUS
Status: DISCONTINUED | OUTPATIENT
Start: 2025-01-01 | End: 2025-01-01

## 2025-01-01 RX ORDER — MIDAZOLAM HYDROCHLORIDE 1 MG/ML
2 INJECTION, SOLUTION INTRAMUSCULAR; INTRAVENOUS ONCE
Status: COMPLETED | OUTPATIENT
Start: 2025-01-01 | End: 2025-01-01

## 2025-01-01 RX ORDER — KETOROLAC TROMETHAMINE 15 MG/ML
15 INJECTION, SOLUTION INTRAMUSCULAR; INTRAVENOUS ONCE
Status: COMPLETED | OUTPATIENT
Start: 2025-01-01 | End: 2025-01-01

## 2025-01-01 RX ORDER — LORAZEPAM 2 MG/ML
1 INJECTION INTRAMUSCULAR EVERY 4 HOURS PRN
Status: CANCELLED | OUTPATIENT
Start: 2025-01-01

## 2025-01-01 RX ORDER — PROPOFOL 10 MG/ML
INJECTION, EMULSION INTRAVENOUS
Status: COMPLETED
Start: 2025-01-01 | End: 2025-01-01

## 2025-01-01 RX ORDER — DIPHENHYDRAMINE HYDROCHLORIDE 50 MG/ML
25 INJECTION, SOLUTION INTRAMUSCULAR; INTRAVENOUS
Status: COMPLETED | OUTPATIENT
Start: 2025-01-01 | End: 2025-01-01

## 2025-01-01 RX ORDER — ONDANSETRON HYDROCHLORIDE 2 MG/ML
4 INJECTION, SOLUTION INTRAVENOUS EVERY 4 HOURS PRN
Status: DISCONTINUED | OUTPATIENT
Start: 2025-01-01 | End: 2025-01-01 | Stop reason: HOSPADM

## 2025-01-01 RX ORDER — FENTANYL CITRATE-0.9 % NACL/PF 10 MCG/ML
0-500 PLASTIC BAG, INJECTION (ML) INTRAVENOUS CONTINUOUS
Refills: 0 | Status: DISCONTINUED | OUTPATIENT
Start: 2025-01-01 | End: 2025-01-01

## 2025-01-01 RX ORDER — FAMOTIDINE 10 MG/ML
20 INJECTION, SOLUTION INTRAVENOUS DAILY
Status: DISCONTINUED | OUTPATIENT
Start: 2025-01-01 | End: 2025-01-01

## 2025-01-01 RX ORDER — AMOXICILLIN 250 MG
1 CAPSULE ORAL 2 TIMES DAILY
Status: DISCONTINUED | OUTPATIENT
Start: 2025-01-01 | End: 2025-01-01

## 2025-01-01 RX ORDER — NOREPINEPHRINE BITARTRATE/D5W 4MG/250ML
0-.2 PLASTIC BAG, INJECTION (ML) INTRAVENOUS CONTINUOUS
Status: DISCONTINUED | OUTPATIENT
Start: 2025-01-01 | End: 2025-01-01

## 2025-01-01 RX ORDER — CALCIUM GLUCONATE 20 MG/ML
1 INJECTION, SOLUTION INTRAVENOUS
Status: COMPLETED | OUTPATIENT
Start: 2025-01-01 | End: 2025-01-01

## 2025-01-01 RX ORDER — LORAZEPAM 2 MG/ML
1 INJECTION INTRAMUSCULAR
Status: COMPLETED | OUTPATIENT
Start: 2025-01-01 | End: 2025-01-01

## 2025-01-01 RX ORDER — ONDANSETRON HYDROCHLORIDE 2 MG/ML
4 INJECTION, SOLUTION INTRAVENOUS EVERY 4 HOURS PRN
Status: CANCELLED | OUTPATIENT
Start: 2025-01-01

## 2025-01-01 RX ORDER — GLYCOPYRROLATE 0.2 MG/ML
0.2 INJECTION INTRAMUSCULAR; INTRAVENOUS EVERY 4 HOURS PRN
Status: DISCONTINUED | OUTPATIENT
Start: 2025-01-01 | End: 2025-01-01

## 2025-01-01 RX ORDER — MORPHINE SULFATE 2 MG/ML
2 INJECTION, SOLUTION INTRAMUSCULAR; INTRAVENOUS
Refills: 0 | Status: CANCELLED | OUTPATIENT
Start: 2025-01-01

## 2025-01-01 RX ORDER — MIDAZOLAM HYDROCHLORIDE 1 MG/ML
INJECTION, SOLUTION INTRAMUSCULAR; INTRAVENOUS
Status: COMPLETED | OUTPATIENT
Start: 2025-01-01 | End: 2025-01-01

## 2025-01-01 RX ORDER — PROPOFOL 10 MG/ML
0-50 INJECTION, EMULSION INTRAVENOUS CONTINUOUS
Status: DISCONTINUED | OUTPATIENT
Start: 2025-01-01 | End: 2025-01-01

## 2025-01-01 RX ORDER — PANTOPRAZOLE SODIUM 40 MG/10ML
40 INJECTION, POWDER, LYOPHILIZED, FOR SOLUTION INTRAVENOUS 2 TIMES DAILY
Status: DISCONTINUED | OUTPATIENT
Start: 2025-01-01 | End: 2025-01-01

## 2025-01-01 RX ORDER — GLYCOPYRROLATE 0.2 MG/ML
0.2 INJECTION INTRAMUSCULAR; INTRAVENOUS 3 TIMES DAILY PRN
Status: DISCONTINUED | OUTPATIENT
Start: 2025-01-01 | End: 2025-01-01

## 2025-01-01 RX ORDER — GLUCAGON 1 MG
1 KIT INJECTION
Status: DISCONTINUED | OUTPATIENT
Start: 2025-01-01 | End: 2025-01-01

## 2025-01-01 RX ORDER — SODIUM CHLORIDE, SODIUM LACTATE, POTASSIUM CHLORIDE, CALCIUM CHLORIDE 600; 310; 30; 20 MG/100ML; MG/100ML; MG/100ML; MG/100ML
INJECTION, SOLUTION INTRAVENOUS CONTINUOUS
Status: DISCONTINUED | OUTPATIENT
Start: 2025-01-01 | End: 2025-01-01

## 2025-01-01 RX ORDER — MORPHINE SULFATE/0.9% NACL/PF 1 MG/ML
1 PLASTIC BAG, INJECTION (ML) INTRAVENOUS CONTINUOUS
Status: DISCONTINUED | OUTPATIENT
Start: 2025-01-01 | End: 2025-01-01

## 2025-01-01 RX ORDER — GADOBUTROL 604.72 MG/ML
10 INJECTION INTRAVENOUS
Status: COMPLETED | OUTPATIENT
Start: 2025-01-01 | End: 2025-01-01

## 2025-01-01 RX ORDER — GLYCOPYRROLATE 0.2 MG/ML
0.2 INJECTION INTRAMUSCULAR; INTRAVENOUS EVERY 4 HOURS PRN
Status: CANCELLED | OUTPATIENT
Start: 2025-01-01

## 2025-01-01 RX ORDER — SODIUM CHLORIDE 0.9 % (FLUSH) 0.9 %
10 SYRINGE (ML) INJECTION
Status: DISCONTINUED | OUTPATIENT
Start: 2025-01-01 | End: 2025-01-01 | Stop reason: HOSPADM

## 2025-01-01 RX ORDER — GLYCOPYRROLATE 0.2 MG/ML
0.3 INJECTION INTRAMUSCULAR; INTRAVENOUS EVERY 4 HOURS PRN
Status: DISCONTINUED | OUTPATIENT
Start: 2025-01-01 | End: 2025-01-01 | Stop reason: HOSPADM

## 2025-01-01 RX ORDER — SODIUM CHLORIDE 0.9 % (FLUSH) 0.9 %
10 SYRINGE (ML) INJECTION
OUTPATIENT
Start: 2025-01-01

## 2025-01-01 RX ADMIN — SENNOSIDES AND DOCUSATE SODIUM 1 TABLET: 50; 8.6 TABLET ORAL at 08:06

## 2025-01-01 RX ADMIN — PIPERACILLIN SODIUM AND TAZOBACTAM SODIUM 4.5 G: 4; .5 INJECTION, POWDER, FOR SOLUTION INTRAVENOUS at 01:06

## 2025-01-01 RX ADMIN — MUPIROCIN: 20 OINTMENT TOPICAL at 08:06

## 2025-01-01 RX ADMIN — GLYCOPYRROLATE 0.2 MG: 0.2 INJECTION, SOLUTION INTRAMUSCULAR; INTRAVENOUS at 07:07

## 2025-01-01 RX ADMIN — LORAZEPAM 2 MG: 2 INJECTION INTRAMUSCULAR; INTRAVENOUS at 11:07

## 2025-01-01 RX ADMIN — INSULIN ASPART 2 UNITS: 100 INJECTION, SOLUTION INTRAVENOUS; SUBCUTANEOUS at 01:06

## 2025-01-01 RX ADMIN — SODIUM CHLORIDE, POTASSIUM CHLORIDE, SODIUM LACTATE AND CALCIUM CHLORIDE: 600; 310; 30; 20 INJECTION, SOLUTION INTRAVENOUS at 01:06

## 2025-01-01 RX ADMIN — LEVETIRACETAM 750 MG: 500 INJECTION, SOLUTION, CONCENTRATE INTRAVENOUS at 08:07

## 2025-01-01 RX ADMIN — HYPROMELLOSE 2910 1 DROP: 5 SOLUTION/ DROPS OPHTHALMIC at 03:06

## 2025-01-01 RX ADMIN — MORPHINE SULFATE 2 MG: 2 INJECTION, SOLUTION INTRAMUSCULAR; INTRAVENOUS at 06:07

## 2025-01-01 RX ADMIN — SENNOSIDES AND DOCUSATE SODIUM 1 TABLET: 50; 8.6 TABLET ORAL at 09:06

## 2025-01-01 RX ADMIN — VANCOMYCIN HYDROCHLORIDE 1000 MG: 1 INJECTION, POWDER, LYOPHILIZED, FOR SOLUTION INTRAVENOUS at 10:06

## 2025-01-01 RX ADMIN — FENTANYL CITRATE 25 MCG: 50 INJECTION INTRAMUSCULAR; INTRAVENOUS at 07:06

## 2025-01-01 RX ADMIN — LORAZEPAM 1 MG: 2 INJECTION INTRAMUSCULAR; INTRAVENOUS at 01:06

## 2025-01-01 RX ADMIN — INSULIN ASPART 2 UNITS: 100 INJECTION, SOLUTION INTRAVENOUS; SUBCUTANEOUS at 05:06

## 2025-01-01 RX ADMIN — VANCOMYCIN HYDROCHLORIDE 1500 MG: 1.5 INJECTION, POWDER, LYOPHILIZED, FOR SOLUTION INTRAVENOUS at 11:06

## 2025-01-01 RX ADMIN — MORPHINE SULFATE 2 MG: 2 INJECTION, SOLUTION INTRAMUSCULAR; INTRAVENOUS at 07:07

## 2025-01-01 RX ADMIN — MUPIROCIN: 20 OINTMENT TOPICAL at 12:06

## 2025-01-01 RX ADMIN — LORAZEPAM 1 MG: 2 INJECTION INTRAMUSCULAR; INTRAVENOUS at 10:07

## 2025-01-01 RX ADMIN — ONDANSETRON 4 MG: 2 INJECTION INTRAMUSCULAR; INTRAVENOUS at 08:06

## 2025-01-01 RX ADMIN — INSULIN ASPART 4 UNITS: 100 INJECTION, SOLUTION INTRAVENOUS; SUBCUTANEOUS at 08:06

## 2025-01-01 RX ADMIN — APIXABAN 10 MG: 5 TABLET, FILM COATED ORAL at 12:06

## 2025-01-01 RX ADMIN — MORPHINE SULFATE 2 MG: 2 INJECTION, SOLUTION INTRAMUSCULAR; INTRAVENOUS at 11:06

## 2025-01-01 RX ADMIN — CLONAZEPAM 0.5 MG: 0.5 TABLET ORAL at 07:06

## 2025-01-01 RX ADMIN — PANTOPRAZOLE SODIUM 40 MG: 40 INJECTION, POWDER, LYOPHILIZED, FOR SOLUTION INTRAVENOUS at 09:06

## 2025-01-01 RX ADMIN — ACETAMINOPHEN 650 MG: 325 TABLET ORAL at 12:06

## 2025-01-01 RX ADMIN — LEVETIRACETAM 750 MG: 500 INJECTION, SOLUTION, CONCENTRATE INTRAVENOUS at 08:06

## 2025-01-01 RX ADMIN — MINERAL OIL AND WHITE PETROLATUM: 30; 940 OINTMENT OPHTHALMIC at 10:06

## 2025-01-01 RX ADMIN — LORAZEPAM 2 MG: 2 INJECTION INTRAMUSCULAR; INTRAVENOUS at 09:07

## 2025-01-01 RX ADMIN — MINERAL OIL AND WHITE PETROLATUM: 30; 940 OINTMENT OPHTHALMIC at 07:07

## 2025-01-01 RX ADMIN — FAMOTIDINE 20 MG: 10 INJECTION, SOLUTION INTRAVENOUS at 08:06

## 2025-01-01 RX ADMIN — CLONAZEPAM 0.5 MG: 0.5 TABLET ORAL at 08:06

## 2025-01-01 RX ADMIN — IOHEXOL 40 ML: 300 INJECTION, SOLUTION INTRAVENOUS at 10:06

## 2025-01-01 RX ADMIN — MINERAL OIL AND WHITE PETROLATUM 1 G: 30; 940 OINTMENT OPHTHALMIC at 10:07

## 2025-01-01 RX ADMIN — MINERAL OIL AND WHITE PETROLATUM: 30; 940 OINTMENT OPHTHALMIC at 05:06

## 2025-01-01 RX ADMIN — MIDAZOLAM HYDROCHLORIDE 2 MG: 1 INJECTION, SOLUTION INTRAMUSCULAR; INTRAVENOUS at 10:06

## 2025-01-01 RX ADMIN — PRAVASTATIN SODIUM 40 MG: 20 TABLET ORAL at 08:06

## 2025-01-01 RX ADMIN — HYPROMELLOSE 2910 1 DROP: 5 SOLUTION/ DROPS OPHTHALMIC at 04:06

## 2025-01-01 RX ADMIN — NOREPINEPHRINE BITARTRATE 0.16 MCG/KG/MIN: 4 INJECTION, SOLUTION INTRAVENOUS at 06:06

## 2025-01-01 RX ADMIN — SCOPOLAMINE 1 PATCH: 1.5 PATCH, EXTENDED RELEASE TRANSDERMAL at 01:07

## 2025-01-01 RX ADMIN — Medication 1 MG/HR: at 03:07

## 2025-01-01 RX ADMIN — INSULIN ASPART 1 UNITS: 100 INJECTION, SOLUTION INTRAVENOUS; SUBCUTANEOUS at 12:06

## 2025-01-01 RX ADMIN — LORAZEPAM 1 MG: 2 INJECTION INTRAMUSCULAR; INTRAVENOUS at 09:06

## 2025-01-01 RX ADMIN — MIDAZOLAM HYDROCHLORIDE 0.5 MG: 2 INJECTION, SOLUTION INTRAMUSCULAR; INTRAVENOUS at 08:06

## 2025-01-01 RX ADMIN — PIPERACILLIN SODIUM AND TAZOBACTAM SODIUM 4.5 G: 4; .5 INJECTION, POWDER, FOR SOLUTION INTRAVENOUS at 05:06

## 2025-01-01 RX ADMIN — LEVETIRACETAM 2500 MG: 500 INJECTION, SOLUTION, CONCENTRATE INTRAVENOUS at 10:06

## 2025-01-01 RX ADMIN — PROPOFOL 20 MCG/KG/MIN: 10 INJECTION, EMULSION INTRAVENOUS at 10:06

## 2025-01-01 RX ADMIN — PIPERACILLIN SODIUM AND TAZOBACTAM SODIUM 4.5 G: 4; .5 INJECTION, POWDER, FOR SOLUTION INTRAVENOUS at 04:06

## 2025-01-01 RX ADMIN — MORPHINE SULFATE 2 MG: 2 INJECTION, SOLUTION INTRAMUSCULAR; INTRAVENOUS at 12:06

## 2025-01-01 RX ADMIN — PIPERACILLIN SODIUM AND TAZOBACTAM SODIUM 4.5 G: 4; .5 INJECTION, POWDER, FOR SOLUTION INTRAVENOUS at 09:06

## 2025-01-01 RX ADMIN — ALTEPLASE 50 MG: KIT at 09:06

## 2025-01-01 RX ADMIN — INSULIN ASPART 1 UNITS: 100 INJECTION, SOLUTION INTRAVENOUS; SUBCUTANEOUS at 08:06

## 2025-01-01 RX ADMIN — FAMOTIDINE 20 MG: 10 INJECTION, SOLUTION INTRAVENOUS at 02:06

## 2025-01-01 RX ADMIN — MORPHINE SULFATE 2 MG: 2 INJECTION, SOLUTION INTRAMUSCULAR; INTRAVENOUS at 06:06

## 2025-01-01 RX ADMIN — NOREPINEPHRINE BITARTRATE 0.07 MCG/KG/MIN: 4 INJECTION, SOLUTION INTRAVENOUS at 05:06

## 2025-01-01 RX ADMIN — ACETAMINOPHEN 650 MG: 325 TABLET ORAL at 10:06

## 2025-01-01 RX ADMIN — KETOROLAC TROMETHAMINE 15 MG: 15 INJECTION INTRAMUSCULAR at 09:07

## 2025-01-01 RX ADMIN — HYPROMELLOSE 2910 1 DROP: 5 SOLUTION/ DROPS OPHTHALMIC at 09:06

## 2025-01-01 RX ADMIN — SODIUM CHLORIDE, POTASSIUM CHLORIDE, SODIUM LACTATE AND CALCIUM CHLORIDE: 600; 310; 30; 20 INJECTION, SOLUTION INTRAVENOUS at 02:06

## 2025-01-01 RX ADMIN — INSULIN ASPART 3 UNITS: 100 INJECTION, SOLUTION INTRAVENOUS; SUBCUTANEOUS at 06:06

## 2025-01-01 RX ADMIN — INSULIN ASPART 2 UNITS: 100 INJECTION, SOLUTION INTRAVENOUS; SUBCUTANEOUS at 04:06

## 2025-01-01 RX ADMIN — MORPHINE SULFATE 2 MG: 2 INJECTION, SOLUTION INTRAMUSCULAR; INTRAVENOUS at 10:06

## 2025-01-01 RX ADMIN — LORAZEPAM 1 MG: 2 INJECTION INTRAMUSCULAR; INTRAVENOUS at 04:06

## 2025-01-01 RX ADMIN — CALCIUM GLUCONATE 1 G: 20 INJECTION, SOLUTION INTRAVENOUS at 08:06

## 2025-01-01 RX ADMIN — GLYCOPYRROLATE 0.2 MG: 0.2 INJECTION, SOLUTION INTRAMUSCULAR; INTRAVENOUS at 02:07

## 2025-01-01 RX ADMIN — HYPROMELLOSE 2910 1 DROP: 5 SOLUTION/ DROPS OPHTHALMIC at 08:06

## 2025-01-01 RX ADMIN — GLYCOPYRROLATE 0.4 MG: 0.2 INJECTION, SOLUTION INTRAMUSCULAR; INTRAVENOUS at 01:06

## 2025-01-01 RX ADMIN — FENTANYL CITRATE 25 MCG: 50 INJECTION INTRAMUSCULAR; INTRAVENOUS at 04:06

## 2025-01-01 RX ADMIN — LEVETIRACETAM 750 MG: 500 INJECTION, SOLUTION, CONCENTRATE INTRAVENOUS at 09:06

## 2025-01-01 RX ADMIN — ACETAMINOPHEN 650 MG: 325 TABLET ORAL at 08:06

## 2025-01-01 RX ADMIN — INSULIN HUMAN 10 UNITS: 100 INJECTION, SOLUTION PARENTERAL at 07:06

## 2025-01-01 RX ADMIN — NOREPINEPHRINE BITARTRATE 0.04 MCG/KG/MIN: 4 INJECTION, SOLUTION INTRAVENOUS at 08:06

## 2025-01-01 RX ADMIN — POLYETHYLENE GLYCOL 3350 17 G: 17 POWDER, FOR SOLUTION ORAL at 08:06

## 2025-01-01 RX ADMIN — GLYCOPYRROLATE 0.2 MG: 0.2 INJECTION, SOLUTION INTRAMUSCULAR; INTRAVENOUS at 05:06

## 2025-01-01 RX ADMIN — LORAZEPAM 1 MG: 2 INJECTION INTRAMUSCULAR; INTRAVENOUS at 05:06

## 2025-01-01 RX ADMIN — FENTANYL CITRATE 25 MCG: 50 INJECTION, SOLUTION INTRAMUSCULAR; INTRAVENOUS at 08:06

## 2025-01-01 RX ADMIN — INSULIN ASPART 1 UNITS: 100 INJECTION, SOLUTION INTRAVENOUS; SUBCUTANEOUS at 11:06

## 2025-01-01 RX ADMIN — SCOPOLAMINE 1 PATCH: 1.5 PATCH, EXTENDED RELEASE TRANSDERMAL at 12:06

## 2025-01-01 RX ADMIN — FAMOTIDINE 20 MG: 10 INJECTION, SOLUTION INTRAVENOUS at 10:06

## 2025-01-01 RX ADMIN — MINERAL OIL AND WHITE PETROLATUM: 30; 940 OINTMENT OPHTHALMIC at 09:07

## 2025-01-01 RX ADMIN — SODIUM CHLORIDE, POTASSIUM CHLORIDE, SODIUM LACTATE AND CALCIUM CHLORIDE: 600; 310; 30; 20 INJECTION, SOLUTION INTRAVENOUS at 11:06

## 2025-01-01 RX ADMIN — INSULIN ASPART 2 UNITS: 100 INJECTION, SOLUTION INTRAVENOUS; SUBCUTANEOUS at 09:06

## 2025-01-01 RX ADMIN — EPINEPHRINE 1 MG: 0.1 INJECTION INTRAVENOUS at 09:06

## 2025-01-01 RX ADMIN — ACETAMINOPHEN 650 MG: 325 TABLET ORAL at 09:06

## 2025-01-01 RX ADMIN — PIPERACILLIN SODIUM AND TAZOBACTAM SODIUM 4.5 G: 4; .5 INJECTION, POWDER, FOR SOLUTION INTRAVENOUS at 08:06

## 2025-01-01 RX ADMIN — SILODOSIN 4 MG: 4 CAPSULE ORAL at 08:06

## 2025-01-01 RX ADMIN — MINERAL OIL AND WHITE PETROLATUM: 30; 940 OINTMENT OPHTHALMIC at 06:07

## 2025-01-01 RX ADMIN — MORPHINE SULFATE 2 MG: 2 INJECTION, SOLUTION INTRAMUSCULAR; INTRAVENOUS at 02:06

## 2025-01-01 RX ADMIN — MORPHINE SULFATE 2 MG: 2 INJECTION, SOLUTION INTRAMUSCULAR; INTRAVENOUS at 04:07

## 2025-01-01 RX ADMIN — SILODOSIN 4 MG: 4 CAPSULE ORAL at 09:06

## 2025-01-01 RX ADMIN — PANTOPRAZOLE SODIUM 40 MG: 40 INJECTION, POWDER, LYOPHILIZED, FOR SOLUTION INTRAVENOUS at 12:06

## 2025-01-01 RX ADMIN — NOREPINEPHRINE BITARTRATE 0.22 MCG/KG/MIN: 4 INJECTION, SOLUTION INTRAVENOUS at 12:06

## 2025-01-01 RX ADMIN — GLYCOPYRROLATE 0.4 MG: 0.2 INJECTION, SOLUTION INTRAMUSCULAR; INTRAVENOUS at 09:06

## 2025-01-01 RX ADMIN — MUPIROCIN: 20 OINTMENT TOPICAL at 09:06

## 2025-01-01 RX ADMIN — ACETAMINOPHEN 650 MG: 325 TABLET ORAL at 05:06

## 2025-01-01 RX ADMIN — PIPERACILLIN SODIUM AND TAZOBACTAM SODIUM 4.5 G: 4; .5 INJECTION, POWDER, FOR SOLUTION INTRAVENOUS at 10:06

## 2025-01-01 RX ADMIN — PRAVASTATIN SODIUM 40 MG: 20 TABLET ORAL at 09:06

## 2025-01-01 RX ADMIN — PANTOPRAZOLE SODIUM 40 MG: 40 INJECTION, POWDER, LYOPHILIZED, FOR SOLUTION INTRAVENOUS at 08:06

## 2025-01-01 RX ADMIN — PIPERACILLIN SODIUM AND TAZOBACTAM SODIUM 4.5 G: 4; .5 INJECTION, POWDER, FOR SOLUTION INTRAVENOUS at 12:06

## 2025-01-01 RX ADMIN — PRAVASTATIN SODIUM 40 MG: 40 TABLET ORAL at 09:06

## 2025-01-01 RX ADMIN — DIPHENHYDRAMINE HYDROCHLORIDE 25 MG: 50 INJECTION, SOLUTION INTRAMUSCULAR; INTRAVENOUS at 11:06

## 2025-01-01 RX ADMIN — POLYETHYLENE GLYCOL 3350 17 G: 17 POWDER, FOR SOLUTION ORAL at 09:06

## 2025-01-01 RX ADMIN — SCOPOLAMINE 1 PATCH: 1.5 PATCH, EXTENDED RELEASE TRANSDERMAL at 11:06

## 2025-01-01 RX ADMIN — MORPHINE SULFATE 2 MG: 2 INJECTION, SOLUTION INTRAMUSCULAR; INTRAVENOUS at 09:06

## 2025-01-01 RX ADMIN — CALCIUM GLUCONATE 1 G: 20 INJECTION, SOLUTION INTRAVENOUS at 10:06

## 2025-01-01 RX ADMIN — GLYCOPYRROLATE 0.2 MG: 0.2 INJECTION, SOLUTION INTRAMUSCULAR; INTRAVENOUS at 06:06

## 2025-01-01 RX ADMIN — ONDANSETRON 4 MG: 2 INJECTION INTRAMUSCULAR; INTRAVENOUS at 07:06

## 2025-01-01 RX ADMIN — ARGATROBAN 0.5 MCG/KG/MIN: 250 INJECTION, SOLUTION INTRAVENOUS at 06:06

## 2025-01-01 RX ADMIN — ACETAMINOPHEN 650 MG: 325 TABLET ORAL at 07:06

## 2025-01-01 RX ADMIN — SODIUM CHLORIDE: 9 INJECTION, SOLUTION INTRAVENOUS at 12:06

## 2025-01-01 RX ADMIN — LORAZEPAM 2 MG: 2 INJECTION INTRAMUSCULAR; INTRAVENOUS at 07:07

## 2025-01-01 RX ADMIN — MORPHINE SULFATE 2 MG: 2 INJECTION, SOLUTION INTRAMUSCULAR; INTRAVENOUS at 04:06

## 2025-01-01 RX ADMIN — MINERAL OIL AND WHITE PETROLATUM: 30; 940 OINTMENT OPHTHALMIC at 01:07

## 2025-01-01 RX ADMIN — MIDAZOLAM 2 MG: 1 INJECTION INTRAMUSCULAR; INTRAVENOUS at 10:06

## 2025-01-01 RX ADMIN — INSULIN ASPART 4 UNITS: 100 INJECTION, SOLUTION INTRAVENOUS; SUBCUTANEOUS at 03:06

## 2025-01-01 RX ADMIN — GADOBUTROL 10 ML: 604.72 INJECTION INTRAVENOUS at 05:06

## 2025-01-01 RX ADMIN — MORPHINE SULFATE 2 MG: 2 INJECTION, SOLUTION INTRAMUSCULAR; INTRAVENOUS at 03:06

## 2025-01-01 RX ADMIN — SCOLOPAMINE TRANSDERMAL SYSTEM 1 PATCH: 1 PATCH, EXTENDED RELEASE TRANSDERMAL at 12:06

## 2025-01-01 RX ADMIN — DROPERIDOL 2.5 MG: 2.5 INJECTION, SOLUTION INTRAMUSCULAR; INTRAVENOUS at 11:06

## 2025-01-01 RX ADMIN — PRAVASTATIN SODIUM 40 MG: 40 TABLET ORAL at 11:06

## 2025-01-01 RX ADMIN — MINERAL OIL AND WHITE PETROLATUM: 30; 940 OINTMENT OPHTHALMIC at 09:06

## 2025-01-01 RX ADMIN — GLYCOPYRROLATE 0.4 MG: 0.2 INJECTION, SOLUTION INTRAMUSCULAR; INTRAVENOUS at 07:06

## 2025-01-01 RX ADMIN — LORAZEPAM 2 MG: 2 INJECTION INTRAMUSCULAR; INTRAVENOUS at 08:07

## 2025-01-01 RX ADMIN — GLYCOPYRROLATE 0.2 MG: 0.2 INJECTION, SOLUTION INTRAMUSCULAR; INTRAVENOUS at 04:07

## 2025-01-01 RX ADMIN — GLYCOPYRROLATE 0.2 MG: 0.2 INJECTION, SOLUTION INTRAMUSCULAR; INTRAVENOUS at 06:07

## 2025-01-01 RX ADMIN — ARGATROBAN 2 MCG/KG/MIN: 250 INJECTION, SOLUTION INTRAVENOUS at 03:06

## 2025-01-01 RX ADMIN — INSULIN ASPART 1 UNITS: 100 INJECTION, SOLUTION INTRAVENOUS; SUBCUTANEOUS at 07:06

## 2025-01-01 RX ADMIN — HEPARIN SODIUM AND DEXTROSE 12 UNITS/KG/HR: 10000; 5 INJECTION INTRAVENOUS at 12:06

## 2025-01-01 RX ADMIN — SENNOSIDES AND DOCUSATE SODIUM 1 TABLET: 50; 8.6 TABLET ORAL at 11:06

## 2025-01-01 RX ADMIN — DEXTROSE MONOHYDRATE 25 G: 25 INJECTION, SOLUTION INTRAVENOUS at 07:06

## 2025-01-01 RX ADMIN — ARGATROBAN 1 MCG/KG/MIN: 250 INJECTION, SOLUTION INTRAVENOUS at 04:06

## 2025-01-01 RX ADMIN — Medication 1 MG/HR: at 01:06

## 2025-01-01 RX ADMIN — FENTANYL CITRATE 25 MCG: 50 INJECTION INTRAMUSCULAR; INTRAVENOUS at 09:06

## 2025-01-01 RX ADMIN — MORPHINE SULFATE 2 MG: 2 INJECTION, SOLUTION INTRAMUSCULAR; INTRAVENOUS at 08:06

## 2025-01-01 RX ADMIN — Medication 50 MCG/HR: at 12:06

## 2025-01-01 RX ADMIN — INSULIN ASPART 2 UNITS: 100 INJECTION, SOLUTION INTRAVENOUS; SUBCUTANEOUS at 12:06

## 2025-01-01 RX ADMIN — SODIUM CHLORIDE: 9 INJECTION, SOLUTION INTRAVENOUS at 02:06

## 2025-01-01 RX ADMIN — LORAZEPAM 1 MG: 2 INJECTION INTRAMUSCULAR; INTRAVENOUS at 06:06

## 2025-01-01 RX ADMIN — POLYETHYLENE GLYCOL 3350 17 G: 17 POWDER, FOR SOLUTION ORAL at 11:06

## 2025-01-01 RX ADMIN — FENTANYL CITRATE 50 MCG: 50 INJECTION, SOLUTION INTRAMUSCULAR; INTRAVENOUS at 08:06

## 2025-01-01 RX ADMIN — HUMAN ALBUMIN MICROSPHERES AND PERFLUTREN 0.66 MG: 10; .22 INJECTION, SOLUTION INTRAVENOUS at 11:06

## 2025-01-01 RX ADMIN — ARGATROBAN 2 MCG/KG/MIN: 250 INJECTION, SOLUTION INTRAVENOUS at 02:06

## 2025-01-01 RX ADMIN — ARGATROBAN 0.5 MCG/KG/MIN: 250 INJECTION, SOLUTION INTRAVENOUS at 10:06

## 2025-01-01 RX ADMIN — ACETAMINOPHEN 650 MG: 325 TABLET ORAL at 03:06

## 2025-01-01 RX ADMIN — MINERAL OIL AND WHITE PETROLATUM: 30; 940 OINTMENT OPHTHALMIC at 01:06

## 2025-01-01 RX ADMIN — TAMSULOSIN HYDROCHLORIDE 0.8 MG: 0.4 CAPSULE ORAL at 09:06

## 2025-01-01 RX ADMIN — IOHEXOL 100 ML: 350 INJECTION, SOLUTION INTRAVENOUS at 11:06

## 2025-01-01 RX ADMIN — MINERAL OIL AND WHITE PETROLATUM: 30; 940 OINTMENT OPHTHALMIC at 10:07

## 2025-01-01 RX ADMIN — MINERAL OIL AND WHITE PETROLATUM: 30; 940 OINTMENT OPHTHALMIC at 02:07

## 2025-01-01 RX ADMIN — MINERAL OIL AND WHITE PETROLATUM: 30; 940 OINTMENT OPHTHALMIC at 02:06

## 2025-01-01 RX ADMIN — MORPHINE SULFATE 2 MG: 2 INJECTION, SOLUTION INTRAMUSCULAR; INTRAVENOUS at 07:06

## 2025-01-01 RX ADMIN — FENTANYL CITRATE 25 MCG: 50 INJECTION, SOLUTION INTRAMUSCULAR; INTRAVENOUS at 09:06

## 2025-01-01 RX ADMIN — NOREPINEPHRINE BITARTRATE 0.2 MCG/KG/MIN: 1 INJECTION, SOLUTION, CONCENTRATE INTRAVENOUS at 09:06

## 2025-01-01 RX ADMIN — MUPIROCIN: 20 OINTMENT TOPICAL at 10:06

## 2025-01-01 RX ADMIN — ACETAMINOPHEN 650 MG: 325 TABLET ORAL at 11:06

## 2025-01-01 RX ADMIN — SILODOSIN 4 MG: 4 CAPSULE ORAL at 11:06

## 2025-01-01 RX ADMIN — FENTANYL CITRATE 25 MCG: 50 INJECTION INTRAMUSCULAR; INTRAVENOUS at 11:06

## 2025-02-24 ENCOUNTER — PATIENT MESSAGE (OUTPATIENT)
Dept: RADIATION ONCOLOGY | Facility: CLINIC | Age: 80
End: 2025-02-24
Payer: MEDICARE

## 2025-03-20 ENCOUNTER — OFFICE VISIT (OUTPATIENT)
Dept: URGENT CARE | Facility: CLINIC | Age: 80
End: 2025-03-20
Payer: MEDICARE

## 2025-03-20 VITALS
TEMPERATURE: 98 F | WEIGHT: 238 LBS | OXYGEN SATURATION: 96 % | RESPIRATION RATE: 18 BRPM | BODY MASS INDEX: 32.28 KG/M2 | DIASTOLIC BLOOD PRESSURE: 74 MMHG | SYSTOLIC BLOOD PRESSURE: 126 MMHG | HEART RATE: 90 BPM

## 2025-03-20 DIAGNOSIS — Z91.09 ENVIRONMENTAL ALLERGIES: ICD-10-CM

## 2025-03-20 DIAGNOSIS — R05.9 COUGH, UNSPECIFIED TYPE: ICD-10-CM

## 2025-03-20 DIAGNOSIS — R09.82 ALLERGIC RHINITIS WITH POSTNASAL DRIP: Primary | ICD-10-CM

## 2025-03-20 DIAGNOSIS — J30.9 ALLERGIC RHINITIS WITH POSTNASAL DRIP: Primary | ICD-10-CM

## 2025-03-20 LAB
CTP QC/QA: YES
SARS CORONAVIRUS 2 ANTIGEN: NEGATIVE

## 2025-03-20 PROCEDURE — 99213 OFFICE O/P EST LOW 20 MIN: CPT | Mod: S$GLB,,, | Performed by: FAMILY MEDICINE

## 2025-03-20 PROCEDURE — 87811 SARS-COV-2 COVID19 W/OPTIC: CPT | Mod: QW,S$GLB,, | Performed by: FAMILY MEDICINE

## 2025-03-20 RX ORDER — LEVOCETIRIZINE DIHYDROCHLORIDE 5 MG/1
5 TABLET, FILM COATED ORAL NIGHTLY
Qty: 30 TABLET | Refills: 11 | Status: SHIPPED | OUTPATIENT
Start: 2025-03-20 | End: 2026-03-20

## 2025-03-20 RX ORDER — DEXAMETHASONE 4 MG/1
8 TABLET ORAL DAILY
Qty: 6 TABLET | Refills: 0 | Status: SHIPPED | OUTPATIENT
Start: 2025-03-20 | End: 2025-03-23

## 2025-03-20 NOTE — PROGRESS NOTES
Subjective:      Patient ID: Franko Smith is a 79 y.o. male.    Vitals:  weight is 108 kg (238 lb). His oral temperature is 98.1 °F (36.7 °C). His blood pressure is 126/74 and his pulse is 90. His respiration is 18 and oxygen saturation is 96%.     Chief Complaint: Cough    This is a 79 y.o. male who presents today with a chief complaint of  cough, stomach, ache, headaches that has been going on since Sunday.    Cough  This is a new problem. The current episode started in the past 7 days. The problem has been unchanged. The problem occurs constantly. Associated symptoms include headaches, nasal congestion, postnasal drip and a sore throat. Pertinent negatives include no chest pain, chills, ear pain, fever, heartburn, hemoptysis, myalgias, rash, rhinorrhea, shortness of breath, sweats, weight loss or wheezing. Nothing aggravates the symptoms. He has tried nothing for the symptoms. The treatment provided no relief.       Constitution: Negative for chills and fever.   HENT:  Positive for postnasal drip and sore throat. Negative for ear pain.    Cardiovascular:  Negative for chest pain.   Respiratory:  Positive for cough. Negative for bloody sputum, shortness of breath and wheezing.    Gastrointestinal:  Negative for heartburn.   Musculoskeletal:  Negative for muscle ache.   Skin:  Negative for rash.   Neurological:  Positive for headaches.      Objective:     Physical Exam   Constitutional: He is oriented to person, place, and time. He appears well-developed. He is cooperative.   HENT:   Head: Normocephalic and atraumatic.   Ears:   Right Ear: Hearing, tympanic membrane, external ear and ear canal normal. No swelling. No mastoid tenderness. Tympanic membrane is not erythematous.   Left Ear: Hearing, tympanic membrane, external ear and ear canal normal. No swelling. No mastoid tenderness. Tympanic membrane is not erythematous.   Nose: Nose normal. No mucosal edema or nasal deformity. No epistaxis. Right sinus  exhibits no maxillary sinus tenderness and no frontal sinus tenderness. Left sinus exhibits no maxillary sinus tenderness and no frontal sinus tenderness.   Mouth/Throat: Uvula is midline, oropharynx is clear and moist and mucous membranes are normal. No trismus in the jaw. Normal dentition. No uvula swelling. No oropharyngeal exudate. Tonsils are 1+ on the right. Tonsils are 1+ on the left. No tonsillar exudate.   Eyes: Conjunctivae and lids are normal. Right eye exhibits chemosis. Left eye exhibits chemosis. Right conjunctiva is not injected. Left conjunctiva is not injected. periorbital hyperpigmentation   Neck: Trachea normal and phonation normal. Neck supple.   Cardiovascular: Normal rate, regular rhythm, normal heart sounds and normal pulses.   Pulmonary/Chest: Effort normal and breath sounds normal.   Abdominal: Normal appearance and bowel sounds are normal. Soft.   Musculoskeletal: Normal range of motion.         General: Normal range of motion.   Neurological: He is alert and oriented to person, place, and time. He exhibits normal muscle tone.   Skin: Skin is warm, dry and intact.   Psychiatric: His speech is normal and behavior is normal. Judgment and thought content normal.   Nursing note and vitals reviewed.      Assessment:     1. Allergic rhinitis with postnasal drip    2. Cough, unspecified type    3. Environmental allergies        Plan:       Allergic rhinitis with postnasal drip  -     dexAMETHasone (DECADRON) 4 MG Tab; Take 2 tablets (8 mg total) by mouth once daily. for 3 doses  Dispense: 6 tablet; Refill: 0    Cough, unspecified type  -     SARS Coronavirus 2 Antigen, POCT Manual Read    Environmental allergies  -     dexAMETHasone (DECADRON) 4 MG Tab; Take 2 tablets (8 mg total) by mouth once daily. for 3 doses  Dispense: 6 tablet; Refill: 0  -     levocetirizine (XYZAL) 5 MG tablet; Take 1 tablet (5 mg total) by mouth every evening.  Dispense: 30 tablet; Refill: 11        Thank you for choosing  [facility]!     Our goal in the Urgent Care is to always provide outstanding medical care. You may receive a survey by mail or e-mail in the next week regarding your experience today. We would greatly appreciate you completing and returning the survey. Your feedback provides us with a way to recognize our staff who provide very good care, and it helps us learn how to improve when your experience was below our aspiration of excellence.       We appreciate you trusting us with your medical care. We hope you feel better soon. We will be happy to take care of you for all of your future medical needs.  You must understand that you've received an Urgent Care treatment only and that you may be released before all your medical problems are known or treated. You, the patient, will arrange for follow up care as instructed.  Follow up with your PCP or specialty clinic as directed in the next 1-2 weeks if not improved or as needed.  You can call [phone] to schedule an appointment with the appropriate provider.  Another option is to follow up with [facility] ([email or web address]) virtually for quick simple medical advice.  If your condition worsens we recommend that you receive another evaluation at the emergency room immediately or contact your primary medical clinics after hours call service to discuss your concerns.  Please return here or go to the Emergency Department for any concerns or worsening of condition.      *If you were prescribed a narcotic or controlled medication, do not drive or operate heavy equipment or machinery while taking these medications.

## 2025-03-25 ENCOUNTER — LAB VISIT (OUTPATIENT)
Dept: LAB | Facility: OTHER | Age: 80
End: 2025-03-25
Attending: RADIOLOGY
Payer: MEDICARE

## 2025-03-25 DIAGNOSIS — C61 PROSTATE CANCER: ICD-10-CM

## 2025-03-25 LAB — PSA SERPL-MCNC: <0.01 NG/ML

## 2025-03-25 PROCEDURE — 36415 COLL VENOUS BLD VENIPUNCTURE: CPT

## 2025-03-25 PROCEDURE — 84153 ASSAY OF PSA TOTAL: CPT

## 2025-04-07 ENCOUNTER — OFFICE VISIT (OUTPATIENT)
Dept: RADIATION ONCOLOGY | Facility: CLINIC | Age: 80
End: 2025-04-07
Attending: RADIOLOGY
Payer: MEDICARE

## 2025-04-07 VITALS
WEIGHT: 236.38 LBS | HEIGHT: 72 IN | BODY MASS INDEX: 32.02 KG/M2 | SYSTOLIC BLOOD PRESSURE: 114 MMHG | DIASTOLIC BLOOD PRESSURE: 60 MMHG | HEART RATE: 97 BPM

## 2025-04-07 DIAGNOSIS — C61 PROSTATE CANCER: Primary | ICD-10-CM

## 2025-04-07 PROCEDURE — 1160F RVW MEDS BY RX/DR IN RCRD: CPT | Mod: CPTII,S$GLB,, | Performed by: RADIOLOGY

## 2025-04-07 PROCEDURE — 3078F DIAST BP <80 MM HG: CPT | Mod: CPTII,S$GLB,, | Performed by: RADIOLOGY

## 2025-04-07 PROCEDURE — 1159F MED LIST DOCD IN RCRD: CPT | Mod: CPTII,S$GLB,, | Performed by: RADIOLOGY

## 2025-04-07 PROCEDURE — 3288F FALL RISK ASSESSMENT DOCD: CPT | Mod: CPTII,S$GLB,, | Performed by: RADIOLOGY

## 2025-04-07 PROCEDURE — 99212 OFFICE O/P EST SF 10 MIN: CPT | Mod: S$GLB,,, | Performed by: RADIOLOGY

## 2025-04-07 PROCEDURE — 99999 PR PBB SHADOW E&M-EST. PATIENT-LVL IV: CPT | Mod: PBBFAC,,, | Performed by: RADIOLOGY

## 2025-04-07 PROCEDURE — 1126F AMNT PAIN NOTED NONE PRSNT: CPT | Mod: CPTII,S$GLB,, | Performed by: RADIOLOGY

## 2025-04-07 PROCEDURE — 3074F SYST BP LT 130 MM HG: CPT | Mod: CPTII,S$GLB,, | Performed by: RADIOLOGY

## 2025-04-07 PROCEDURE — 1101F PT FALLS ASSESS-DOCD LE1/YR: CPT | Mod: CPTII,S$GLB,, | Performed by: RADIOLOGY

## 2025-04-07 RX ORDER — MICONAZOLE NITRATE 2 G/100G
POWDER TOPICAL
COMMUNITY
Start: 2024-06-17

## 2025-04-07 RX ORDER — DICLOFENAC SODIUM 10 MG/G
GEL TOPICAL
COMMUNITY
Start: 2025-01-03

## 2025-04-07 RX ORDER — LIDOCAINE 50 MG/G
PATCH TOPICAL
COMMUNITY
Start: 2024-06-17

## 2025-04-07 RX ORDER — CLONAZEPAM 0.5 MG/1
1 TABLET ORAL DAILY PRN
COMMUNITY
Start: 2025-02-11

## 2025-04-07 NOTE — PROGRESS NOTES
[facility] - Radiation Oncology     Patient ID: Franko Smith is a 79 y.o. male.    Chief Complaint: No chief complaint on file.      Mr. 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  He initially presented for evaluation of increased PSA velocity.  PSA in February of 2023 was 3.38 ng/ml.  MRI in May of 2023 revealed a 54 cc prostate with a 1.4 cm PI-RADS 4 lesion in the Rt. mid lateral peripheral zone without extraprostatic extension.  The seminal vesicles and neurovascular bundles were unremarkable.  There was no adenopathy.  Biopsies on 7/25/23 revealed Mode 8 (3+5) adenocarcinoma involving 25% of the core from the Rt. medial base.  Thre was [place] 7 (4+3) disease at the #4 BOB.  [place] 7 (3+4) adenocarcinoma involved 10 - 85% of the cores from the Rt. lateral base, Rt. lateral mid gland, Rt. medial mid gland, and #3 BOB.  There was Mode 6 (3+3) adenocarcinoma involving cores from the Rt. lateral apex. Overall tumor involved 7/16 sites and 14/24 cores.  PSMA scan revealed focal uptake in the prostate with no evidence of regional or distant metastatic disease.  The patient began  hormonal therapy and completed radiotherapy to the prostate, seminal vesicles and pelvic nodes on 1/25/24. He completed 18 months of Lupron therapy, last injection in October of 2024. Today the patient complains of fatigue.       Review of Systems   Constitutional:  Positive for fatigue. Negative for activity change, appetite change, chills and fever.   Gastrointestinal:  Negative for constipation, diarrhea and fecal incontinence.   Genitourinary:  Positive for erectile dysfunction and frequency. Negative for bladder incontinence, difficulty urinating and dysuria.       Physical Exam  Constitutional:       General: He is not in acute distress.     Appearance: Normal appearance.   Neurological:      Mental Status: He is alert and oriented to person, place, and  time.   Psychiatric:         Mood and Affect: Mood normal.         Judgment: Judgment normal.        Latest Reference Range & Units 09/16/24 10:00 03/25/25 11:26   Prostate Specific Antigen <=4.00 ng/mL  <0.01   PSA Diagnostic 0.00 - 4.00 ng/mL 0.01           Assessment and Plan    Prostate cancer - no evidence of disease progression or recurrence.  Plan follow up in 6 months with PSA and testosterone.

## 2025-05-10 ENCOUNTER — PATIENT MESSAGE (OUTPATIENT)
Dept: UROLOGY | Facility: CLINIC | Age: 80
End: 2025-05-10
Payer: MEDICARE

## 2025-05-12 DIAGNOSIS — C61 PROSTATE CANCER: Primary | ICD-10-CM

## 2025-05-13 RX ORDER — MEGESTROL ACETATE 40 MG/1
40 TABLET ORAL DAILY
Qty: 30 TABLET | Refills: 11 | Status: SHIPPED | OUTPATIENT
Start: 2025-05-13 | End: 2026-05-13

## 2025-06-15 ENCOUNTER — HOSPITAL ENCOUNTER (OUTPATIENT)
Dept: RADIOLOGY | Facility: HOSPITAL | Age: 80
Discharge: HOME | End: 2025-06-15
Payer: MEDICARE

## 2025-06-15 PROCEDURE — 74018 RADEX ABDOMEN 1 VIEW: CPT | Mod: 26,,, | Performed by: RADIOLOGY

## 2025-06-18 ENCOUNTER — HOSPITAL ENCOUNTER (OUTPATIENT)
Dept: RADIOLOGY | Facility: HOSPITAL | Age: 80
Discharge: HOME | End: 2025-06-18
Payer: MEDICARE

## 2025-06-18 ENCOUNTER — HOSPITAL ENCOUNTER (OUTPATIENT)
Dept: RADIOLOGY | Facility: HOSPITAL | Age: 80
Discharge: HOME | End: 2025-06-18
Attending: PHYSICAL MEDICINE & REHABILITATION
Payer: MEDICARE

## 2025-06-18 PROCEDURE — 93971 EXTREMITY STUDY: CPT | Mod: 26,RT,, | Performed by: RADIOLOGY

## 2025-06-21 PROBLEM — I82.409 DEEP VEIN THROMBOSIS (DVT) OF LOWER EXTREMITY: Status: ACTIVE | Noted: 2025-01-01

## 2025-06-21 PROBLEM — R20.2 NUMBNESS AND TINGLING OF RIGHT UPPER EXTREMITY: Status: ACTIVE | Noted: 2025-06-21

## 2025-06-21 PROBLEM — I61.9 ICH (INTRACEREBRAL HEMORRHAGE): Status: ACTIVE | Noted: 2025-01-01

## 2025-06-21 PROBLEM — R20.0 NUMBNESS AND TINGLING OF RIGHT UPPER EXTREMITY: Status: ACTIVE | Noted: 2025-06-21

## 2025-06-21 NOTE — ASSESSMENT & PLAN NOTE
-Vascular disease risk factor.  -Current SBP goal 130-150  -Patients blood pressure range in the last 24 hours was: BP  Min: 106/55  Max: 130/75.The patient's inpatient anti-hypertensive regimen is listed below:  Current Antihypertensives  labetalol 20 mg/4 mL (5 mg/mL) IV syring, Every 4 hours PRN, Intravenous  hydrALAZINE injection 10 mg, Every 4 hours PRN, Intravenous    Plan  - BP is controlled, no changes needed to their regimen

## 2025-06-21 NOTE — ED PROVIDER NOTES
Encounter Date: 6/20/2025       History     Chief Complaint   Patient presents with    Vomiting     Pt arrives via ems from [facility] for vomiting. Per nurses only one episode of emesis. Hx of stroke with deficits to right side.     Pt is a 78yo w/ past medical history of PE and recent hemorrhagic stroke presenting due to persistent nausea and vomiting since being discharged from the hospital.  Patient was recently admitted to the hospital for 2 weeks after found to have a hemorrhagic stroke.  Discharge to rehab facility.  Reports that each day he has been at the rehab facility he has vomited once and has had persistent nausea, decreased appetite.  Reports that he has been able to participate in physical therapy every day.  States that he feels like he has had a modest improvement since starting therapy.  States that he has continued right-sided weakness and right arm numbness.  States that he began having a headache today.  Low grade 4/10.  Denies cough, fevers, nasal congestion, chest pain, SOB.  Reports that he was put on low-dose heparin when he arrived at the rehab facility, that was discontinued 2 days ago.        Review of patient's allergies indicates:   Allergen Reactions    Ether for anesthesia Nausea And Vomiting     Past Medical History:   Diagnosis Date    Anxiety     Diabetes mellitus, type 2     GERD (gastroesophageal reflux disease)     Hyperlipidemia     Hypertension     Obesity (BMI 30-39.9)      Past Surgical History:   Procedure Laterality Date    HERNIA REPAIR      ULNAR NERVE REPAIR Left      Family History   Problem Relation Name Age of Onset    Hypertension Mother      Hyperlipidemia Mother      Cancer Father      Diabetes Sister      Diabetes Brother      Stroke Neg Hx      Heart disease Neg Hx       Social History[1]  Review of Systems  Per HPI  Physical Exam     Initial Vitals [06/20/25 1758]   BP Pulse Resp Temp SpO2   116/62 75 (!) 22 98.6 °F (37 °C) 99 %      MAP       --          Physical Exam    Constitutional: He appears well-developed and well-nourished. He is not diaphoretic. No distress.   Eyes: Conjunctivae and EOM are normal. Pupils are equal, round, and reactive to light. Right eye exhibits no discharge. Left eye exhibits no discharge.   Horizontal nystagmus on exam, non fatigable.   Cardiovascular:  Normal rate, regular rhythm and normal heart sounds.           No murmur heard.  Pulmonary/Chest: Breath sounds normal. No stridor. No respiratory distress. He has no wheezes. He has no rhonchi. He has no rales.   Abdominal: Abdomen is soft. He exhibits no distension. There is no abdominal tenderness. There is no rebound and no guarding.   Musculoskeletal:         General: No tenderness or edema.     Neurological: He is alert. GCS score is 15. GCS eye subscore is 4. GCS verbal subscore is 5. GCS motor subscore is 6.   Tongue midline.  Symmetric smile.  Symmetric eyebrow raise.  Numbness in right arm.  Bilateral 5+ strength in upper extremities.  Dorsiflexion plantar flexion 5+ bilaterally   Skin: Skin is warm and dry.   Mild blood spotting on gauze located on patient's right groin where patient had previous vascular access.  No significant swelling, erythema or tenderness.   Psychiatric: He has a normal mood and affect.         ED Course   Procedures  Labs Reviewed   COMPREHENSIVE METABOLIC PANEL - Abnormal       Result Value    Sodium 135 (*)     Potassium 4.7      Chloride 104      CO2 19 (*)     Glucose 108      BUN 28 (*)     Creatinine 1.6 (*)     Calcium 8.9      Protein Total 7.5      Albumin 4.0      Bilirubin Total 1.4 (*)     ALP 77      AST 15      ALT 16      Anion Gap 12      eGFR 43 (*)    URINALYSIS, REFLEX TO URINE CULTURE - Abnormal    Color, UA Yellow      Appearance, UA Clear      pH, UA 6.0      Spec Grav UA >=1.030 (*)     Protein, UA Trace (*)     Glucose, UA Negative      Ketones, UA Negative      Bilirubin, UA Negative      Blood, UA Negative      Nitrites, UA  Negative      Urobilinogen, UA Negative      Leukocyte Esterase, UA Negative     CBC WITH DIFFERENTIAL - Abnormal    WBC 6.92      RBC 3.23 (*)     HGB 9.0 (*)     HCT 28.3 (*)     MCV 88      MCH 27.9      MCHC 31.8 (*)     RDW 15.9 (*)     Platelet Count 128 (*)     MPV 12.3      Nucleated RBC 0      Neut % 70.0      Lymph % 15.0 (*)     Mono % 12.3      Eos % 1.9      Basophil % 0.4      Imm Grans % 0.4      Neut # 4.84      Lymph # 1.04      Mono # 0.85      Eos # 0.13      Baso # 0.03      Imm Grans # 0.03     PROTIME-INR - Abnormal    PT 14.2 (*)     INR 1.3 (*)    ISTAT PROCEDURE - Abnormal    POC Glucose 111 (*)     POC BUN 35 (*)     POC Creatinine 1.9 (*)     POC Sodium 135 (*)     POC Potassium 4.7      POC Chloride 102      POC TCO2 (MEASURED) 22 (*)     POC Ionized Calcium 1.15      POC Hematocrit 28 (*)     Sample SUDHIR     LIPASE - Normal    Lipase Level 25     CBC W/ AUTO DIFFERENTIAL    Narrative:     The following orders were created for panel order CBC W/ AUTO DIFFERENTIAL.  Procedure                               Abnormality         Status                     ---------                               -----------         ------                     CBC with Differential[[id number]]       Abnormal            Final result                 Please view results for these tests on the individual orders.   GREY TOP URINE HOLD    Extra Tube Hold for add-ons.     ISTAT CHEM8          Imaging Results               CT Abdomen Pelvis With IV Contrast NO Oral Contrast (Final result)  Result time 06/21/25 00:42:01      Final result by Cristian Infante MD (06/21/25 00:42:01)                   Impression:      1. Circumferential bladder wall thickening with adjacent perivesical inflammatory change concerning for cystitis.  Recommend correlation with urinalysis.  2. IVC filter in place.  Apparent filling defect in the IVC above and surrounding the IVC filter could represent thrombus or mixing of IV contrast.  Below  the IVC filter there is questionable expansile appearance of the iliac and leg veins with subtle adjacent inflammatory change, likely related to slow flow seen on prior DVT ultrasound.  Extensive DVT is also a consideration.  Delayed phase imaging or ultrasound follow-up could be considered.  3. Age-indeterminate mild superior endplate height loss of L1 likely reflects a developing Schmorl's node versus mild compression deformity.  4. Indeterminate hypoattenuating lesion in the inferior right hepatic lobe, presumably focal fat.  Consider attention on follow-up in this patient with reported malignancy.  5. Additional findings as above.  This report was flagged in Epic as abnormal.    Electronically signed by resident: Dago Haddad  Date:    06/20/2025  Time:    23:54    Electronically signed by: Cristian Infante MD  Date:    06/21/2025  Time:    00:42               Narrative:    EXAMINATION:  CT ABDOMEN PELVIS WITH IV CONTRAST    CLINICAL HISTORY:  Abdominal abscess/infection suspected    TECHNIQUE:  Low dose axial images, sagittal and coronal reformations were obtained from the lung bases to the pubic symphysis following the IV administration of 100 mL of Omnipaque 350 .  Oral contrast was not given.    COMPARISON:  report of CT abdomen pelvis 06/09/2025 (images unavailable), retroperitoneal ultrasound 06/18/2025, PET CT 08/23/2023    FINDINGS:  Evaluation is limited by extensive streak artifact due to the patient's arms overlying the field of view.    SOFT TISSUES: Scattered subcutaneous stranding and soft tissue nodules in the anterior abdominal wall, presumably injection related.  Mild symmetric subcutaneous stranding overlying the hips.    LUNG BASES/VISUALIZED MEDIASTINUM: Several thin walled pulmonary cysts.  Right basilar subsegmental atelectasis versus scarring.    HEPATOBILIARY: Liver is normal size. Unchanged cysts in the hepatic dome.  Additional subcentimeter hypodensity in the hepatic dome which is too  small to characterize but unchanged.  Geographic hypoattenuation along the falciform ligament may relate to focal fat or a perfusional abnormality.  Consider attention on follow-up.  Normal gallbladder.  No biliary ductal dilatation.    PANCREAS: No focal masses or ductal dilatation.    SPLEEN: Normal size.    ADRENALS: No adrenal nodules.    KIDNEYS/URETERS: Kidneys are normal size and enhance symmetrically. 1.4 cm hypodensity in the lower pole of left kidney measures greater than simple fluid attenuation but likely correlates with 1.4 cm simple cyst seen on retroperitoneal ultrasound 06/18/2025.  Additional bilateral subcentimeter hypodensities which are too small to characterize.  No convincing correlate for the echogenic focus in the right kidney seen on ultrasound.  No stones or hydronephrosis.  Ureters are unremarkable.    BLADDER/PELVIC ORGANS: Circumferential bladder wall thickening with perivesical inflammatory change.  Prostate brachytherapy seeds.    PERITONEUM / RETROPERITONEUM: Stranding noted in the posterior lower pelvis and along the course of the infrarenal IVC.  No significant free fluid.  No free air.    LYMPH NODES: No lymphadenopathy.    VESSELS: No aortic aneurysm.  Mild aortoiliac calcific atherosclerosis.  Major aortic branch vessels are patent.  Portal venous system is patent.  Circumaortic left renal vein.  IVC filter.  The apparent filling defect within the IVC just superior to the IVC filter may reflect mixing artifact associated with admixture of opacified and non-opacified blood products.  Non-opacification of the IVC and venous system below the IVC filter.  Questionable expansile appearance of the iliac and leg veins with subtle adjacent inflammatory change, particularly about the right common femoral vein (series 2, image 179).    GI TRACT: Small hiatal hernia.  Stomach and duodenum are otherwise unremarkable.  No evidence of bowel obstruction or inflammation.  Normal  appendix.    BONES: Diffuse bony demineralization.  Degenerative changes.  Age-indeterminate mild superior endplate height loss of L1.  No aggressive bony lesion.  PET-CT or MRI follow-up could be helpful if there is concern for osseous metastatic disease.                        Preliminary result by Dago Haddad MD (06/21/25 00:23:31)                   Impression:      1. Circumferential bladder wall thickening with adjacent perivesical inflammatory change concerning for cystitis.  Recommend correlation with urinalysis.  2. IVC filter in place.  Apparent filling defect in the IVC chest superior to the IVC filter is favored related to admixture of opacified and non-opacified blood.  Below the IVC filter there is questionable expansile appearance of the iliac and leg veins with subtle adjacent inflammatory change, likely related to to slow flow seen on prior DVT ultrasound.  3. Age-indeterminate mild superior endplate height loss of L1 likely reflects a developing Schmorl's node versus mild compression deformity.  4. Additional findings as above.  Additional findings as above.    Electronically signed by resident: Dago Haddad  Date:    06/20/2025  Time:    23:54                 Narrative:    EXAMINATION:  CT ABDOMEN PELVIS WITH IV CONTRAST    CLINICAL HISTORY:  Abdominal abscess/infection suspected;    TECHNIQUE:  Low dose axial images, sagittal and coronal reformations were obtained from the lung bases to the pubic symphysis following the IV administration of 100 mL of Omnipaque 350 .  Oral contrast was not given.    COMPARISON:  report of CT abdomen pelvis 06/09/2025 (images unavailable), retroperitoneal ultrasound 06/18/2025, PET CT 08/23/2023    FINDINGS:  SOFT TISSUES: Scattered subcutaneous stranding and soft tissue nodules in the anterior abdominal wall, presumably injection related.  Mild symmetric subcutaneous stranding overlying the hips.    LUNG BASES/VISUALIZED MEDIASTINUM: Several thin walled  pulmonary cysts.  Right basilar subsegmental atelectasis versus scarring.    HEPATOBILIARY: Liver is normal size. Unchanged cysts in the hepatic dome.  Additional subcentimeter hypodensity in the hepatic dome which is too small to characterize but unchanged.  Geographic hypoattenuation along the falciform ligament may relate to focal fat or a perfusional abnormality.  Normal gallbladder.  No biliary ductal dilatation.    PANCREAS: No focal masses or ductal dilatation.    SPLEEN: Normal size.    ADRENALS: No adrenal nodules.    KIDNEYS/URETERS: Kidneys are normal size and enhance symmetrically. 1.4 cm hypodensity in the lower pole of left kidney measures greater than simple fluid attenuation but likely correlates with 1.4 cm simple cyst seen on retroperitoneal ultrasound 06/18/2025.  Additional bilateral subcentimeter hypodensities which are too small to characterize.  No convincing correlate for the echogenic focus in the right kidney seen on ultrasound.  No stones or hydronephrosis.  Ureters are unremarkable.    BLADDER/PELVIC ORGANS: Circumferential bladder wall thickening with perivesical inflammatory change.  Prostate brachytherapy seeds.    PERITONEUM / RETROPERITONEUM: Stranding noted in the posterior lower pelvis and along the course of the infrarenal IVC.  No significant free fluid.  No free air.    LYMPH NODES: No lymphadenopathy.    VESSELS: No aortic aneurysm.  Mild aortoiliac calcific atherosclerosis.  Major aortic branch vessels are patent.  Portal venous system is patent.  Circumaortic left renal vein.  IVC filter.  The apparent filling defect within the IVC just superior to the IVC filter may reflect mixing artifact associated with admixture of opacified and non-opacified blood products.  Non-opacification of the IVC and venous system below the IVC filter.  Questionable expansile appearance of the iliac and leg veins with subtle adjacent inflammatory change, particularly about the right common femoral  vein (series 2, image 179).    GI TRACT: Small hiatal hernia.  Stomach and duodenum are otherwise unremarkable.  No evidence of bowel obstruction or inflammation.  Normal appendix.    BONES: Diffuse bony demineralization.  Degenerative changes.  Age-indeterminate mild superior endplate height loss of L1.  No aggressive bony lesion.                                        CT Head Without Contrast (Final result)  Result time 06/21/25 00:00:09      Final result by Cristian Infante MD (06/21/25 00:00:09)                   Impression:      Hyperdense lesions in the hal and bilateral cerebellum, noting that similar findings were described on outside CT scan dated 06/09/2025.  Additional smaller supratentorial lesions.  Hemorrhagic metastatic disease or other hemorrhagic lesions could be considered.  Suggest follow-up MRI with and without contrast if not previously obtained.    No midline shift or hydrocephalus.    This report was flagged in Epic as abnormal.      Electronically signed by: Cristian Infante MD  Date:    06/21/2025  Time:    00:00               Narrative:    EXAMINATION:  CT HEAD WITHOUT CONTRAST    CLINICAL HISTORY:  Stroke, follow up;    TECHNIQUE:  Low dose axial images were obtained through the head.  Coronal and sagittal reformations were also performed. Contrast was not administered.    COMPARISON:  None.    FINDINGS:  Generalized cerebral volume loss with ex vacuo dilation of the ventricles and sulci. Mild patchy periventricular white matter hypoattenuation suggestive of chronic microvascular ischemic change, though nonspecific.    Hyperdense lesion in the left hal measuring 1.7 cm in size (sagittal image 94).  0.7 cm lesion in the left posterior cerebellum (axial image 10).  Small hypodensity in the right cerebellum (coronal image 94).  Similar findings were described on outside CT scan dated 06/09/2025.  There are possible smaller hyperdense lesions in the bilateral cerebral hemispheres.    No  large acute territorial infarct.  No overt mass effect or midline shift.    Ventricles are normal in size and configuration.    No displaced calvarial fracture.    Visualized paranasal sinuses and mastoid air cells are essentially clear.                                       Medications   diphenhydrAMINE injection 25 mg (25 mg Intravenous Given 6/20/25 2344)   droPERidol injection 2.5 mg (2.5 mg Intravenous Given 6/20/25 2344)   iohexoL (OMNIPAQUE 350) injection 100 mL (100 mLs Intravenous Given 6/20/25 2314)     Medical Decision Making  Patient is a 79-year-old afebrile, HDS, in no acute distress male presenting due to headache and nausea.    DDX:  CVA, increased intracranial pressure, SBO, gastroenteritis    Abdominal exam benign.  CT scan negative for SBO.  No diarrhea, fevers, body aches, chills, persistent vomiting on review of systems.  Low concern for gastroenteritis.  CT imaging demonstrating similar lesions as previously noted on OSH imaging, however, is noting some new supratentorial small lesions that could be concerning for new hemorrhages.  Discussed patient with neurosurgery, vascular neuro, and neuro critical care.  Recommending that we follow up his head CT with the MRI 6 hours later.  Additionally plan is to obtain ultrasounds of bilateral upper and lower extremities based on CT imaging.  Care is handed off to oncoming ED team.  Anticipate if neurology signs off the patient will likely be admitted to Hospital Medicine for further observation and workup.    Amount and/or Complexity of Data Reviewed  Labs: ordered. Decision-making details documented in ED Course.  Radiology: ordered.    Risk  Prescription drug management.  Decision regarding hospitalization.            [unfilled]   ED Course as of 06/21/25 0421   Fri Jun 20, 2025 2014 Triage EKG reviewed: No STEMI [LP]   Sat Jun 21, 2025   0021 POC Creatinine(!): 1.9 [MB]   0021 Hemoglobin(!): 9.0 [MB]   0044 Patient is an 79-year-old male with  past medical history recent hemorrhagic lesions left hal, left cerebellum consistent with cavernous malformations as per medical records discharge summary 06/13/2025 from outside facility that is presenting for ongoing nausea and now confused.  Patient currently at rehab, has had ongoing nausea vomiting over the past several days.  Patient was on heparin however wife states that patient had oozing from recent mesh placed right groin.  Patient was also previously on Eliquis for PE.  Patient has residual right-sided weakness    Physical exam:  Well-appearing 79-year-old male, no acute distress.  Mildly confused.  Persistent residual right-sided weakness however moving left side upper and lower extremity without any difficulty.  Benign cardiac, respiratory, abdominal exam.    Plan:  CT noted, consider new supratentorial lesions.  CT radiology read consider hemorrhagic metastatic disease.  Discussed with Neurosurgery, consulted neuro ICU and vascular Neurology. [RT]   0044 Attestation of Dr. Lowe (Attending Physician):      I have reviewed the record and the patient care provided by the Resident provider and agree with the documented HPI, ROS, PE.  I also agree with the treatment plan and work up and I have performed an independent history / physical exam and MDM.   [RT]   0149 Neuro ICU has recommended MRI with and without 6 hours after CT for stability evaluation.  CT was done at 11:15 p.m..  We will order MRI for 5:15 a.m., 6 hours after as per recommendation [RT]      ED Course User Index  [LP] Nicko Silva III, MD  [MB] Cuauhtemoc Quezada MD  [RT] Greyson Lowe MD                           Clinical Impression:  Final diagnoses:  [R11.2] Nausea & vomiting  [I82.409] DVT (deep venous thrombosis)          ED Disposition Condition    Admit                     Cuauhtemoc Quezada MD  Resident  06/21/25 0417         [1]   Social History  Tobacco Use    Smoking status: Never     Passive exposure: Never    Smokeless tobacco:  Never   Substance Use Topics    Alcohol use: Yes     Alcohol/week: 0.0 standard drinks of alcohol     Comment: social    Drug use: No        Greyson Lowe MD  06/21/25 1029

## 2025-06-21 NOTE — HPI
79 y.o. male with a significant medical history of prostate CA w/brain mets, PE (was on Eliquis until recent ICH), RLE DVT(has IVC filter), HTN, HLD, DM II who presented to the hospital for evaluation of N/V. The patient was seen at [facility] for similar symptoms on 6/9 after having R sided numbness and tingling with dizziness for 2 days prior, and was found to have brainstem ICH and cav mal. He was discharged to [facility] re[facility], but returned to ED on 6/20 for nausea/vomiting. He denies new confusion, LOC or focal weakness.

## 2025-06-21 NOTE — FIRST PROVIDER EVALUATION
Medical screening examination initiated.  I have conducted a focused provider triage encounter, findings are as follows:    Brief history of present illness:  Patient BIB EMS with n/v from rehab - orders placed based upon CC     Vitals:    06/20/25 1758   BP: 116/62   Pulse: 75   Resp: (!) 22   Temp: 98.6 °F (37 °C)   SpO2: 99%   Weight: 104.3 kg (230 lb)       Brief workup plan:  Labs, EKG    Preliminary workup initiated; this workup will be continued and followed by the physician or advanced practice provider that is assigned to the patient when roomed.

## 2025-06-21 NOTE — PT/OT/SLP PROGRESS
Occupational Therapy      Patient Name:  Franko Smith   MRN:  [id number]    Patient not seen today secondary to held for DVT rule out . Will follow-up as appropriate.    6/21/2025

## 2025-06-21 NOTE — ASSESSMENT & PLAN NOTE
Admit to [facility]  Q1h neuro checks, vitals, I/Os  NSGY, VN consulted  Echo, EKG, CXR pending  CT head showed previously seen L hal and B cerebellum IPHs, likely due to cavernous malformations and new, small supratentorial lesions  MRI brain with and without contrast pending as stability scan  A1c, TSH, lipid panel, aPTT, PT/INR, T&S pending  Was previously on Eliquis for DVT/PE  Reversed earlier this month at [facility] and has not been restarted since  Daily CBC, CMP, mag, phos  SBP < 150  PRN labetalol, hydralazine  Seizure precautions  HOB 30°   Diet NPO  PT/OT/SLP  VTE Prophylaxis: mechanical, hold chemical in setting of acute bleed  S/p IVC filter

## 2025-06-21 NOTE — ASSESSMENT & PLAN NOTE
Patient's FSGs are controlled on current medication regimen.  Last A1c reviewed-   Lab Results   Component Value Date    HGBA1C 6.8 (H) 06/21/2025     Most recent fingerstick glucose reviewed-   Recent Labs   Lab 06/21/25  0729   POCTGLUCOSE 125*     Current correctional scale  Low  Maintain anti-hyperglycemic dose as follows-   Antihyperglycemics (From admission, onward)      Start     Stop Route Frequency Ordered    06/21/25 0657  insulin aspart U-100 pen 0-5 Units         -- SubQ Every 6 hours PRN 06/21/25 0557          Hold Oral hypoglycemics while patient is in the hospital.

## 2025-06-21 NOTE — H&P
[facility] - Emergency Dept  Neurocritical Care  History & Physical    Admit Date: 6/20/2025  Service Date: 06/21/2025  Length of Stay: 0    Subjective:     Chief Complaint: ICH (intracerebral hemorrhage)    History of Present Illness: 78 y/o M with a PMH of prostate CA (diagnosed 2023 s/p radiation), PE (diagnosed in Feb of 2024, was previously on Eliquis until recent IPH), RLE DVT, HTN, and HLD who presents to [facility] ED from [facility] with increased nausea/vomiting. He initially was admitted to [facility]/ R sided numbness/tingling and dizziness for two days prior. He initially reported the numbness started on the R side of his face and had extended to his R hand and R side of his abdomen without any RLE numbness/tingling. 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 Per chart review, he had minimal dysmetria in the RUE while at [facility], but otherwise had no focal deficits. MRI with and without contrast was brain negative for any mass. An IVC filter was placed, and his oral AC was not resumed. He was transferred to a rehab facility, and was started on SQH for DVT PPX. He then transferred to [facility] ED for complaints of increased nausea, vomiting, and a headache. On arrival to [facility] ED, he was neuro intact. A CT head revealed similar appearance of previously known lesions, and new, small supratentorial lesions were noted. There was a CT abdomen with IV contrast that was completed for malignancy evaluation which was negative for malignancy but did reveal lower extremity DVT extending up to the femoral vein. Apparent filling defect in the IVC above and surrounding the IVC filter could represent thrombus or mixing of IV contrast. BUE and BLE US pending. MRI brain with and without also pending. NCC was consulted for IPH. Due to the need for anticoagulation therapy due to DVT and potential IVC filter thrombosis,  he will be admitted to [facility] for close monitoring and higher level of care.      Past Medical History:   Diagnosis Date    Anxiety     Diabetes mellitus, type 2     GERD (gastroesophageal reflux disease)     Hyperlipidemia     Hypertension     Obesity (BMI 30-39.9)      Past Surgical History:   Procedure Laterality Date    HERNIA REPAIR      ULNAR NERVE REPAIR Left       Medications Ordered Prior to Encounter[1]   Allergies: Ether for anesthesia  Family History   Problem Relation Name Age of Onset    Hypertension Mother      Hyperlipidemia Mother      Cancer Father      Diabetes Sister      Diabetes Brother      Stroke Neg Hx      Heart disease Neg Hx       Social History[2]  Review of Systems   Constitutional:  Positive for fatigue. Negative for chills, fever and unexpected weight change.   HENT: Negative.     Eyes: Negative.    Respiratory: Negative.  Negative for cough and shortness of breath.    Cardiovascular: Negative.  Negative for chest pain and palpitations.   Gastrointestinal: Negative.    Endocrine: Negative.    Genitourinary: Negative.  Negative for difficulty urinating, dysuria, flank pain, frequency and urgency.   Musculoskeletal: Negative.  Negative for myalgias, neck pain and neck stiffness.   Skin: Negative.  Negative for pallor.   Allergic/Immunologic: Negative.    Neurological:  Positive for numbness (Baseline RUE sensation loss) and headaches (Minimal). Negative for tremors, facial asymmetry, speech difficulty, weakness and light-headedness.   Hematological: Negative.  Does not bruise/bleed easily.   Psychiatric/Behavioral: Negative.       Objective:     Vitals:    Temp: 98 °F (36.7 °C)  Pulse: 100  BP: 124/83  MAP (mmHg): 94  Resp: 18  SpO2: 96 %    Temp  Min: 98 °F (36.7 °C)  Max: 99.2 °F (37.3 °C)  Pulse  Min: 75  Max: 120  BP  Min: 112/53  Max: 130/75  MAP (mmHg)  Min: 77  Max: 94  Resp  Min: 18  Max: 22  SpO2  Min: 96 %  Max: 99 %    No intake/output data recorded.            Physical  Exam  Vitals and nursing note reviewed.   Constitutional:       General: He is not in acute distress.     Appearance: Normal appearance. He is not ill-appearing.      Comments: Resting comfortably in bed   HENT:      Head: Normocephalic and atraumatic.      Nose: Nose normal.      Mouth/Throat:      Mouth: Mucous membranes are moist.      Pharynx: Oropharynx is clear.   Eyes:      Pupils: Pupils are equal, round, and reactive to light.   Cardiovascular:      Rate and Rhythm: Regular rhythm. Tachycardia present.      Pulses: Normal pulses.   Pulmonary:      Effort: Pulmonary effort is normal. No respiratory distress.      Breath sounds: Normal breath sounds.   Abdominal:      General: Abdomen is flat. There is no distension.      Palpations: Abdomen is soft.      Tenderness: There is no abdominal tenderness.   Musculoskeletal:         General: No swelling or tenderness. Normal range of motion.      Cervical back: Neck supple. No rigidity or tenderness.   Skin:     General: Skin is warm and dry.      Capillary Refill: Capillary refill takes less than 2 seconds.   Neurological:      Mental Status: He is alert.      Comments:   E4 V5 M6  Alert. Oriented x4. Speech fluent - no dysarthria or aphasia. Following commands.   PERRLA. EOMI. Visual fields intact   No facial asymmetry.  Tongue midline. Shoulder shrug symmetric.  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 and new, small supratentorial lesions  MRI brain with and without contrast  pending as stability scan  A1c, TSH, lipid panel, aPTT, PT/INR, T&S pending  Was previously on Eliquis for DVT/PE  Reversed earlier this month at [facility] and has not been restarted since  Daily CBC, CMP, mag, phos  SBP < 150  PRN labetalol, hydralazine  Seizure precautions  HOB 30°   Diet NPO  PT/OT/SLP  VTE Prophylaxis: mechanical, hold chemical in setting of acute bleed  S/p IVC filter    Numbness and tingling of right upper extremity  See primary problem    Cardiac/Vascular  Hyperlipidemia  History of  Statin  Lipid panel pending    Essential hypertension  History of  EKG, Echo  SBP < 150  PRN labetalol, hydralazine  Holding home meds    Hematology  Deep vein thrombosis (DVT) of lower extremity  History of  Was previously on Eliquis for DVT/PE  BLE US at [facility] earlier this month revealed RLE popliteal DVT  CT abdomen pelvis with IV contrast at [facility] ED showed extension of DVT to the femoral vein with potential clot around the IVC filter  Pending MRI, minimal intensity heparin gtt will be ordered if bleed stable      Oncology  Prostate cancer  History of  Diagnosed in 2023, s/p radiation therapy, was previously on hormonal therapy (recently discontinued once admitted to [facility] earlier this month)  CT chest/abdomen/pelvis with hepatic and renal lesions, negative for other primary malignancy  Generalized muscle weakness due to cancer and hormonal therapy  Follows regularly with Oncology    Endocrine  Diabetes mellitus  History of  Hold home meds  SSI PRN  A1C pending    GI  Gastro-esophageal reflux disease without esophagitis  History of  Restart home meds          The patient is being Prophylaxed for:  Venous Thromboembolism with: Chemical or IVCF  Stress Ulcer with: Not Applicable   Ventilator Pneumonia with: not applicable    Activity Orders            Diet NPO: NPO starting at 06/20 1926          No Order    Critical care time spent on the evaluation and treatment of severe organ dysfunction, review of pertinent labs and  imaging studies, discussions with consulting providers and discussions with patient/family: 55 minutes.    Nicko Cruz, [facility]  Neurocritical Care  [facility] - Emergency Dept       [1]   Current Facility-Administered Medications on File Prior to Encounter   Medication Dose Route Frequency Provider Last Rate Last Admin    leuprolide acetate (6 month) injection 45 mg  45 mg Intramuscular Q6 Months Alexandru Zamora Jr., MD        [DISCONTINUED] allopurinoL tablet  50 mg Oral  Provider, Generic External Data        [DISCONTINUED] ferrous sulfate tablet   Oral  Provider, Generic External Data        [DISCONTINUED] GENERIC EXTERNAL MEDICATION     Provider, Generic External Data        [DISCONTINUED] GENERIC EXTERNAL MEDICATION     Provider, Generic External Data        [DISCONTINUED] GENERIC EXTERNAL MEDICATION     Provider, Generic External Data        [DISCONTINUED] GENERIC EXTERNAL MEDICATION     Provider, Generic External Data        [DISCONTINUED] mirtazapine tablet  7.5 mg Oral  Provider, Generic External Data        [DISCONTINUED] tiZANidine tablet  2 mg Oral Q6H PRN Provider, Generic External Data        [DISCONTINUED] vitamin D 1000 units tablet  1,000 Units Oral  Provider, Generic External Data         Current Outpatient Medications on File Prior to Encounter   Medication Sig Dispense Refill    acetaminophen (TYLENOL) 500 MG tablet Take 500 mg by mouth every 6 (six) hours as needed for Pain.      amLODIPine (NORVASC) 10 MG tablet Take 0.5 tablets by mouth once daily.      apixaban (ELIQUIS) 5 mg Tab Take 5 mg by mouth 2 (two) times daily.      clonazePAM (KLONOPIN) 0.5 MG tablet Take 1 tablet by mouth daily as needed.      diclofenac sodium (VOLTAREN) 1 % Gel Apply topically.      efinaconazole (JUBLIA) 10 % Karla Apply topically.      Hydrocortisone 1%-Econazole 1% Topical Cream Apply topically.      levocetirizine (XYZAL) 5 MG tablet Take 1 tablet (5 mg total) by mouth every evening. 30 tablet 11     LIDOcaine (LIDODERM) 5 % Apply topically.      losartan (COZAAR) 50 MG tablet Take 50 mg by mouth once daily.      megestroL (MEGACE) 40 MG Tab Take 1 tablet (40 mg total) by mouth once daily. 30 tablet 11    metformin (GLUCOPHAGE) 500 MG tablet Take 500 mg by mouth 2 (two) times daily with meals.      miconazole NITRATE 2 % (MICOTIN) 2 % top powder Apply topically.      omeprazole (PRILOSEC) 20 MG capsule Take 20 mg by mouth 2 (two) times daily.       pravastatin (PRAVACHOL) 40 MG tablet Take 40 mg by mouth once daily.      semaglutide (OZEMPIC SUBQ) Inject into the skin.      semaglutide (OZEMPIC) 2 mg/dose (8 mg/3 mL) PnIj Inject 2mg into the skin once weekly 3 mL 0    tadalafil (CIALIS) 20 MG Tab Use one tablet every 36 hours 5 tablet 5    tamsulosin (FLOMAX) 0.4 mg Cap Take 2 capsules (0.8 mg total) by mouth once daily. 180 capsule 3   [2]   Social History  Tobacco Use    Smoking status: Never     Passive exposure: Never    Smokeless tobacco: Never   Substance Use Topics    Alcohol use: Yes     Alcohol/week: 0.0 standard drinks of alcohol     Comment: social    Drug use: No

## 2025-06-21 NOTE — ASSESSMENT & PLAN NOTE
-Stroke risk factor. Was on hormone therapy that was discontinued while he was at [facility] earlier this month.

## 2025-06-21 NOTE — NURSING
Patient arrived to [facility] from ED      Type of stroke/diagnosis:  ICH      Current symptoms: numbness RUE and R side of mouth, weakness R side, moves all extremities, headache, double vision    Skin Assessment done: Yes  Wounds noted: r groin incision from previous IVCF placement (old drainage)  *If wounds noted, was Wound Care consulted? na  *If wounds noted, LDA placed? NA  Skin Assessment Verified by:  Boby sanchez RN, Hu Mora Completed? Yes    Patient Belongings on Admit: (be specific)-  no belongings    [facility] notified: SALEEM Roberson

## 2025-06-21 NOTE — ED TRIAGE NOTES
Pt arrives to ED with complaints of vomiting. Pt states he has been nauseated and vomiting since June 5th. He was recently admitted at [facility] two weeks ago for stroke and nausea/vomiting. He endorses abdominal discomfort , nausea, vomiting.  He has residual right sided weakness from his stroke. Family states they have tried zofran and phenergan for nausea with no relief.  Pt denies CP, SOB.

## 2025-06-21 NOTE — HPI
78 y/o M with a PMH of prostate CA (diagnosed 2023 s/p radiation), PE (diagnosed in Feb of 2024, was previously on Eliquis until recent IPH), RLE DVT, HTN, and HLD who presents to [facility] ED from [facility] with increased nausea/vomiting. He initially was admitted to [facility]/ R sided numbness/tingling and dizziness for two days prior. He initially reported the numbness started on the R side of his face and had extended to his R hand and R side of his abdomen without any RLE numbness/tingling. 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 Per chart review, he had minimal dysmetria in the RUE while at [facility], but otherwise had no focal deficits. MRI with and without contrast was brain negative for any mass. An IVC filter was placed, and his oral AC was not resumed. He was transferred to a rehab facility, and was started on SQH for DVT PPX. He then transferred to [facility] ED for complaints of increased nausea, vomiting, and a headache. On arrival to [facility] ED, he was neuro intact. A CT head revealed similar appearance of previously known lesions, and new, small supratentorial lesions were noted. There was a CT abdomen with IV contrast that was completed for malignancy evaluation which was negative for malignancy but did reveal lower extremity DVT extending up to the femoral vein. Apparent filling defect in the IVC above and surrounding the IVC filter could represent thrombus or mixing of IV contrast. BUE and BLE US pending. MRI with and without also pending. NCC was consulted for IPH. Due tot he need for anticoagulation therapy due to DVT and potential IVC filter thrombosis, he will be admitted to [facility] for close monitoring and higher level of care.

## 2025-06-21 NOTE — ED NOTES
Assumed care of the patient. Report received from LILIA larose. Pt on continuous cardiac monitoring, continuous pulse oximetry, and automatic BP cuff cycling Q15min. Pt in hospital gown, side rails up X2, bed low and locked, and call light is placed within reach. No family/visitors at bedside at this time. Pt denies any complaints or needs. Pt A&O x4.  Fall precautions remain intact.  Fall risk band in place.  Non slip socks applied to feet.  Pt denies pain at this time.  White board updated.  Pt updated on POC.      Franko Smith, a 79 y.o. male presents to the ED w/ complaint of right sided numbness.

## 2025-06-21 NOTE — ASSESSMENT & PLAN NOTE
History of  Diagnosed in 2023, s/p radiation therapy, was previously on hormonal therapy (recently discontinued once admitted to [facility] earlier this month)  CT chest/abdomen/pelvis with hepatic and renal lesions, negative for other primary malignancy  Generalized muscle weakness due to cancer and hormonal therapy  Follows regularly with Oncology

## 2025-06-21 NOTE — CONSULTS
[facility]  Vascular Neurology  [facility]  Consult Note    Inpatient consult to Neurology Services (Vascular Neurology)  Consult performed by: Kristen Philip DNP  Consult ordered by: Cuauhtemoc Quezada MD  Reason for consult: ICH      Assessment/Plan:     * ICH (intracerebral hemorrhage)  Franko Smith is a 79 y.o. male with a significant medical history of prostate CA w/brain mets, PE (was on Eliquis until recent ICH), RLE DVT(has IVC filter), HTN, HLD, DM II who presented to the hospital for evaluation of N/V. The patient was seen at [facility] for similar symptoms on 6/9 after having R sided numbness and tingling with dizziness for 2 days prior. MRI Brain at that time was negative for mass. He was found to have an ICH and evaluated by Neurosurgery who felt the findings were r/t cavernous malformations. The patient was transferred to [facility] and was started on SQH for DVT prophylaxis. He began to complain of persistent N/V and intermittent dizziness.     On initial exam the patient is lethargic but arousable. He endorses nausea and dizziness but denies recent vomiting. He has mild aphasia and RLE weakness. NIHSS 4. He is admitted to [facility].    Antithrombotics for secondary stroke prevention: Anticoagulants: Heparin protocol without bolus    Statins for secondary stroke prevention and hyperlipidemia, if present:   Statins: Atorvastatin- 40 mg daily    Aggressive risk factor modification: HTN, DM, HLD, Cancer     Rehab efforts: The patient has been evaluated by a stroke team provider and the therapy needs have been fully considered based off the presenting complaints and exam findings. The following therapy evaluations are needed: PT evaluate and treat, OT evaluate and treat, SLP evaluate and treat, PM&R evaluate for appropriate placement    Diagnostics ordered/pending: None     VTE prophylaxis: Mechanical prophylaxis: Place SCDs  None: Reason for No Pharmacological VTE Prophylaxis:  Currently on anticoagulation    BP parameters: ICH: SBP Goal Range 130-150        Deep vein thrombosis (DVT) of lower extremity  -Stroke risk factor. Patient has an IVC filter in place that may now be thrombosed.   -Admitted to [facility] for close monitoring as he was started on minimal intensity heparin gtt.    Numbness and tingling of right upper extremity  -Possibly due to ICH. Therapy evals pending.    Prostate cancer  -Stroke risk factor. Was on hormone therapy that was discontinued while he was at [facility] earlier this month.    Diabetes mellitus  Patient's FSGs are controlled on current medication regimen.  Last A1c reviewed-   Lab Results   Component Value Date    HGBA1C 6.8 (H) 06/21/2025     Most recent fingerstick glucose reviewed-   Recent Labs   Lab 06/21/25  0729   POCTGLUCOSE 125*     Current correctional scale  Low  Maintain anti-hyperglycemic dose as follows-   Antihyperglycemics (From admission, onward)      Start     Stop Route Frequency Ordered    06/21/25 0657  insulin aspart U-100 pen 0-5 Units         -- SubQ Every 6 hours PRN 06/21/25 0557          Hold Oral hypoglycemics while patient is in the hospital.      Hyperlipidemia  -Vascular disease risk factor. LDL 54.2.  -Continue home statin.    Essential hypertension  -Vascular disease risk factor.  -Current SBP goal 130-150  -Patients blood pressure range in the last 24 hours was: BP  Min: 106/55  Max: 130/75.The patient's inpatient anti-hypertensive regimen is listed below:  Current Antihypertensives  labetalol 20 mg/4 mL (5 mg/mL) IV syring, Every 4 hours PRN, Intravenous  hydrALAZINE injection 10 mg, Every 4 hours PRN, Intravenous    Plan  - BP is controlled, no changes needed to their regimen              STROKE DOCUMENTATION          NIH Scale:  Interval: baseline  1a. Level of Consciousness: 0-->Alert, keenly responsive  1b. LOC Questions: 0-->Answers both questions correctly  1c. LOC Commands: 1-->Performs one task correctly  2. Best Gaze:  0-->Normal  3. Visual: 0-->No visual loss  4. Facial Palsy: 0-->Normal symmetrical movements  5a. Motor Arm, Left: 0-->No drift, limb holds 90 (or 45) degrees for full 10 secs  5b. Motor Arm, Right: 0-->No drift, limb holds 90 (or 45) degrees for full 10 secs  6a. Motor Leg, Left: 0-->No drift, leg holds 30 degree position for full 5 secs  6b. Motor Leg, Right: 2-->Some effort against gravity, leg falls to bed by 5 secs, but has some effort against gravity  7. Limb Ataxia: 0-->Absent  8. Sensory: 0-->Normal, no sensory loss  9. Best Language: 1-->Mild-to-moderate aphasia, some obvious loss of fluency or facility of comprehension, without significant limitation on ideas expressed or form of expression. Reduction of speech and/or comprehension, however, makes conversation. . . (see row details)  10. Dysarthria: 0-->Normal  11. Extinction and Inattention (formerly Neglect): 0-->No abnormality  Total (NIH Stroke Scale): 4    Modified McHenry Score: 3  Vinicio Coma Scale:15   ABCD2 Score:    [id number] Score:   HAS -BLED Score:   ICH Score:2  Hunt & Moy Classification:         Subjective:     History of Present Illness:  Franko Smith is a 79 y.o. male with a significant medical history of prostate CA w/brain mets, PE (was on Eliquis until recent ICH), RLE DVT(has IVC filter), HTN, HLD, DM II who presented to the hospital for evaluation of N/V. The patient was seen at [facility] for similar symptoms on 6/9 after having R sided numbness and tingling with dizziness for 2 days prior. MRI Brain at that time was negative for mass. He was found to have an ICH and evaluated by Neurosurgery who felt the findings were r/t cavernous malformations. The patient was transferred to [facility] and was started on SQH for DVT prophylaxis. He began to complain of persistent N/V and intermittent dizziness. The patient was transferred to [facility] ED for evaluation.            Past Medical History:   Diagnosis Date    Anxiety     Diabetes  mellitus, type 2     GERD (gastroesophageal reflux disease)     Hyperlipidemia     Hypertension     Obesity (BMI 30-39.9)      Past Surgical History:   Procedure Laterality Date    HERNIA REPAIR      ULNAR NERVE REPAIR Left      Social History[1]  Review of patient's allergies indicates:   Allergen Reactions    Ether for anesthesia Nausea And Vomiting       Medications: I have reviewed the current medication administration record.    Current Outpatient Medications   Medication Instructions    acetaminophen (TYLENOL) 500 mg, Every 6 hours PRN    amLODIPine (NORVASC) 10 MG tablet 0.5 tablets, Daily    apixaban (ELIQUIS) 5 mg, 2 times daily    clonazePAM (KLONOPIN) 0.5 MG tablet 1 tablet, Daily PRN    diclofenac sodium (VOLTAREN) 1 % Gel Apply topically.    efinaconazole (JUBLIA) 10 % Karla Apply topically.    Hydrocortisone 1%-Econazole 1% Topical Cream Apply topically.    levocetirizine (XYZAL) 5 mg, Oral, Nightly    LIDOcaine (LIDODERM) 5 % Apply topically.    losartan (COZAAR) 50 mg, Daily    megestroL (MEGACE) 40 mg, Oral, Daily    metFORMIN (GLUCOPHAGE) 500 mg, 2 times daily with meals    miconazole NITRATE 2 % (MICOTIN) 2 % top powder Apply topically.    omeprazole (PRILOSEC) 20 mg, 2 times daily    pravastatin (PRAVACHOL) 40 mg, Daily    semaglutide (OZEMPIC SUBQ) Inject into the skin.    semaglutide (OZEMPIC) 2 mg/dose (8 mg/3 mL) PnIj Inject 2mg into the skin once weekly    tadalafil (CIALIS) 20 MG Tab Use one tablet every 36 hours    tamsulosin (FLOMAX) 0.8 mg, Oral, Daily         Review of Systems   Gastrointestinal:  Positive for nausea and vomiting.   Neurological:  Positive for dizziness, speech difficulty and weakness.   All other systems reviewed and are negative.    Objective:     Vital Signs (Most Recent):  Temp: 99 °F (37.2 °C) (06/21/25 0758)  Pulse: 97 (06/21/25 0758)  Resp: (!) 23 (06/21/25 0758)  BP: 118/61 (06/21/25 0758)  SpO2: 99 % (06/21/25 0758)    Vital Signs Range  (Last 24H):  Temp:  [98 °F (36.7 °C)-99.2 °F (37.3 °C)]   Pulse:  []   Resp:  [18-23]   BP: (106-130)/(53-83)   SpO2:  [96 %-99 %]        Physical Exam  Vitals and nursing note reviewed.   Eyes:      General:         Right eye: No discharge.         Left eye: No discharge.      Extraocular Movements: Extraocular movements intact.      Conjunctiva/sclera: Conjunctivae normal.   Cardiovascular:      Rate and Rhythm: Normal rate.   Pulmonary:      Effort: Pulmonary effort is normal. No respiratory distress.   Abdominal:      General: There is no distension.   Musculoskeletal:      Cervical back: Normal range of motion and neck supple.   Skin:     General: Skin is warm and dry.   Neurological:      Mental Status: He is alert and oriented to person, place, and time.      Cranial Nerves: No cranial nerve deficit.      Motor: Weakness present.   Psychiatric:         Mood and Affect: Mood normal.              Neurological Exam:   LOC: drowsy  Language: Expressive aphasia, Receptive aphasia  Articulation: No dysarthria  Orientation: Person, Place, Time   Facial Movement (CN VII): Symmetric facial expression    Sensation: intact to light touch      Laboratory:  CMP:   Recent Labs   Lab 06/21/25  0625   CALCIUM 8.9   ALBUMIN 3.9   PROT 7.1      K 4.4   CO2 19*      BUN 30*   CREATININE 1.9*   ALKPHOS 74   ALT 15   AST 12   BILITOT 1.5*     CBC:   Recent Labs   Lab 06/21/25  0625   WBC 6.30   RBC 3.10*   HGB 8.6*   HCT 27.0*   PLT 72*   MCV 87   MCH 27.7   MCHC 31.9*     Lipid Panel:   Recent Labs   Lab 06/21/25  0625   CHOL 111*   LDLCALC 54.2*   HDL 32*   TRIG 124     Coagulation:   Recent Labs   Lab 06/21/25  0625   INR 1.3*   APTT 35.2*     Hgb A1C:   Recent Labs   Lab 06/21/25  0625   HGBA1C 6.8*     TSH:   Recent Labs   Lab 06/21/25  0625   TSH 1.720       Diagnostic Results: All imaging and labs personally reviewed.      Brain imaging:  MRI Brain w/wo 6/21/2025 subacute IPH in the left hal, left  cerebellum with chronic hemorrhages in the bilateral cerebral and cerebellar hemispheres.    Vessel Imaging:  None    Cardiac Evaluation:   EKG 6/21/2025 NSR, prolonged QT      Kristen Philip DNP  [facility]  Department of Vascular Neurology   [facility]          [1]  Social History  Tobacco Use    Smoking status: Never     Passive exposure: Never    Smokeless tobacco: Never   Substance Use Topics    Alcohol use: Yes     Alcohol/week: 0.0 standard drinks of alcohol     Comment: social    Drug use: No

## 2025-06-21 NOTE — SUBJECTIVE & OBJECTIVE
Past Medical History:   Diagnosis Date    Anxiety     Diabetes mellitus, type 2     GERD (gastroesophageal reflux disease)     Hyperlipidemia     Hypertension     Obesity (BMI 30-39.9)      Past Surgical History:   Procedure Laterality Date    HERNIA REPAIR      ULNAR NERVE REPAIR Left      Social History[1]  Review of patient's allergies indicates:   Allergen Reactions    Ether for anesthesia Nausea And Vomiting       Medications: I have reviewed the current medication administration record.    Current Outpatient Medications   Medication Instructions    acetaminophen (TYLENOL) 500 mg, Every 6 hours PRN    amLODIPine (NORVASC) 10 MG tablet 0.5 tablets, Daily    apixaban (ELIQUIS) 5 mg, 2 times daily    clonazePAM (KLONOPIN) 0.5 MG tablet 1 tablet, Daily PRN    diclofenac sodium (VOLTAREN) 1 % Gel Apply topically.    efinaconazole (JUBLIA) 10 % Karla Apply topically.    Hydrocortisone 1%-Econazole 1% Topical Cream Apply topically.    levocetirizine (XYZAL) 5 mg, Oral, Nightly    LIDOcaine (LIDODERM) 5 % Apply topically.    losartan (COZAAR) 50 mg, Daily    megestroL (MEGACE) 40 mg, Oral, Daily    metFORMIN (GLUCOPHAGE) 500 mg, 2 times daily with meals    miconazole NITRATE 2 % (MICOTIN) 2 % top powder Apply topically.    omeprazole (PRILOSEC) 20 mg, 2 times daily    pravastatin (PRAVACHOL) 40 mg, Daily    semaglutide (OZEMPIC SUBQ) Inject into the skin.    semaglutide (OZEMPIC) 2 mg/dose (8 mg/3 mL) PnIj Inject 2mg into the skin once weekly    tadalafil (CIALIS) 20 MG Tab Use one tablet every 36 hours    tamsulosin (FLOMAX) 0.8 mg, Oral, Daily         Review of Systems   Gastrointestinal:  Positive for nausea and vomiting.   Neurological:  Positive for dizziness, speech difficulty and weakness.   All other systems reviewed and are negative.    Objective:     Vital Signs (Most Recent):  Temp: 99 °F (37.2 °C) (06/21/25 0758)  Pulse: 97 (06/21/25 0758)  Resp: (!) 23 (06/21/25 0758)  BP: 118/61 (06/21/25  0758)  SpO2: 99 % (06/21/25 0758)    Vital Signs Range (Last 24H):  Temp:  [98 °F (36.7 °C)-99.2 °F (37.3 °C)]   Pulse:  []   Resp:  [18-23]   BP: (106-130)/(53-83)   SpO2:  [96 %-99 %]        Physical Exam  Vitals and nursing note reviewed.   Eyes:      General:         Right eye: No discharge.         Left eye: No discharge.      Extraocular Movements: Extraocular movements intact.      Conjunctiva/sclera: Conjunctivae normal.   Cardiovascular:      Rate and Rhythm: Normal rate.   Pulmonary:      Effort: Pulmonary effort is normal. No respiratory distress.   Abdominal:      General: There is no distension.   Musculoskeletal:      Cervical back: Normal range of motion and neck supple.   Skin:     General: Skin is warm and dry.   Neurological:      Mental Status: He is alert and oriented to person, place, and time.      Cranial Nerves: No cranial nerve deficit.      Motor: Weakness present.   Psychiatric:         Mood and Affect: Mood normal.              Neurological Exam:   LOC: drowsy  Language: Expressive aphasia, Receptive aphasia  Articulation: No dysarthria  Orientation: Person, Place, Time   Facial Movement (CN VII): Symmetric facial expression    Sensation: intact to light touch      Laboratory:  CMP:   Recent Labs   Lab 06/21/25  0625   CALCIUM 8.9   ALBUMIN 3.9   PROT 7.1      K 4.4   CO2 19*      BUN 30*   CREATININE 1.9*   ALKPHOS 74   ALT 15   AST 12   BILITOT 1.5*     CBC:   Recent Labs   Lab 06/21/25  0625   WBC 6.30   RBC 3.10*   HGB 8.6*   HCT 27.0*   PLT 72*   MCV 87   MCH 27.7   MCHC 31.9*     Lipid Panel:   Recent Labs   Lab 06/21/25  0625   CHOL 111*   LDLCALC 54.2*   HDL 32*   TRIG 124     Coagulation:   Recent Labs   Lab 06/21/25  0625   INR 1.3*   APTT 35.2*     Hgb A1C:   Recent Labs   Lab 06/21/25  0625   HGBA1C 6.8*     TSH:   Recent Labs   Lab 06/21/25  0625   TSH 1.720       Diagnostic Results: All imaging and labs personally reviewed.      Brain imaging:  MRI Brain  w/wo 6/21/2025 subacute IPH in the left hal, left cerebellum with chronic hemorrhages in the bilateral cerebral and cerebellar hemispheres.    Vessel Imaging:  None    Cardiac Evaluation:   EKG 6/21/2025 NSR, prolonged QT         [1]   Social History  Tobacco Use    Smoking status: Never     Passive exposure: Never    Smokeless tobacco: Never   Substance Use Topics    Alcohol use: Yes     Alcohol/week: 0.0 standard drinks of alcohol     Comment: social    Drug use: No

## 2025-06-21 NOTE — ED NOTES
I-STAT Chem-8+ Results:   Value Reference Range   Sodium 135 136-145 mmol/L   Potassium  4.7 3.5-5.1 mmol/L   Chloride 102  mmol/L   Ionized Calcium 1.15 1.06-1.42 mmol/L   CO2 (measured) 22 23-29 mmol/L   Glucose 111  mg/dL   BUN 35 6-30 mg/dL   Creatinine 1.9 0.5-1.4 mg/dL   Hematocrit 28 36-54%

## 2025-06-21 NOTE — CONSULTS
Inpatient consult to Physical Medicine Rehab  Consult performed by: Kat Spear NP  Consult ordered by: Nicko Cruz, AGACNP-BC      Consult received.      Kat Spear NP  Physical Medicine & Rehabilitation   06/21/2025

## 2025-06-21 NOTE — CONSULTS
[facility] Critical Care  Neurosurgery  Consult Note    Subjective:     History of Present Illness: 79 y.o. male with a significant medical history of prostate CA w/brain mets, PE (was on Eliquis until recent ICH), RLE DVT(has IVC filter), HTN, HLD, DM II who presented to the hospital for evaluation of N/V. The patient was seen at [facility] for similar symptoms on 6/9 after having R sided numbness and tingling with dizziness for 2 days prior, and was found to have brainstem ICH and cav mal. He was discharged to [facility], but returned to ED on 6/20 for nausea/vomiting. He denies new confusion, LOC or focal weakness.     Post-Op Info:  * No surgery found *         Past Medical History:   Diagnosis Date    Anxiety     Diabetes mellitus, type 2     GERD (gastroesophageal reflux disease)     Hyperlipidemia     Hypertension     Obesity (BMI 30-39.9)        Past Surgical History:   Procedure Laterality Date    HERNIA REPAIR      ULNAR NERVE REPAIR Left        Social History[1]    Family History   Problem Relation Name Age of Onset    Hypertension Mother      Hyperlipidemia Mother      Cancer Father      Diabetes Sister      Diabetes Brother      Stroke Neg Hx      Heart disease Neg Hx         Review of patient's allergies indicates:   Allergen Reactions    Ether for anesthesia Nausea And Vomiting         Medications:  Continuous Infusions:  Scheduled Meds:   polyethylene glycol  17 g Oral Daily    pravastatin  40 mg Oral Daily    senna-docusate  1 tablet Oral BID    tamsulosin  0.8 mg Oral Daily     PRN Meds:  Current Facility-Administered Medications:     acetaminophen, 650 mg, Oral, Q6H PRN    dextrose 50%, 12.5 g, Intravenous, PRN    glucagon (human recombinant), 1 mg, Intramuscular, PRN    hydrALAZINE, 10 mg, Intravenous, Q4H PRN    insulin aspart U-100, 0-5 Units, Subcutaneous, Q6H PRN    labetalol, 10 mg, Intravenous, Q4H PRN    ondansetron, 4 mg, Intravenous, Q4H PRN    sodium chloride 0.9%, 10 mL, Intravenous,  PRN     Review of Systems  Objective:     Weight: 104.3 kg (229 lb 15 oz)  Body mass index is 31.19 kg/m².  Vital Signs (Most Recent):  Temp: 99 °F (37.2 °C) (06/21/25 0758)  Pulse: 97 (06/21/25 0758)  Resp: (!) 23 (06/21/25 0758)  BP: 118/61 (06/21/25 0758)  SpO2: 99 % (06/21/25 0758) Vital Signs (24h Range):  Temp:  [98 °F (36.7 °C)-99.2 °F (37.3 °C)] 99 °F (37.2 °C)  Pulse:  [] 97  Resp:  [18-23] 23  SpO2:  [96 %-99 %] 99 %  BP: (106-130)/(53-83) 118/61                                 Physical Exam         Neurosurgery Physical Exam    Physical Exam:    Constitutional: drowsy from ativan for MRI    HEENT: atraumatic/normocephalic    Cardiovascular: Regular rhythm.     Pulm: aerating well, saturating well    Abdominal: Soft.     Psych/Behavior: He is alert.     E3V4M6,   Aox3, mild confusion  PERRL  EOMI  Face Symmetric  Tongue midline  FC x4 full strength       Significant Labs:  Recent Labs   Lab 06/20/25  0544 06/20/25 2215 06/21/25  0625   * 108 121*   * 135* 136   K 4.3 4.7 4.4    104 105   CO2 22* 19* 19*   BUN 27* 28* 30*   CREATININE 1.6* 1.6* 1.9*   CALCIUM 8.9 8.9 8.9   MG 2.6  --  2.5     Recent Labs   Lab 06/20/25  0544 06/20/25 2215 06/20/25 2226 06/21/25  0625   WBC 5.70 6.92  --  6.30   HGB 8.8* 9.0*  --  8.6*   HCT 28.2* 28.3* 28* 27.0*   PLT 83* 128*  --  72*     Recent Labs   Lab 06/21/25  0147 06/21/25  0625   INR 1.3* 1.3*   APTT  --  35.2*     Microbiology Results (last 7 days)       ** No results found for the last 168 hours. **          All pertinent labs from the last 24 hours have been reviewed.    Significant Diagnostics:  I have reviewed and interpreted all pertinent imaging results/findings within the past 24 hours.    Assessment/Plan:     * ICH (intracerebral hemorrhage)  79 y.o. male with a significant medical history of prostate CA w/brain mets, PE (was on Eliquis until recent ICH), RLE DVT(has IVC filter), HTN, HLD, DM II, recently admitted to [facility] 6/9 for  brainstem IVH and cav mal, discharged to [facility], who re presented to the hospital for evaluation of N/V    MRI brain w/wo: 0.8 cm left doral midbrain/rostral hal cav mal with ICH, late subacute blood, likely corresponding with bleed from initial hospitalization on 6/9      - admitted to ICU,   - q1 hour neurochecks  - SBP < 140  - no further imaging at this time  - continue to monitor clinically for symptomatic improvement  - PT/INR/PTT  - eliquis held since last hospitalization 6/9 for initial ICH  - OK for SQH given subacute nature of bleed  - OK for PT/OT OOB         Francine Astudillo MD  Neurosurgery  [facility]       [1]   Social History  Socioeconomic History    Marital status:     Number of children: 5   Occupational History    Occupation: Retired     Tobacco Use    Smoking status: Never     Passive exposure: Never    Smokeless tobacco: Never   Substance and Sexual Activity    Alcohol use: Yes     Alcohol/week: 0.0 standard drinks of alcohol     Comment: social    Drug use: No    Sexual activity: Yes     Partners: Female     Social Drivers of Health     Financial Resource Strain: Low Risk  (6/16/2025)    Received from [facility]    Overall Financial Resource Strain (CARDIA)     Difficulty of Paying Living Expenses: Not hard at all   Food Insecurity: No Food Insecurity (6/16/2025)    Received from [facility]    Hunger Vital Sign     Worried About Running Out of Food in the Last Year: Never true     Ran Out of Food in the Last Year: Never true   Transportation Needs: No Transportation Needs (6/13/2025)    Received from [facility] [place] SDOH Transportation Source     Has lack of transportation kept you from medical appointments or from getting medications?: No     Has lack of transportation kept you from meetings, work, or from getting things needed for daily living?: No   Stress: No Stress Concern Present (6/13/2025)    Received from [facility]  [place] [employer] - Occupational Stress Questionnaire     Feeling of Stress : Not at all   Housing Stability: Low Risk  (6/16/2025)    Received from [facility]    Housing Stability Vital Sign     Unable to Pay for Housing in the Last Year: No     Number of Times Moved in the Last Year: 0     Homeless in the Last Year: No

## 2025-06-21 NOTE — PROGRESS NOTES
Vascular neurology update  MRI reviewed and agree that etiology known cavernomas. Microhemorrhages on SWI also concerning for cavernoma given size and distribution.      No further recommendations from Neurovascular perspective.   Will defer AC to NCC and NSGY.     Thank you for the consult. Vascular Neurology will sign off at this time. Please call or consult for any questions or concerns.     Yasmeen Bull MD   Vascular Neurology Fellow- PGY5  [facility]

## 2025-06-21 NOTE — HPI
Franko Smith is a 79 y.o. male with a significant medical history of prostate CA w/brain mets, PE (was on Eliquis until recent ICH), RLE DVT(has IVC filter), HTN, HLD, DM II who presented to the hospital for evaluation of N/V. The patient was seen at [facility] for similar symptoms on 6/9 after having R sided numbness and tingling with dizziness for 2 days prior. MRI Brain at that time was negative for mass. He was found to have an ICH and evaluated by Neurosurgery who felt the findings were r/t cavernous malformations. The patient was transferred to [facility] and was started on SQH for DVT prophylaxis. He began to complain of persistent N/V and intermittent dizziness. The patient was transferred to [facility] ED for evaluation.

## 2025-06-21 NOTE — ED NOTES
Assumed care  of pt and have received report from nurse.   Franko Smith, a 79 y.o. male presents to the ED w/ complaint of N/V    Triage note:  Chief Complaint   Patient presents with    Vomiting     Pt arrives via ems from [facility] for vomiting. Per nurses only one episode of emesis. Hx of stroke with deficits to right side.     Review of patient's allergies indicates:   Allergen Reactions    Ether for anesthesia Nausea And Vomiting     Past Medical History:   Diagnosis Date    Anxiety     Diabetes mellitus, type 2     GERD (gastroesophageal reflux disease)     Hyperlipidemia     Hypertension     Obesity (BMI 30-39.9)

## 2025-06-21 NOTE — PLAN OF CARE
[facility] - Neuro [facility]  Initial Discharge Assessment       Primary Care Provider: Hayley, Primary Doctor    Admission Diagnosis: Nausea & vomiting [R11.2]  DVT (deep venous thrombosis) [I82.409]    Admission Date: 6/20/2025  Expected Discharge Date:     Transition of Care Barriers: None    Payor: [employer] [facility] / Plan: [employer] MCARE ADV / Product Type: Medicare Advantage /     Extended Emergency Contact Information  Primary Emergency Contact: LuisVirginia  Address: [address] [place] [address] [facility]  Home Phone: [phone]  Mobile Phone: [phone]  Relation: Spouse    Discharge Plan A: Home with family  Discharge Plan B: Home Health      [facility]  [address] [facility] [address]  Phone: [phone] Fax: [phone]    [id number] [address] [place] [address]  [address]  [facility] [address]  Phone: [phone] Fax: [phone]      Initial Assessment (most recent)       Adult Discharge Assessment - 06/21/25 1438          Discharge Assessment    Assessment Type Discharge Planning Assessment     Confirmed/corrected address, phone number and insurance Yes     Confirmed Demographics Correct on Facesheet     Source of Information patient     Communicated NAMRATA with patient/caregiver Yes     People in Home spouse     Name(s) of People in Home Virginia Smith (Spouse)  [phone]     Do you expect to return to your current living situation? Yes     Prior to hospitilization cognitive status: Alert/Oriented     Current cognitive status: Alert/Oriented     Walking or Climbing Stairs Difficulty no     Dressing/Bathing Difficulty no     Equipment Currently Used at Home other (see comments)     Readmission within 30 days? No     Patient currently being followed by outpatient case management? No     Do you take prescription  medications? Yes     Do you have prescription coverage? Yes     Do you have any problems affording any of your prescribed medications? No     Is the patient taking medications as prescribed? yes     Who is going to help you get home at discharge? Virginia Smith (Spouse)  [phone]     How do you get to doctors appointments? family or friend will provide     Are you on dialysis? No     Do you take coumadin? No     Discharge Plan A Home with family     Discharge Plan B Home Health     DME Needed Upon Discharge  other (see comments)     Discharge Plan discussed with: Patient     Transition of Care Barriers None        Physical Activity    On average, how many days per week do you engage in moderate to strenuous exercise (like a brisk walk)? Patient declined     On average, how many minutes do you engage in exercise at this level? Patient declined        Financial Resource Strain    How hard is it for you to pay for the very basics like food, housing, medical care, and heating? Not very hard        Housing Stability    In the last 12 months, was there a time when you were not able to pay the mortgage or rent on time? No     At any time in the past 12 months, were you homeless or living in a shelter (including now)? No        Transportation Needs    In the past 12 months, has lack of transportation kept you from medical appointments or from getting medications? No     In the past 12 months, has lack of transportation kept you from meetings, work, or from getting things needed for daily living? No        Food Insecurity    Within the past 12 months, you worried that your food would run out before you got the money to buy more. Never true     Within the past 12 months, the food you bought just didn't last and you didn't have money to get more. Never true        Stress    Do you feel stress - tense, restless, nervous, or anxious, or unable to sleep at night because your mind is troubled all the time - these days? Not at  all        Social Isolation    How often do you feel lonely or isolated from those around you?  Never        Alcohol Use    Q1: How often do you have a drink containing alcohol? Patient declined     Q2: How many drinks containing alcohol do you have on a typical day when you are drinking? Patient declined     Q3: How often do you have six or more drinks on one occasion? Patient declined        Utilities    In the past 12 months has the electric, gas, oil, or water company threatened to shut off services in your home? No        Health Literacy    How often do you need to have someone help you when you read instructions, pamphlets, or other written material from your doctor or pharmacy? Never                      SW met with pt to discuss discharge planning.  Pt lives with spouse and may need assistance with ambulation and ADLs.  Pt will have transportation and assistance from spouse at discharge.  Discharge Plan A and Plan B have been determined by review of patient's clinical status, future medical and therapeutic needs, and coverage/benefits for post-acute care in coordination with multidisciplinary team members.        Mellisa Norwood MSW, CSW

## 2025-06-21 NOTE — SUBJECTIVE & OBJECTIVE
Past Medical History:   Diagnosis Date    Anxiety     Diabetes mellitus, type 2     GERD (gastroesophageal reflux disease)     Hyperlipidemia     Hypertension     Obesity (BMI 30-39.9)      Past Surgical History:   Procedure Laterality Date    HERNIA REPAIR      ULNAR NERVE REPAIR Left       Medications Ordered Prior to Encounter[1]   Allergies: Ether for anesthesia  Family History   Problem Relation Name Age of Onset    Hypertension Mother      Hyperlipidemia Mother      Cancer Father      Diabetes Sister      Diabetes Brother      Stroke Neg Hx      Heart disease Neg Hx       Social History[2]  Review of Systems   Constitutional:  Positive for fatigue. Negative for chills, fever and unexpected weight change.   HENT: Negative.     Eyes: Negative.    Respiratory: Negative.  Negative for cough and shortness of breath.    Cardiovascular: Negative.  Negative for chest pain and palpitations.   Gastrointestinal: Negative.    Endocrine: Negative.    Genitourinary: Negative.  Negative for difficulty urinating, dysuria, flank pain, frequency and urgency.   Musculoskeletal: Negative.  Negative for myalgias, neck pain and neck stiffness.   Skin: Negative.  Negative for pallor.   Allergic/Immunologic: Negative.    Neurological:  Positive for numbness (Baseline RUE sensation loss) and headaches (Minimal). Negative for tremors, facial asymmetry, speech difficulty, weakness and light-headedness.   Hematological: Negative.  Does not bruise/bleed easily.   Psychiatric/Behavioral: Negative.       Objective:     Vitals:    Temp: 98 °F (36.7 °C)  Pulse: 100  BP: 124/83  MAP (mmHg): 94  Resp: 18  SpO2: 96 %    Temp  Min: 98 °F (36.7 °C)  Max: 99.2 °F (37.3 °C)  Pulse  Min: 75  Max: 120  BP  Min: 112/53  Max: 130/75  MAP (mmHg)  Min: 77  Max: 94  Resp  Min: 18  Max: 22  SpO2  Min: 96 %  Max: 99 %    No intake/output data recorded.            Physical Exam  Vitals and nursing note reviewed.   Constitutional:       General: He is not in  acute distress.     Appearance: Normal appearance. He is not ill-appearing.      Comments: Resting comfortably in bed   HENT:      Head: Normocephalic and atraumatic.      Nose: Nose normal.      Mouth/Throat:      Mouth: Mucous membranes are moist.      Pharynx: Oropharynx is clear.   Eyes:      Pupils: Pupils are equal, round, and reactive to light.   Cardiovascular:      Rate and Rhythm: Regular rhythm. Tachycardia present.      Pulses: Normal pulses.   Pulmonary:      Effort: Pulmonary effort is normal. No respiratory distress.      Breath sounds: Normal breath sounds.   Abdominal:      General: Abdomen is flat. There is no distension.      Palpations: Abdomen is soft.      Tenderness: There is no abdominal tenderness.   Musculoskeletal:         General: No swelling or tenderness. Normal range of motion.      Cervical back: Neck supple. No rigidity or tenderness.   Skin:     General: Skin is warm and dry.      Capillary Refill: Capillary refill takes less than 2 seconds.   Neurological:      Mental Status: He is alert.      Comments:   E4 V5 M6  Alert. Oriented x4. Speech fluent - no dysarthria or aphasia. Following commands.   PERRLA. EOMI. Visual fields intact   No facial asymmetry.  Tongue midline. Shoulder shrug symmetric.  Motor:  RUE 5/5  RLE 5/5  LUE 5/5  LLE 5/5  No drift   Very minimal dysmetria in RUE  Baseline RUE numbness        Gait deferred    Today I personally reviewed pertinent medications, lines/drains/airways, imaging, cardiology results, laboratory results, microbiology results,         [1]   Current Facility-Administered Medications on File Prior to Encounter   Medication Dose Route Frequency Provider Last Rate Last Admin    leuprolide acetate (6 month) injection 45 mg  45 mg Intramuscular Q6 Months Alexandru Zamora Jr., MD        [DISCONTINUED] allopurinoL tablet  50 mg Oral  Provider, Generic External Data        [DISCONTINUED] ferrous sulfate tablet   Oral  Provider, Generic External  Data        [DISCONTINUED] GENERIC EXTERNAL MEDICATION     Provider, Generic External Data        [DISCONTINUED] GENERIC EXTERNAL MEDICATION     Provider, Generic External Data        [DISCONTINUED] GENERIC EXTERNAL MEDICATION     Provider, Generic External Data        [DISCONTINUED] GENERIC EXTERNAL MEDICATION     Provider, Generic External Data        [DISCONTINUED] mirtazapine tablet  7.5 mg Oral  Provider, Generic External Data        [DISCONTINUED] tiZANidine tablet  2 mg Oral Q6H PRN Provider, Generic External Data        [DISCONTINUED] vitamin D 1000 units tablet  1,000 Units Oral  Provider, Generic External Data         Current Outpatient Medications on File Prior to Encounter   Medication Sig Dispense Refill    acetaminophen (TYLENOL) 500 MG tablet Take 500 mg by mouth every 6 (six) hours as needed for Pain.      amLODIPine (NORVASC) 10 MG tablet Take 0.5 tablets by mouth once daily.      apixaban (ELIQUIS) 5 mg Tab Take 5 mg by mouth 2 (two) times daily.      clonazePAM (KLONOPIN) 0.5 MG tablet Take 1 tablet by mouth daily as needed.      diclofenac sodium (VOLTAREN) 1 % Gel Apply topically.      efinaconazole (JUBLIA) 10 % Karla Apply topically.      Hydrocortisone 1%-Econazole 1% Topical Cream Apply topically.      levocetirizine (XYZAL) 5 MG tablet Take 1 tablet (5 mg total) by mouth every evening. 30 tablet 11    LIDOcaine (LIDODERM) 5 % Apply topically.      losartan (COZAAR) 50 MG tablet Take 50 mg by mouth once daily.      megestroL (MEGACE) 40 MG Tab Take 1 tablet (40 mg total) by mouth once daily. 30 tablet 11    metformin (GLUCOPHAGE) 500 MG tablet Take 500 mg by mouth 2 (two) times daily with meals.      miconazole NITRATE 2 % (MICOTIN) 2 % top powder Apply topically.      omeprazole (PRILOSEC) 20 MG capsule Take 20 mg by mouth 2 (two) times daily.       pravastatin (PRAVACHOL) 40 MG tablet Take 40 mg by mouth once daily.      semaglutide (OZEMPIC SUBQ) Inject into the skin.      semaglutide  (OZEMPIC) 2 mg/dose (8 mg/3 mL) PnIj Inject 2mg into the skin once weekly 3 mL 0    tadalafil (CIALIS) 20 MG Tab Use one tablet every 36 hours 5 tablet 5    tamsulosin (FLOMAX) 0.4 mg Cap Take 2 capsules (0.8 mg total) by mouth once daily. 180 capsule 3   [2]   Social History  Tobacco Use    Smoking status: Never     Passive exposure: Never    Smokeless tobacco: Never   Substance Use Topics    Alcohol use: Yes     Alcohol/week: 0.0 standard drinks of alcohol     Comment: social    Drug use: No

## 2025-06-21 NOTE — ASSESSMENT & PLAN NOTE
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

## 2025-06-21 NOTE — ASSESSMENT & PLAN NOTE
-Stroke risk factor. Patient has an IVC filter in place that may now be thrombosed.   -Admitted to [facility] for close monitoring as he was started on minimal intensity heparin gtt.

## 2025-06-21 NOTE — ASSESSMENT & PLAN NOTE
79 y.o. male with a significant medical history of prostate CA w/brain mets, PE (was on Eliquis until recent ICH), RLE DVT(has IVC filter), HTN, HLD, DM II, recently admitted to [facility] 6/9 for brainstem IVH and cav mal, discharged to [facility] re[facility], who re presented to the hospital for evaluation of N/V    MRI brain w/wo: 0.8 cm left doral midbrain/rostral hal cav mal with ICH, late subacute blood, likely corresponding with bleed from initial hospitalization on 6/9      - admitted to ICU,   - q1 hour neurochecks  - SBP < 140  - no further imaging at this time  - continue to monitor clinically for symptomatic improvement  - PT/INR/PTT  - eliquis held since last hospitalization 6/9 for initial ICH  - OK for SQH given subacute nature of bleed  - OK for PT/OT OOB

## 2025-06-21 NOTE — ASSESSMENT & PLAN NOTE
Franko Smith is a 79 y.o. male with a significant medical history of prostate CA w/brain mets, PE (was on Eliquis until recent ICH), RLE DVT(has IVC filter), HTN, HLD, DM II who presented to the hospital for evaluation of N/V. The patient was seen at [facility] for similar symptoms on 6/9 after having R sided numbness and tingling with dizziness for 2 days prior. MRI Brain at that time was negative for mass. He was found to have an ICH and evaluated by Neurosurgery who felt the findings were r/t cavernous malformations. The patient was transferred to [facility] and was started on SQH for DVT prophylaxis. He began to complain of persistent N/V and intermittent dizziness.     On initial exam the patient is lethargic but arousable. He endorses nausea and dizziness but denies recent vomiting. He has mild aphasia and RLE weakness. NIHSS 4. He is admitted to [facility].    Antithrombotics for secondary stroke prevention: Anticoagulants: Heparin protocol without bolus    Statins for secondary stroke prevention and hyperlipidemia, if present:   Statins: Atorvastatin- 40 mg daily    Aggressive risk factor modification: HTN, DM, HLD, Cancer     Rehab efforts: The patient has been evaluated by a stroke team provider and the therapy needs have been fully considered based off the presenting complaints and exam findings. The following therapy evaluations are needed: PT evaluate and treat, OT evaluate and treat, SLP evaluate and treat, PM&R evaluate for appropriate placement    Diagnostics ordered/pending: None     VTE prophylaxis: Mechanical prophylaxis: Place SCDs  None: Reason for No Pharmacological VTE Prophylaxis: Currently on anticoagulation    BP parameters: ICH: SBP Goal Range 130-150

## 2025-06-21 NOTE — PLAN OF CARE
[facility] Care Plan  POC reviewed with Franko Smith and family at 1400. Patient and Family verbalized understanding. Questions and concerns addressed. No acute events today. Pt progressing toward goals. Will continue to monitor. See below and flowsheets for full assessment and VS info.     -New admit  -started argatroban gtt @2 for Bi-Lat-LE DVT  -NS 75ml  -Ab, Bi-lat-UE and LE US done  -Accute check Q6  -Aptt Q2          Is this a stroke patient? no    Neuro:  Vinicio Coma Scale  Best Eye Response: 4-->(E4) spontaneous  Best Motor Response: 6-->(M6) obeys commands  Best Verbal Response: 5-->(V5) oriented  Vinicio Coma Scale Score: 15  Pupil PERRLA: yes     24 hr Temp:  [98 °F (36.7 °C)-99.2 °F (37.3 °C)]     CV:   Rhythm: normal sinus rhythm  BP goals:   SBP < 150  MAP > 65    Resp:           Plan: N/A    GI/:     Diet/Nutrition Received: NPO  Last Bowel Movement: 06/20/25  Voiding Characteristics: other (see comments) (urinal)    Intake/Output Summary (Last 24 hours) at 6/21/2025 1835  Last data filed at 6/21/2025 1745  Gross per 24 hour   Intake 440 ml   Output 400 ml   Net 40 ml          Labs/Accuchecks:  Recent Labs   Lab 06/21/25  1156   WBC 6.90   RBC 2.94*   HGB 8.2*   HCT 25.8*   PLT 80*      Recent Labs   Lab 06/21/25  0625 06/21/25  1355     --    K 4.4  --    CO2 19*  --      --    BUN 30*  --    CREATININE 1.9* 1.5*   ALKPHOS 74 69   ALT 15 13   AST 12 10*   BILITOT 1.5* 1.3*      Recent Labs   Lab 06/21/25  1156 06/21/25  1355   PROTIME 13.8*  --    INR 1.3*  --    APTT 31.6 37.8*      Recent Labs   Lab 06/19/25  0642          Electrolytes: N/A - electrolytes WDL  Accuchecks: Q6H    Gtts:   0.9% NaCl   Intravenous Continuous 75 mL/hr at 06/21/25 1204 New Bag at 06/21/25 1204    argatroban in 0.9 % sod chlor  0-10 mcg/kg/min Intravenous Continuous 12.5 mL/hr at 06/21/25 1433 2 mcg/kg/min at 06/21/25 1433       LDA/Wounds:    Johnathan Risk Assessment  Sensory Perception: 4-->no  impairment  Moisture: 4-->rarely moist  Activity: 1-->bedfast  Mobility: 3-->slightly limited  Nutrition: 3-->adequate  Friction and Shear: 3-->no apparent problem  Johnathan Score: 18    Is your johnathan score 12 or less? no            Restraints:        WCTM  Problem: Adult Inpatient Plan of Care  Goal: Plan of Care Review  Outcome: Progressing  Goal: Patient-Specific Goal (Individualized)  Outcome: Progressing  Goal: Optimal Comfort and Wellbeing  Outcome: Progressing  Goal: Readiness for Transition of Care  Outcome: Progressing     Problem: Stroke, Intracerebral Hemorrhage  Goal: Optimal Coping  Outcome: Progressing  Goal: Effective Bowel Elimination  Outcome: Progressing  Goal: Optimal Cerebral Tissue Perfusion  Outcome: Progressing  Goal: Optimal Functional Ability  Outcome: Progressing  Goal: Optimal Nutrition Intake  Outcome: Progressing  Goal: Effective Oxygenation and Ventilation  Outcome: Progressing  Goal: Improved Sensorimotor Function  Outcome: Progressing     Problem: Diabetes Comorbidity  Goal: Blood Glucose Level Within Targeted Range  Outcome: Progressing     Problem: Fall Injury Risk  Goal: Absence of Fall and Fall-Related Injury  Outcome: Progressing     Problem: Skin Injury Risk Increased  Goal: Skin Health and Integrity  Outcome: Progressing

## 2025-06-21 NOTE — SUBJECTIVE & OBJECTIVE
Past Medical History:   Diagnosis Date    Anxiety     Diabetes mellitus, type 2     GERD (gastroesophageal reflux disease)     Hyperlipidemia     Hypertension     Obesity (BMI 30-39.9)        Past Surgical History:   Procedure Laterality Date    HERNIA REPAIR      ULNAR NERVE REPAIR Left        Social History[1]    Family History   Problem Relation Name Age of Onset    Hypertension Mother      Hyperlipidemia Mother      Cancer Father      Diabetes Sister      Diabetes Brother      Stroke Neg Hx      Heart disease Neg Hx         Review of patient's allergies indicates:   Allergen Reactions    Ether for anesthesia Nausea And Vomiting         Medications:  Continuous Infusions:  Scheduled Meds:   polyethylene glycol  17 g Oral Daily    pravastatin  40 mg Oral Daily    senna-docusate  1 tablet Oral BID    tamsulosin  0.8 mg Oral Daily     PRN Meds:  Current Facility-Administered Medications:     acetaminophen, 650 mg, Oral, Q6H PRN    dextrose 50%, 12.5 g, Intravenous, PRN    glucagon (human recombinant), 1 mg, Intramuscular, PRN    hydrALAZINE, 10 mg, Intravenous, Q4H PRN    insulin aspart U-100, 0-5 Units, Subcutaneous, Q6H PRN    labetalol, 10 mg, Intravenous, Q4H PRN    ondansetron, 4 mg, Intravenous, Q4H PRN    sodium chloride 0.9%, 10 mL, Intravenous, PRN     Review of Systems  Objective:     Weight: 104.3 kg (229 lb 15 oz)  Body mass index is 31.19 kg/m².  Vital Signs (Most Recent):  Temp: 99 °F (37.2 °C) (06/21/25 0758)  Pulse: 97 (06/21/25 0758)  Resp: (!) 23 (06/21/25 0758)  BP: 118/61 (06/21/25 0758)  SpO2: 99 % (06/21/25 0758) Vital Signs (24h Range):  Temp:  [98 °F (36.7 °C)-99.2 °F (37.3 °C)] 99 °F (37.2 °C)  Pulse:  [] 97  Resp:  [18-23] 23  SpO2:  [96 %-99 %] 99 %  BP: (106-130)/(53-83) 118/61                                 Physical Exam         Neurosurgery Physical Exam    Physical Exam:    Constitutional: drowsy from ativan for MRI    HEENT: atraumatic/normocephalic    Cardiovascular: Regular  rhythm.     Pulm: aerating well, saturating well    Abdominal: Soft.     Psych/Behavior: He is alert.     E3V4M6,   Aox3, mild confusion  PERRL  EOMI  Face Symmetric  Tongue midline  FC x4 full strength       Significant Labs:  Recent Labs   Lab 06/20/25  0544 06/20/25  2215 06/21/25  0625   * 108 121*   * 135* 136   K 4.3 4.7 4.4    104 105   CO2 22* 19* 19*   BUN 27* 28* 30*   CREATININE 1.6* 1.6* 1.9*   CALCIUM 8.9 8.9 8.9   MG 2.6  --  2.5     Recent Labs   Lab 06/20/25  0544 06/20/25 2215 06/20/25 2226 06/21/25  0625   WBC 5.70 6.92  --  6.30   HGB 8.8* 9.0*  --  8.6*   HCT 28.2* 28.3* 28* 27.0*   PLT 83* 128*  --  72*     Recent Labs   Lab 06/21/25  0147 06/21/25  0625   INR 1.3* 1.3*   APTT  --  35.2*     Microbiology Results (last 7 days)       ** No results found for the last 168 hours. **          All pertinent labs from the last 24 hours have been reviewed.    Significant Diagnostics:  I have reviewed and interpreted all pertinent imaging results/findings within the past 24 hours.       [1]   Social History  Socioeconomic History    Marital status:     Number of children: 5   Occupational History    Occupation: Retired     Tobacco Use    Smoking status: Never     Passive exposure: Never    Smokeless tobacco: Never   Substance and Sexual Activity    Alcohol use: Yes     Alcohol/week: 0.0 standard drinks of alcohol     Comment: social    Drug use: No    Sexual activity: Yes     Partners: Female     Social Drivers of Health     Financial Resource Strain: Low Risk  (6/16/2025)    Received from [facility]    Overall Financial Resource Strain (CARDIA)     Difficulty of Paying Living Expenses: Not hard at all   Food Insecurity: No Food Insecurity (6/16/2025)    Received from [facility]    Hunger Vital Sign     Worried About Running Out of Food in the Last Year: Never true     Ran Out of Food in the Last Year: Never true   Transportation Needs: No Transportation Needs  (6/13/2025)    Received from [facility]     Has lack of transportation kept you from medical appointments or from getting medications?: No     Has lack of transportation kept you from meetings, work, or from getting things needed for daily living?: No   Stress: No Stress Concern Present (6/13/2025)    Received from [facility] - Occupational Stress Questionnaire     Feeling of Stress : Not at all   Housing Stability: Low Risk  (6/16/2025)    Received from [facility]    Housing Stability Vital Sign     Unable to Pay for Housing in the Last Year: No     Number of Times Moved in the Last Year: 0     Homeless in the Last Year: No

## 2025-06-21 NOTE — ED TRIAGE NOTES
Report received from Musa Doherty RN. Assume care of pt. Pt resting comfortably and independently repositioned in stretcher with bed locked in lowest position for safety. NAD noted at this time. Respirations even and unlabored and visible chest rise noted.  Patient offered bathroom assistance and denies need at this time. Pt instructed to call if assistance is needed. Pt on continuous cardiac, BP, and O2 monitoring. Call light within reach. No needs at this time. Will continue to monitor.

## 2025-06-21 NOTE — ED NOTES
Patient on stretcher, Bed placed in low/locked position, side rails up x 2, call light is within reach of patient or family, Patient placed into position of comfort. Patient in NAD and offers no complaints at this time. Pain goals addressed. Will continue to monitor.

## 2025-06-22 ENCOUNTER — TELEPHONE (OUTPATIENT)
Dept: NEUROSURGERY | Facility: CLINIC | Age: 80
End: 2025-06-22
Payer: MEDICARE

## 2025-06-22 NOTE — TELEPHONE ENCOUNTER
----- Message from Roderick Kyle sent at 6/22/2025  9:42 AM CDT -----  Please schedule pt for 2 week fu with Dr. Christine. Thank you.

## 2025-06-22 NOTE — PLAN OF CARE
[facility] Care Plan  POC reviewed with Franko Smith and family at 1400. Patient and Family verbalized understanding. Questions and concerns addressed. No acute events today. Pt progressing toward goals. Will continue to monitor. See below and flowsheets for full assessment and VS info.             Is this a stroke patient? {STROKE PATIENT?:07459}    Neuro:  Vinicio Coma Scale  Best Eye Response: 4-->(E4) spontaneous  Best Motor Response: 6-->(M6) obeys commands  Best Verbal Response: 5-->(V5) oriented  [place] Coma Scale Score: 15  Assessment Qualifiers: Patient not sedated/intubated, No eye obstruction present  Pupil PERRLA: yes     24 hr Temp:  [98.1 °F (36.7 °C)-98.9 °F (37.2 °C)]     CV:   Rhythm: normal sinus rhythm  BP goals:   SBP < {SBP <:04584}  MAP > {MAPS:32373}    Resp:           Plan: {resp plan:73736}    GI/:     Diet/Nutrition Received: consistent carb/diabetic diet  Last Bowel Movement: 06/20/25  Voiding Characteristics: voids spontaneously without difficulty    Intake/Output Summary (Last 24 hours) at 6/22/2025 1746  Last data filed at 6/22/2025 0615  Gross per 24 hour   Intake 1318.06 ml   Output 500 ml   Net 818.06 ml     Unmeasured Output  Unmeasured Urine Occurrence: 2    Labs/Accuchecks:  Recent Labs   Lab 06/21/25  2357   WBC 6.21   RBC 2.80*   HGB 7.7*   HCT 24.8*   PLT 90*      Recent Labs   Lab 06/21/25  2357 06/22/25  1340 06/22/25  1636     --   --    K 4.2  --   --    CO2 19*  --   --      --   --    BUN 28*  --   --    CREATININE 1.5*  --  1.5*   ALKPHOS 64   < > 70   ALT 14   < > 14   AST 13   < > 12   BILITOT 1.2*   < > 1.1*    < > = values in this interval not displayed.      Recent Labs   Lab 06/21/25  1156 06/21/25  1355 06/22/25  1636   PROTIME 13.8*  --   --    INR 1.3*  --   --    APTT 31.6   < > 49.4*    < > = values in this interval not displayed.      Recent Labs   Lab 06/19/25  0642          Electrolytes: {electrolyte replacement:87168}  Accuchecks:  {accuchecks:88810}    Gtts:   argatroban in 0.9 % sod chlor  0-10 mcg/kg/min Intravenous Continuous 6.3 mL/hr at 06/22/25 1634 1 mcg/kg/min at 06/22/25 1634       LDA/Wounds:    Johnathan Risk Assessment  Sensory Perception: 4-->no impairment  Moisture: 4-->rarely moist  Activity: 1-->bedfast  Mobility: 3-->slightly limited  Nutrition: 3-->adequate  Friction and Shear: 3-->no apparent problem  Johnathan Score: 18    Is your johnathan score 12 or less? {PPyesno:71191}            Restraints:        WCTM

## 2025-06-22 NOTE — PLAN OF CARE
Problem: Occupational Therapy  Goal: Occupational Therapy Goal  Description: Goals to be met by: 7/6/2025     Patient will increase functional independence with ADLs by performing:    UE Dressing with Set-up Assistance.  LE Dressing with Minimal Assistance.  Grooming while standing at sink with Stand-by Assistance.  Toileting from toilet with Stand-by Assistance for hygiene and clothing management.   Supine to sit with Modified [place].  Step transfer with Stand-by Assistance with RW.  Toilet transfer to toilet with Stand-by Assistance with RW.    Outcome: Progressing     Pt evaluated and OT goals established.

## 2025-06-22 NOTE — CONSULTS
Hematology Oncology Consult Note    Inpatient consult to Hematology  Consult performed by: Jose Ramon Silverman MD  Consult ordered by: Nicko Cruz, AGACN-BC        SUBJECTIVE:     History of Present Illness:  Patient is a 79 y.o. male with a PMH of prostate CA (diagnosed 2023 s/p radiation), PE (diagnosed in Feb of 2024, was previously on Eliquis until recent IPH), RLE DVT transferred from rehab and admitted to [facility] for nausea, vomiting and new bilateral LE DVTs extending to IVC filter in setting of above recent brain hemorrhages. CT head showed grossly stable appearance of numerous parenchymal hemorrhages.     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 He is chronically on Eliquis for prior DVTs which was reversed during admission at [facility], He was discharge to rehab 06/13 and has been receiving SubQ Heparin for chemical dvt ppx during that stay and when at inpatient rehab. He had a notable drop in platelet count from 148k (June 13) to 72k (June 21) leading to HIT panel being ordered at their facility. 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 CT abdomen/pelvis however with substantial filling defects above and around IVC filter consistent with thrombus which extend inferiorly to limbs all the way to calves consistent with DVTs (confirmed on ultrasound). 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 Patient has been started on argatroban and Hematology consulted for suspected HIT.    Review of patient's allergies indicates:   Allergen Reactions    Ether for anesthesia Nausea And Vomiting     Past Medical History:   Diagnosis Date    Anxiety     Diabetes mellitus, type 2     GERD (gastroesophageal  reflux disease)     Hyperlipidemia     Hypertension     Obesity (BMI 30-39.9)      Past Surgical History:   Procedure Laterality Date    HERNIA REPAIR      ULNAR NERVE REPAIR Left      Family History   Problem Relation Name Age of Onset    Hypertension Mother      Hyperlipidemia Mother      Cancer Father      Diabetes Sister      Diabetes Brother      Stroke Neg Hx      Heart disease Neg Hx       Social History[1]  Review of Systems   Constitutional:  Positive for malaise/fatigue.   Gastrointestinal:  Positive for abdominal pain, nausea and vomiting.   Neurological:  Positive for headaches.     OBJECTIVE:     Vital Signs:  Temp:  [98.1 °F (36.7 °C)-98.9 °F (37.2 °C)]   Pulse:  []   Resp:  [15-38]   BP: (104-133)/(60-85)   SpO2:  [98 %-100 %]     Physical Exam  Constitutional:       General: He is not in acute distress.     Appearance: He is not ill-appearing.   Cardiovascular:      Rate and Rhythm: Tachycardia present.      Heart sounds: Normal heart sounds.   Pulmonary:      Effort: Pulmonary effort is normal.      Breath sounds: Normal breath sounds.   Abdominal:      General: Bowel sounds are normal.      Palpations: Abdomen is soft.   Musculoskeletal:      Right lower leg: No edema.      Left lower leg: No edema.   Neurological:      Mental Status: He is oriented to person, place, and time.       Laboratory:  I have reviewed all pertinent lab results within the past 24 hours.      Diagnostic Results:  Labs: Reviewed      ASSESSMENT/PLAN:     HIT  [facility]  Bilateral DVT    An 79-year-old male with a past medical history of prostate cancer (diagnosed in 2023, treated with radiation), PE (diagnosed February 2024, previously on Eliquis until recent ICH related to cavernomas), and chronic RLE DVT, was transferred from a rehabilitation facility and admitted to the [facility] with nausea, vomiting, and newly diagnosed bilateral lower extremity DVTs extending from the IVC filter into the calf veins bilaterally in the context  of recent brain hemorrhages.    CT head revealed a grossly stable appearance of multiple parenchymal hemorrhages. Brain MRI 06/21 shows early subacute parenchymal hemorrhages in the left thalamus and left cerebellar hemisphere and numerous chronic hemorrhages and micro hemorrhages throughout bilateral cerebral and cerebellar hemispheres      He was recently hospitalized at [facility] from 06/09 to 06/13 for multifocal intracerebral hemorrhages, suspected to be secondary to cavernomas. During that admission, his chronic anticoagulation with Eliquis for prior DVTs was reversed. He was discharged to rehab. 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 The anti-heparin antibody panel returned moderately strongly positive (1.15); WADE is pending.    He has been started on argatroban, Platelet counts is improving (72 ? 80 ? 82 ? 90). The etiology of the new DVTs--whether due to HIT or related to the IVC filter is unclear.     Recommendations:   -Continue argatroban at this time.  -Monitor and follow up on pending WADE results.  -Recommend removal of the IVC filter given its prothrombotic risk.  -Consider transitioning to Eliquis once platelet count normalizes and intracranial hemorrhages remain stable.  -Recommend ongoing anticoagulation for HIT to reduce the risk of further thrombosis.    Discussed with Dr. Bejarano      We will continue to follow. Let us know if any questions.      Jose Ramon Silverman MD  Hematology/Oncology PGY-IV          [1]   Social History  Tobacco Use    Smoking status: Never     Passive exposure: Never    Smokeless tobacco: Never   Substance Use Topics    Alcohol use: Yes     Alcohol/week: 0.0 standard drinks of alcohol     Comment: social    Drug use: No

## 2025-06-22 NOTE — PLAN OF CARE
Problem: Physical Therapy  Goal: Physical Therapy Goal  Description: Goals to be met by: 25     Patient will increase functional independence with mobility by performin. Supine to sit with [place]  2. Sit to supine with [place]  3. Sit to stand transfer with [place]  4. Bed to chair transfer with Modified [place] using LRAD  5. Gait  x 50 feet with [place] using LRAD  6. LE exercise program x15 reps with independent     Outcome: Progressing   PT eval completed and goals established. Pt will begin PT POC, progressing as tolerated.    Regular rate & rhythm, normal S1, S2; no murmurs, gallops or rubs; no S3, S4

## 2025-06-22 NOTE — PT/OT/SLP EVAL
Physical Therapy Co-Evaluation with OT     Patient Name:  Franko Smith   MRN:  [id number]    Co-evaluation with OT performed due to patient complexity and deficits, requiring two skilled therapists to appropriately and safely assess patient's strength and endurance while facilitating functional tasks in addition to accommodating for patient's activity tolerance.    Recommendations:     Discharge Recommendations: High Intensity Therapy   Discharge Equipment Recommendations: walker, rolling   Barriers to discharge: Inaccessible home and Decreased caregiver support    Assessment:     Franko Smith is a 79 y.o. male admitted with a medical diagnosis of ICH (intracerebral hemorrhage).  He presents with the following impairments/functional limitations: weakness, impaired endurance, impaired self care skills, impaired functional mobility, gait instability, pain, impaired cardiopulmonary response to activity. Patient participated well in session. He was able to ambulate with heavy UE use on walker and overall required light assistance for mobility and balance. Session limited by decreased endurance and fatigue, HR ranging 116-146 bpm with activity. Recommending high intensity post-acute therapy after discharge to maximize benefits; patient could tolerate 3xhours/day of combined therapies.    Patient is safe to ambulate/transfer with nursing (assist of 1), encouraged to sit up in chair when able.      Rehab Prognosis: Good; patient would benefit from acute skilled PT services to address these deficits and reach maximum level of function.    Recent Surgery: * No surgery found *      Plan:     During this hospitalization, patient to be seen 4 x/week to address the identified rehab impairments via gait training, therapeutic activities, therapeutic exercises, neuromuscular re-education and progress toward the following goals:    Plan of Care Expires:  07/13/25    Subjective     Chief Complaint: fatigue  Patient/Family  "Comments/goals: "My legs are just stiff"   Pain/Comfort:  Pain Rating 1: 0/10  Location - Side 1: Bilateral  Location - Orientation 1: generalized  Location 1: hip (with standing activity)  Pain Addressed 1: Distraction, Reposition  Pain Rating Post-Intervention 1: 0/10    Patients cultural, spiritual, Confucianist conflicts given the current situation: no    Living Environment: Patient lives with wife in 2 level condo, 1STE then either 17 steps to second level or elevator access. WIS with chair and grab bars.   Prior Level of Function: independent with quad cane up until ~2 weeks ago.  DME used: cane, quad  DME owned (not currently used): none  Assistance upon Discharge: wife     Objective:     Communicated with RN prior to session.  Patient found HOB elevated with telemetry, blood pressure cuff, pulse ox (continuous), peripheral IV  upon PT entry to room.    General Precautions: Standard, fall  Orthopedic Precautions:N/A   Braces: N/A  Respiratory Status: Room air    Exams:  Cognitive Exam:  Patient is oriented to Person, Place, Time, and Situation  Sensation:    -       Intact  RLE ROM: WNL  RLE Strength: WFL  LLE ROM: WNL  LLE Strength: WFL    Functional Mobility:  Bed Mobility:     Scooting: contact guard assistance  Supine to Sit: stand by assistance  HEB elevated, to R  Sit to Supine: contact guard assistance    Transfers:     Sit to Stand:  contact guard assistance with rolling walker  Stand to sit: [place] for balance, weight shifting to L to correct LOB    Gait: patient ambulated 7', 7' with CGA and 2ww  Deviations Noted: decreased gait speed, decreased step height R,L, and decreased step length R, L, heavy UE use on walker    Balance:   Sitting static: [place]-SBA  Sitting dynamic: [place]  Standing static: CGA with 2ww      AM-PAC 6 CLICK MOBILITY  Total Score:18       Treatment & Education:  Education: patient educated on POC, need for therapy, safety with mobility, discharge recommendations and equipment " recommendations. Patient verbalized understanding of all topics.   Verbal and tactile cues provided during mobility for walker management and safety including hand placement on walker vs chair during transfers and positioning of walker in relation to body.   Verbal and tactile cues with assist during STS transfer for optimal VIRI prior to transfer, forward weight shift to initiate, to engage LE mm, extend hips forward and bring head and chest up to achieve full upright stance.    Cues during ambulation for sequencing, weight shifting, upright posture throughout, appropriate step length and height.      Patient left HOB elevated with all lines intact and call button in reach.    GOALS:   Multidisciplinary Problems       Physical Therapy Goals          Problem: Physical Therapy    Goal Priority Disciplines Outcome Interventions   Physical Therapy Goal     PT, PT/OT Progressing    Description: Goals to be met by: 25     Patient will increase functional independence with mobility by performin. Supine to sit with [place]  2. Sit to supine with [place]  3. Sit to stand transfer with [place]  4. Bed to chair transfer with Modified [place] using LRAD  5. Gait  x 50 feet with [place] using LRAD  6. LE exercise program x15 reps with independent                          DME Justifications:   Franko's mobility limitation cannot be sufficiently resolved by the use of a cane. His functional mobility deficit can be sufficiently resolved with the use of a Rolling Walker. Patient's mobility limitation significantly impairs their ability to participate in one of more activities of daily living.  The use of a RW will significantly improve the patient's ability to participate in MRADLS and the patient will use it on regular basis in the home.    History:     Past Medical History:   Diagnosis Date    Anxiety     Diabetes mellitus, type 2     GERD (gastroesophageal reflux disease)     Hyperlipidemia      Hypertension     Obesity (BMI 30-39.9)        Past Surgical History:   Procedure Laterality Date    HERNIA REPAIR      ULNAR NERVE REPAIR Left        Time Tracking:     PT Received On: 06/22/25  PT Start Time: 1058     PT Stop Time: 1121  PT Total Time (min): 23 min     Billable Minutes: Evaluation 13 minutes and Therapeutic Exercise 10 minutes      06/22/2025

## 2025-06-22 NOTE — ASSESSMENT & PLAN NOTE
History of  Diagnosed in 2023, s/p radiation therapy, was previously on hormonal therapy (recently discontinued once admitted to [facility] earlier this month)  CT chest/abdomen/pelvis with hepatic and renal lesions, negative for other primary malignancy  Generalized muscle weakness due to cancer and hormonal therapy  Follows regularly with Oncology

## 2025-06-22 NOTE — HOSPITAL COURSE
06/22/2025 Pt is HIT positive. Heparin gtt discontinued and started on argatroban yesterday. Therapeutic CTH stable. WADE pending. Plt improved to 90. Hematology consulted and recommending removal of the IVC filter given its prothrombotic risk. IR was consulted for evaluation. ZOE improved, Cr 1.5- discontinue IVF. Stable CTH on therapeutic argatroban. Transition to Eliquis 10mg BID for 6 more days then 5mg BID. Scopolamine patch ordered for nausea.    06/23/2025 Pt transitioned back to argatroban gtt per IR recommendations, PTT therapeutic overnight, went to IR this AM for IVC retrieval and clot removal -> coded in procedure, likely PE from piece of clot breaking off during attempted retrieval. Given IV tPA 50 mg in code, epi x4, ROSC achieved after ~12-15 minutes, please see significant event note for full details. Family updated extensively at bedside s/p code.   06/24/2025 Briefly restarted on argatroban gtt overnight, held 2/2 supratherapeutic PTT. Noted to have stimulus induced myoclonus on exam, hooked up to cEEG -> some epileptiform discharges on R, not time locked to clinical myoclonus. Loaded w/ keppra to prevent seizures given abnormal EEG. Non-responsive off sedation. UOP poor. Oxygenation improved, now on minimal vent settings. Family updated at bedside.   06/25/2025 Supratherapeutic PTT again overnight, CBC w/ drop in Hb to 6.3, given 1 u PRBC. CT C/A/P/thighs obtained to r/o internal bleeding, notably given R thigh markedly larger than L thigh on exam -> CT w/ worsening R pleural effusion, no active hemorrhage. IVFs held, UOP/creatinine improving. Started on pantoprazole 40 mg IV BID for ? Slow GIB, holding on GI consult given pt hemodynamically stable off vasopressor support, pending neuroprognostication s/p cardiac arrest. Holding argatroban gtt given persistent transfusion requirements.   06/26/2025 Extensive GOC conversation held with family, see ACP note for full details. Continuing w/ current  level of care pending family decision. Holding argatroban gtt given downtrending Hb  06/27/2025 Received 1 u PRBCs overnight for Hb 6.7, improved to 7.5 this AM. Neuro exam unchanged. Family electing to transition to comfort based care w/ palliative extubation on 6/28, made DNR/no escalation of care w/ discontinuation of lab draws and neuro checks pending palliative extubation   06/28/2025 NAEON. Pending palliative extubation this evening s/p arrival of additional family to bedside  06/29/2025 Palliative extubation yesterday evening. Hemodynamically stable overnight, did require several pushes of morphine for air hunger. Stable for transfer to [facility] for ongoing EOL care pending bed availability.

## 2025-06-22 NOTE — SUBJECTIVE & OBJECTIVE
Review of Systems   Constitutional:  Positive for fatigue.   HENT:  Negative for trouble swallowing.    Respiratory:  Negative for shortness of breath.    Cardiovascular:  Negative for chest pain.   Gastrointestinal:  Positive for nausea. Negative for vomiting.   Endocrine: Negative.    Genitourinary:  Negative for difficulty urinating.   Musculoskeletal:  Negative for neck pain.   Allergic/Immunologic: Negative.    Neurological:  Positive for numbness (Baseline RUE sensation loss). Negative for weakness and headaches (Minimal).     Objective:     Vitals:    Temp: 98.4 °F (36.9 °C)  Pulse: 94  Rhythm: sinus tachycardia  BP: 126/72  MAP (mmHg): 94  Resp: 20  SpO2: 99 %    Temp  Min: 98.1 °F (36.7 °C)  Max: 98.9 °F (37.2 °C)  Pulse  Min: 82  Max: 129  BP  Min: 104/69  Max: 133/85  MAP (mmHg)  Min: 77  Max: 102  Resp  Min: 15  Max: 38  SpO2  Min: 98 %  Max: 100 %    06/21 0701 - 06/22 0700  In: 1758.1 [P.O.:440; I.V.:1318.1]  Out: 900 [Urine:900]            Physical Exam  Vitals and nursing note reviewed.   Constitutional:       General: He is not in acute distress.     Appearance: Normal appearance. He is not ill-appearing.      Comments: Resting comfortably in bed   HENT:      Head: Normocephalic and atraumatic.      Nose: Nose normal.      Mouth/Throat:      Mouth: Mucous membranes are moist.      Pharynx: Oropharynx is clear.   Eyes:      Conjunctiva/sclera: Conjunctivae normal.      Pupils: Pupils are equal, round, and reactive to light.   Cardiovascular:      Rate and Rhythm: Normal rate and regular rhythm.   Pulmonary:      Effort: Pulmonary effort is normal. No respiratory distress.   Abdominal:      General: There is no distension.      Palpations: Abdomen is soft.      Tenderness: There is no abdominal tenderness.   Musculoskeletal:      Right lower leg: Edema present.      Left lower leg: Edema present.   Skin:     General: Skin is warm and dry.   Neurological:      Mental Status: He is alert.      Comments:  "E4 V5 M6  Alert. Oriented x4. Speech fluent - no dysarthria or aphasia. Following commands.   PERRLA.   EOMI - some L gaze and rotary nystagmus   Visual fields intact   No facial asymmetry.  Tongue midline. Shoulder shrug symmetric.  Motor:  MIK and AG with full strength   Improved dysmetria in RUE  Baseline RUE numbness        Gait deferred    Today I personally reviewed pertinent medications, lines/drains/airways, imaging, cardiology results, laboratory results, microbiology results,    HIT positive 1.15    Laboratory:  CBC:  Recent Labs   Lab 06/21/25  2357   WBC 6.21   RBC 2.80*   HGB 7.7*   HCT 24.8*   PLT 90*   MCV 89   MCH 27.5   MCHC 31.0*     CMP:  Recent Labs   Lab 06/21/25  2357 06/22/25  1340   CALCIUM 7.8*  --    ALBUMIN 3.5 3.4*   PROT 6.3 6.5     --    K 4.2  --    CO2 19*  --      --    BUN 28*  --    CREATININE 1.5*  --    ALKPHOS 64 69   ALT 14 14   AST 13 13   BILITOT 1.2* 1.2*     Recent Labs   Lab 06/21/25  2357   MG 2.3   PHOS 4.5     Coagulation:  Recent Labs   Lab 06/21/25  1156 06/21/25  1355 06/22/25  0812   INR 1.3*  --   --    APTT 31.6   < > 66.9*    < > = values in this interval not displayed.     Lipid Panel:  Recent Labs   Lab 06/21/25  0625   CHOL 111*   HDL 32*   LDLCALC 54.2*   TRIG 124     Endocrine:  Recent Labs     06/21/25  0625   HGBA1C 6.8*   TSH 1.720     ABG:  No results for input(s): "PH", "PCO2", "HCO3", "POCSATURATED", "BE" in the last 72 hours.  Urinalysis:  Recent Labs   Lab 06/21/25  0224   COLORU Yellow   SPECGRAV >=1.030*   PHUR 6.0   OCCULTUA Negative   GLUCUA Negative   PROTEINUA Trace*   BILIRUBINUA Negative         "

## 2025-06-22 NOTE — EICU
Virtual ICU Quality Rounds    Admit Date: 6/20/2025  Hospital Day: 1    ICU Day: 23h    24H Vital Sign Range:  Temp:  [98.1 °F (36.7 °C)-99 °F (37.2 °C)]   Pulse:  []   Resp:  [16-33]   BP: (104-133)/(55-85)   SpO2:  [98 %-100 %]     VICU Surveillance Screening      LDA reconciliation : Yes

## 2025-06-22 NOTE — PROGRESS NOTES
[facility]  Neurocritical Care  Progress Note    Admit Date: 6/20/2025  Service Date: 06/22/2025  Length of Stay: 1    Subjective:     Chief Complaint: ICH (intracerebral hemorrhage)    History of Present Illness: 80 y/o M with a PMH of prostate CA (diagnosed 2023 s/p radiation), PE (diagnosed in Feb of 2024, was previously on Eliquis until recent IPH), RLE DVT, HTN, and HLD who presents to [facility] ED from [facility] with increased nausea/vomiting. He initially was admitted to [facility] w/ R sided numbness/tingling and dizziness for two days prior. He initially reported the numbness started on the R side of his face and had extended to his R hand and R side of his abdomen without any RLE numbness/tingling. 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 Per chart review, he had minimal dysmetria in the RUE while at [facility], but otherwise had no focal deficits. MRI with and without contrast was brain negative for any mass. An IVC filter was placed, and his oral AC was not resumed. He was transferred to a rehab facility, and was started on SQH for DVT PPX. He then transferred to [facility] [place] [facility] ED for complaints of increased nausea, vomiting, and a headache. On arrival to [facility] ED, he was neuro intact. A CT head revealed similar appearance of previously known lesions, and new, small supratentorial lesions were noted. There was a CT abdomen with IV contrast that was completed for malignancy evaluation which was negative for malignancy but did reveal lower extremity DVT extending up to the femoral vein. Apparent filling defect in the IVC above and surrounding the IVC filter could represent thrombus or mixing of IV contrast. BUE and BLE US pending. MRI with and without also pending. NCC was consulted for IPH. Due tot he need for anticoagulation therapy due to DVT and potential IVC filter thrombosis, he will  be admitted to [facility] for close monitoring and higher level of care.    Hospital Course: 06/22/2025 Pt is HIT positive. Hematology consulted and recommending removal of the IVC filter given its prothrombotic risk. IR was consulted for evaluation. WADE pending. Plt improved to 90. ZOE improved, Cr 1.5- discontinue IVF. Stable CTH on therapeutic argatroban. Transition to Eliquis 10mg BID for 6 more days then 5mg BID. Scopolamine patch ordered for nausea.      Review of Systems   Constitutional:  Positive for fatigue.   HENT:  Negative for trouble swallowing.    Respiratory:  Negative for shortness of breath.    Cardiovascular:  Negative for chest pain.   Gastrointestinal:  Positive for nausea. Negative for vomiting.   Endocrine: Negative.    Genitourinary:  Negative for difficulty urinating.   Musculoskeletal:  Negative for neck pain.   Allergic/Immunologic: Negative.    Neurological:  Positive for numbness (Baseline RUE sensation loss). Negative for weakness and headaches (Minimal).     Objective:     Vitals:    Temp: 98.4 °F (36.9 °C)  Pulse: 94  Rhythm: sinus tachycardia  BP: 126/72  MAP (mmHg): 94  Resp: 20  SpO2: 99 %    Temp  Min: 98.1 °F (36.7 °C)  Max: 98.9 °F (37.2 °C)  Pulse  Min: 82  Max: 129  BP  Min: 104/69  Max: 133/85  MAP (mmHg)  Min: 77  Max: 102  Resp  Min: 15  Max: 38  SpO2  Min: 98 %  Max: 100 %    06/21 0701 - 06/22 0700  In: 1758.1 [P.O.:440; I.V.:1318.1]  Out: 900 [Urine:900]            Physical Exam  Vitals and nursing note reviewed.   Constitutional:       General: He is not in acute distress.     Appearance: Normal appearance. He is not ill-appearing.      Comments: Resting comfortably in bed   HENT:      Head: Normocephalic and atraumatic.      Nose: Nose normal.      Mouth/Throat:      Mouth: Mucous membranes are moist.      Pharynx: Oropharynx is clear.   Eyes:      Conjunctiva/sclera: Conjunctivae normal.      Pupils: Pupils are equal, round, and reactive to light.   Cardiovascular:      Rate  "and Rhythm: Normal rate and regular rhythm.   Pulmonary:      Effort: Pulmonary effort is normal. No respiratory distress.   Abdominal:      General: There is no distension.      Palpations: Abdomen is soft.      Tenderness: There is no abdominal tenderness.   Musculoskeletal:      Right lower leg: Edema present.      Left lower leg: Edema present.   Skin:     General: Skin is warm and dry.   Neurological:      Mental Status: He is alert.      Comments: E4 V5 M6  Alert. Oriented x4. Speech fluent - no dysarthria or aphasia. Following commands.   PERRLA.   EOMI - some L gaze and rotary nystagmus   Visual fields intact   No facial asymmetry.  Tongue midline. Shoulder shrug symmetric.  Motor:  MIK and AG with full strength   Improved dysmetria in RUE  Baseline RUE numbness        Gait deferred    Today I personally reviewed pertinent medications, lines/drains/airways, imaging, cardiology results, laboratory results, microbiology results,    HIT positive 1.15    Laboratory:  CBC:  Recent Labs   Lab 06/21/25  2357   WBC 6.21   RBC 2.80*   HGB 7.7*   HCT 24.8*   PLT 90*   MCV 89   MCH 27.5   MCHC 31.0*     CMP:  Recent Labs   Lab 06/21/25  2357 06/22/25  1340   CALCIUM 7.8*  --    ALBUMIN 3.5 3.4*   PROT 6.3 6.5     --    K 4.2  --    CO2 19*  --      --    BUN 28*  --    CREATININE 1.5*  --    ALKPHOS 64 69   ALT 14 14   AST 13 13   BILITOT 1.2* 1.2*     Recent Labs   Lab 06/21/25  2357   MG 2.3   PHOS 4.5     Coagulation:  Recent Labs   Lab 06/21/25  1156 06/21/25  1355 06/22/25  0812   INR 1.3*  --   --    APTT 31.6   < > 66.9*    < > = values in this interval not displayed.     Lipid Panel:  Recent Labs   Lab 06/21/25  0625   CHOL 111*   HDL 32*   LDLCALC 54.2*   TRIG 124     Endocrine:  Recent Labs     06/21/25  0625   HGBA1C 6.8*   TSH 1.720     ABG:  No results for input(s): "PH", "PCO2", "HCO3", "POCSATURATED", "BE" in the last 72 hours.  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 Heparin gtt transitioned to argatroban gtt. Therapeutic CTH stable   Started Eliquis 10mg BID for 6 more days then 5mg BID    Daily CBC, CMP, mag, phos  SBP < 150  PRN labetalol, hydralazine  Seizure precautions  HOB 30°   Diet NPO  PT/OT/SLP  VTE Prophylaxis: avoid mechanical given bl DVTs to calves, eliquis (therapeutic tx of DVTs)  S/p IVC filter  Stable for step down to nsgy     Numbness and tingling of right upper extremity  See primary problem    Cardiac/Vascular  Hyperlipidemia  History of  Statin  Lipid panel reviewed    Essential hypertension  History of  EKG reviewed, Echo pending   SBP < 150  PRN labetalol, hydralazine  Holding home meds    Hematology  Deep vein thrombosis (DVT) of lower extremity  History of  Was previously on Eliquis for DVT/PE  BLE US at [facility] earlier this month revealed RLE popliteal DVT  CT abdomen pelvis with IV contrast at [facility] showed extension of DVT to the femoral vein with potential clot around the IVC filter  MRI stable, minimal intensity heparin gtt was ordered  Pt is HIT positive.   Hematology consulted and recommending removal of the IVC filter given its prothrombotic risk.   IR was consulted for evaluation  WADE pending  Plt improved to 90    Oncology  Prostate cancer  History of  Diagnosed in 2023, s/p radiation therapy, was  previously on hormonal therapy (recently discontinued once admitted to [facility] earlier this month)  CT chest/abdomen/pelvis with hepatic and renal lesions, negative for other primary malignancy  Generalized muscle weakness due to cancer and hormonal therapy  Follows regularly with Oncology    Endocrine  Diabetes mellitus  History of  Hold home meds  SSI PRN  A1C 6.8%    GI  Gastro-esophageal reflux disease without esophagitis  History of  Restart home meds          The patient is being Prophylaxed for:  Venous Thromboembolism with: Chemical  Stress Ulcer with: Not Applicable   Ventilator Pneumonia with: not applicable    Activity Orders            Diet Consistent Carbohydrate 2000 Calories (up to 75 gm per meal): Carb Control starting at 06/21 1132    Turn patient starting at 06/21 0600    Elevate HOB starting at 06/21 0552          Full Code    Critical care time spent on the evaluation and treatment of severe organ dysfunction, review of pertinent labs and imaging studies, discussions with consulting providers and discussions with patient/family: 35 minutes.    Sebastian Beard PA-C  Neurocritical Care  [facility] Critical Care

## 2025-06-22 NOTE — PROGRESS NOTES
[facility] Neuro Critical Care  Neurosurgery  Progress Note    Subjective:     History of Present Illness: 79 y.o. male with a significant medical history of prostate CA w/brain mets, PE (was on Eliquis until recent ICH), RLE DVT(has IVC filter), HTN, HLD, DM II who presented to the hospital for evaluation of N/V. The patient was seen at [facility] for similar symptoms on 6/9 after having R sided numbness and tingling with dizziness for 2 days prior, and was found to have brainstem ICH and cav mal. He was discharged to [facility] rehab, but returned to ED on 6/20 for nausea/vomiting. He denies new confusion, LOC or focal weakness.     Post-Op Info:  * No surgery found *       Interval History: 06/22/2025: NAEON. AFVSS. Exam stable. CTH yesterday stable.     Medications:  Continuous Infusions:   0.9% NaCl   Intravenous Continuous 75 mL/hr at 06/22/25 0801 Rate Verify at 06/22/25 0801    argatroban in 0.9 % sod chlor  0-10 mcg/kg/min Intravenous Continuous 3.1 mL/hr at 06/22/25 0801 0.5 mcg/kg/min at 06/22/25 0801     Scheduled Meds:   polyethylene glycol  17 g Oral Daily    pravastatin  40 mg Oral Daily    senna-docusate  1 tablet Oral BID    tamsulosin  0.8 mg Oral Daily     PRN Meds:  Current Facility-Administered Medications:     acetaminophen, 650 mg, Oral, Q6H PRN    clonazePAM, 0.5 mg, Oral, Daily PRN    dextrose 50%, 12.5 g, Intravenous, PRN    glucagon (human recombinant), 1 mg, Intramuscular, PRN    hydrALAZINE, 10 mg, Intravenous, Q4H PRN    insulin aspart U-100, 0-5 Units, Subcutaneous, Q6H PRN    labetalol, 10 mg, Intravenous, Q4H PRN    ondansetron, 4 mg, Intravenous, Q4H PRN    sodium chloride 0.9%, 10 mL, Intravenous, PRN     Review of Systems  Objective:     Weight: 104.3 kg (229 lb 15 oz)  Body mass index is 31.19 kg/m².  Vital Signs (Most Recent):  Temp: 98.9 °F (37.2 °C) (06/22/25 0701)  Pulse: (!) 118 (06/22/25 0801)  Resp: (!) 24 (06/22/25 0801)  BP: 116/63 (06/22/25 0801)  SpO2: 100 % (06/22/25 0801)  Vital Signs (24h Range):  Temp:  [98.1 °F (36.7 °C)-98.9 °F (37.2 °C)] 98.9 °F (37.2 °C)  Pulse:  [] 118  Resp:  [16-33] 24  SpO2:  [98 %-100 %] 100 %  BP: (104-133)/(55-85) 116/63                                 Physical Exam         Neurosurgery Physical Exam    Constitutional: drowsy from ativan for MRI     HEENT: atraumatic/normocephalic     Cardiovascular: Regular rhythm.      Pulm: aerating well, saturating well     Abdominal: Soft.      Psych/Behavior: He is alert.      E3V4M6,   Aox3, mild confusion  PERRL  EOMI  Face Symmetric  Tongue midline  FC x4 full strength       Significant Labs:  Recent Labs   Lab 06/20/25 2215 06/21/25 0625 06/21/25  1355 06/21/25  2357    121*  --  116*   * 136  --  139   K 4.7 4.4  --  4.2    105  --  109   CO2 19* 19*  --  19*   BUN 28* 30*  --  28*   CREATININE 1.6* 1.9* 1.5* 1.5*   CALCIUM 8.9 8.9  --  7.8*   MG  --  2.5  --  2.3     Recent Labs   Lab 06/21/25  1156 06/21/25 2048 06/21/25  2357   WBC 6.90 7.12 6.21   HGB 8.2* 8.2* 7.7*   HCT 25.8* 26.0* 24.8*   PLT 80* 82* 90*     Recent Labs   Lab 06/21/25  0147 06/21/25  0625 06/21/25  0625 06/21/25  1156 06/21/25  1355 06/21/25  2357 06/22/25  0253 06/22/25  0515   INR 1.3* 1.3*  --  1.3*  --   --   --   --    APTT  --  35.2*   < > 31.6   < > 101.7* 73.3* 67.8*    < > = values in this interval not displayed.     Microbiology Results (last 7 days)       ** No results found for the last 168 hours. **          All pertinent labs from the last 24 hours have been reviewed.    Significant Diagnostics:  I have reviewed and interpreted all pertinent imaging results/findings within the past 24 hours.  CT Head Without Contrast  Result Date: 6/21/2025  Grossly stable appearance of numerous parenchymal hemorrhages when compared to CT head 06/20/2025.  No significant surrounding edema.  No definite new hemorrhage. No major vascular distribution infarct.  No hydrocephalus. Cerebral volume loss and sequela of  chronic microvascular ischemic disease. Electronically signed by resident: Manuel Espinoza Date:    06/21/2025 Time:    22:21 Electronically signed by: Abel Ba MD Date:    06/21/2025 Time:    22:48    US Upper Extremity Veins Bilateral  Result Date: 6/21/2025  No thrombus in central veins of the right or left upper extremity. Electronically signed by resident: Barb Black Date:    06/21/2025 Time:    16:23 Electronically signed by: Azar Olivares Date:    06/21/2025 Time:    16:48    US Lower Extremity Veins Bilateral  Result Date: 6/21/2025  DVT with extensive clot burden extending from the IVC into the calf veins bilaterally. This report was flagged in Epic as abnormal. This critical information above was discovered at 15:56 and relayed by Barb Black MD  by telephone to Sebastian Beard NP on 06/21/2025 at 16:14. Electronically signed by resident: Barb Black Date:    06/21/2025 Time:    15:55 Electronically signed by: Azar Olivares Date:    06/21/2025 Time:    16:47    Assessment/Plan:     * ICH (intracerebral hemorrhage)  79 y.o. male with a significant medical history of prostate CA w/brain mets, PE (was on Eliquis until recent ICH), RLE DVT(has IVC filter), HTN, HLD, DM II, recently admitted to [facility] 6/9 for brainstem IVH and cav mal, discharged to [facility] rehab, who re presented to the hospital for evaluation of N/V    MRI brain w/wo: 0.8 cm left doral midbrain/rostral hal cav mal with ICH, late subacute blood, likely corresponding with bleed from initial hospitalization on 6/9      - admitted to ICU,   - q1 hour neurochecks  - SBP < 140  - no further imaging at this time  - continue to monitor clinically for symptomatic improvement  - PT/INR/PTT  - eliquis held since last hospitalization 6/9 for initial ICH  - OK for SQH given subacute nature of bleed  - OK for PT/OT OOB   - NSY will sign off. Please page with exam change or questions.     Dispo: per ICU        Roderick Kyle,  MD  Neurosurgery  [facility] Neuro Critical Care

## 2025-06-22 NOTE — ASSESSMENT & PLAN NOTE
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 Heparin gtt transitioned to argatroban gtt. Therapeutic CTH stable   Started Eliquis 10mg BID for 6 more days then 5mg BID    Daily CBC, CMP, mag, phos  SBP < 150  PRN labetalol, hydralazine  Seizure precautions  HOB 30°   Diet NPO  PT/OT/SLP  VTE Prophylaxis: mechanical, eliquis (therapeutic tx of DVTs)  S/p IVC filter  Stable for step down to nsgy

## 2025-06-22 NOTE — PLAN OF CARE
NICU PLAN OF CARE    Dx: ICH (intracerebral hemorrhage)    Goals of Care: SBP < 140    Vital Signs (last 12 hours):   Temp:  [98.2 °F (36.8 °C)-98.5 °F (36.9 °C)]   Pulse:  []   Resp:  [17-33]   BP: (104-133)/(60-85)   SpO2:  [98 %-100 %]      Neuro: AAOx4, Follows commands , and Moves all extremities spontaneously     Cardiac: NSR/ sinus tach    Respiratory: Room Air    Gtts: MIVF and Argatroban     Urine Output: Voids Spontaneously  300 mL/shift    Diet: Diabetic      Labs/Accuchecks: daily labs; q 6 accuchecks     Skin:  No new skin breakdown noted this shift.  Patient turned q2h, bony prominences protected, and mattress inflated/working correctly.     Shift Events:  Head CT done. Q 1 neuro checks performed. PTTs drawn per argatroban nommagram, supratherapeutic for majority of shift. Decreased per nommagram, see MAR.  See flowsheet for further assessment/details.  Family updated on current condition/plan of care, questions answered, and emotional support provided.

## 2025-06-22 NOTE — EICU
VICU Night Rounds Checklist  24H Vital Sign Range:  Temp:  [98 °F (36.7 °C)-99.2 °F (37.3 °C)]   Pulse:  []   Resp:  [16-33]   BP: (105-131)/(53-83)   SpO2:  [96 %-100 %]     Video rounds and LDA reconciliation        Nursing orders placed : IP SILVIO Peripheral IV Access

## 2025-06-22 NOTE — SUBJECTIVE & OBJECTIVE
Interval History: 06/22/2025: BRISEIDA. AFVSS. Exam stable. CTH yesterday stable.     Medications:  Continuous Infusions:   0.9% NaCl   Intravenous Continuous 75 mL/hr at 06/22/25 0801 Rate Verify at 06/22/25 0801    argatroban in 0.9 % sod chlor  0-10 mcg/kg/min Intravenous Continuous 3.1 mL/hr at 06/22/25 0801 0.5 mcg/kg/min at 06/22/25 0801     Scheduled Meds:   polyethylene glycol  17 g Oral Daily    pravastatin  40 mg Oral Daily    senna-docusate  1 tablet Oral BID    tamsulosin  0.8 mg Oral Daily     PRN Meds:  Current Facility-Administered Medications:     acetaminophen, 650 mg, Oral, Q6H PRN    clonazePAM, 0.5 mg, Oral, Daily PRN    dextrose 50%, 12.5 g, Intravenous, PRN    glucagon (human recombinant), 1 mg, Intramuscular, PRN    hydrALAZINE, 10 mg, Intravenous, Q4H PRN    insulin aspart U-100, 0-5 Units, Subcutaneous, Q6H PRN    labetalol, 10 mg, Intravenous, Q4H PRN    ondansetron, 4 mg, Intravenous, Q4H PRN    sodium chloride 0.9%, 10 mL, Intravenous, PRN     Review of Systems  Objective:     Weight: 104.3 kg (229 lb 15 oz)  Body mass index is 31.19 kg/m².  Vital Signs (Most Recent):  Temp: 98.9 °F (37.2 °C) (06/22/25 0701)  Pulse: (!) 118 (06/22/25 0801)  Resp: (!) 24 (06/22/25 0801)  BP: 116/63 (06/22/25 0801)  SpO2: 100 % (06/22/25 0801) Vital Signs (24h Range):  Temp:  [98.1 °F (36.7 °C)-98.9 °F (37.2 °C)] 98.9 °F (37.2 °C)  Pulse:  [] 118  Resp:  [16-33] 24  SpO2:  [98 %-100 %] 100 %  BP: (104-133)/(55-85) 116/63                                 Physical Exam         Neurosurgery Physical Exam    Constitutional: drowsy from ativan for MRI     HEENT: atraumatic/normocephalic     Cardiovascular: Regular rhythm.      Pulm: aerating well, saturating well     Abdominal: Soft.      Psych/Behavior: He is alert.      E3V4M6,   Aox3, mild confusion  PERRL  EOMI  Face Symmetric  Tongue midline  FC x4 full strength       Significant Labs:  Recent Labs   Lab 06/20/25  2215 06/21/25  0625 06/21/25  1355  06/21/25  2357    121*  --  116*   * 136  --  139   K 4.7 4.4  --  4.2    105  --  109   CO2 19* 19*  --  19*   BUN 28* 30*  --  28*   CREATININE 1.6* 1.9* 1.5* 1.5*   CALCIUM 8.9 8.9  --  7.8*   MG  --  2.5  --  2.3     Recent Labs   Lab 06/21/25  1156 06/21/25  2048 06/21/25 2357   WBC 6.90 7.12 6.21   HGB 8.2* 8.2* 7.7*   HCT 25.8* 26.0* 24.8*   PLT 80* 82* 90*     Recent Labs   Lab 06/21/25  0147 06/21/25  0625 06/21/25 0625 06/21/25  1156 06/21/25  1355 06/21/25 2357 06/22/25  0253 06/22/25  0515   INR 1.3* 1.3*  --  1.3*  --   --   --   --    APTT  --  35.2*   < > 31.6   < > 101.7* 73.3* 67.8*    < > = values in this interval not displayed.     Microbiology Results (last 7 days)       ** No results found for the last 168 hours. **          All pertinent labs from the last 24 hours have been reviewed.    Significant Diagnostics:  I have reviewed and interpreted all pertinent imaging results/findings within the past 24 hours.  CT Head Without Contrast  Result Date: 6/21/2025  Grossly stable appearance of numerous parenchymal hemorrhages when compared to CT head 06/20/2025.  No significant surrounding edema.  No definite new hemorrhage. No major vascular distribution infarct.  No hydrocephalus. Cerebral volume loss and sequela of chronic microvascular ischemic disease. Electronically signed by resident: Manuel Espinoza Date:    06/21/2025 Time:    22:21 Electronically signed by: Abel Ba MD Date:    06/21/2025 Time:    22:48    US Upper Extremity Veins Bilateral  Result Date: 6/21/2025  No thrombus in central veins of the right or left upper extremity. Electronically signed by resident: Barb Black Date:    06/21/2025 Time:    16:23 Electronically signed by: Azar Olivares Date:    06/21/2025 Time:    16:48    US Lower Extremity Veins Bilateral  Result Date: 6/21/2025  DVT with extensive clot burden extending from the IVC into the calf veins bilaterally. This report was flagged in Epic  as abnormal. This critical information above was discovered at 15:56 and relayed by Barb Black MD  by telephone to Sebastian Beard NP on 06/21/2025 at 16:14. Electronically signed by resident: Barb Black Date:    06/21/2025 Time:    15:55 Electronically signed by: Azar Olivares Date:    06/21/2025 Time:    16:47

## 2025-06-22 NOTE — PT/OT/SLP EVAL
Occupational Therapy   Evaluation and Treatment    Co-evaluation with PT to have 2 skilled therapists present to safely assess pt's functional mobility.     Name: Franko Smith  MRN: [id number]  Admitting Diagnosis: ICH (intracerebral hemorrhage)  Recent Surgery: * No surgery found *      Recommendations:     Discharge Recommendations: High Intensity Therapy  Discharge Equipment Recommendations:  walker, rolling  Barriers to discharge:  Other (Comment) (increased (A) with ADLs and mobility)    Assessment:     Franko Smith is a 79 y.o. male with a medical diagnosis of ICH (intracerebral hemorrhage).  Pt tolerated session fairly well.  He requires (A) with ADLs and mobility and is performing below his functional baseline.  Due to his current level of function compared to his PLOF and his home environment, high intensity therapy recommended at d/c for maximal pt gains in functional independence and to ensure his safety upon returning home.  He presents with the following.  Performance deficits affecting function: weakness, impaired endurance, impaired self care skills, impaired functional mobility, gait instability, impaired balance, impaired cardiopulmonary response to activity, pain.      Rehab Prognosis: Good; patient would benefit from acute skilled OT services to address these deficits and reach maximum level of function.       Plan:     Patient to be seen 4 x/week to address the above listed problems via self-care/home management, therapeutic activities, therapeutic exercises  Plan of Care Expires: 07/21/25  Plan of Care Reviewed with: patient    Subjective     Chief Complaint: B hip pain while standing  Patient/Family Comments/goals: to get stronger    Occupational Profile:  Living Environment: Pt lives with his wife on the 2nd floor of a 2 story condo with 1 ELIJAH and with elevator access.  He has a walk-in shower and a toilet with grab bars.  Pt reports no recent falls.   Previous level of function: Prior to 2  weeks ago, pt was independent with ADLs and Mod I with mobility, ambulating with a SPC.  Pt was driving.    Roles and Routines: , father, grandfather   Equipment Used at Home: grab bar, shower chair, cane, straight  Assistance upon Discharge: His wife can (A), and he's unsure about additional (A).     Pain/Comfort:  Pain Rating 1: 0/10 (at rest)  Location - Side 1: Bilateral  Location - Orientation 1: generalized  Location 1: hip (while standing)  Pain Addressed 1: Reposition, Distraction, Cessation of Activity  Pain Rating Post-Intervention 1: other (see comments) (not rated)    Patients cultural, spiritual, Jain conflicts given the current situation: no    Objective:     Communicated with: nurse and PT prior to session.  Patient found HOB elevated with telemetry, pulse ox (continuous), blood pressure cuff, peripheral IV upon OT entry to room.    General Precautions: Standard, fall  Orthopedic Precautions: N/A  Braces: N/A  Respiratory Status: Room air    Occupational Performance:    Bed Mobility:    Patient completed Supine to Sit to the R with stand by assistance  Patient completed Sit to Supine with contact guard assistance    Functional Mobility/Transfers:  Patient completed Sit <> Stand Transfer from EOB x 1 trial with contact guard assistance with rolling walker   Pt performed Stand <> Sit t/f to EOB x 1 trial with Min A with RW  Functional Mobility: Pt ambulated ~6 ft x 2 trials with CGA with RW.  He reported dizziness that improved.      Activities of Daily Living:  Grooming: stand by assistance with tray table setup to wash his face and perform oral care while seated EOB  Upper Body Dressing: minimum assistance to don/doff back gown as a robe/manage lines while seated EOB  Lower Body Dressing: total assistance to don non-skid socks while seated EOB.  Pt attempted to perform himself, but he was unable to reach his feet via figure-4 technique.      Cognitive/Visual  Perceptual:  Cognitive/Psychosocial Skills:     -       Oriented to: Person, Place, Time, and Situation   -       Follows Commands/attention:Follows multistep  commands  -       Communication: clear/fluent    Physical Exam:  Upper Extremity Range of Motion:     -       Right Upper Extremity: WFL  -       Left Upper Extremity: WFL  Upper Extremity Strength:    -       Right Upper Extremity: WFL  -       Left Upper Extremity: WFL   Strength:    -       Right Upper Extremity: WFL  -       Left Upper Extremity: WFL  Fine Motor Coordination:    -       Intact    AMPAC 6 Click ADL:  AMPAC Total Score: 16    Treatment & Education:  Pt's HR reached as high as 145 BPM immediately after ambulating.  He was returned to supine.     - Pt required SBA for static sitting balance and Min A for dynamic sitting balance EOB.    Pt edu on role of OT, POC, safety when performing self care tasks, benefit of performing OOB activity, and safety when performing functional transfers and functional mobility.  - White board updated  - Self care tasks completed-- as noted above      Patient left HOB elevated with all lines intact, call button in reach, and bed alarm on    GOALS:   Multidisciplinary Problems       Occupational Therapy Goals          Problem: Occupational Therapy    Goal Priority Disciplines Outcome Interventions   Occupational Therapy Goal     OT, PT/OT Progressing    Description: Goals to be met by: 7/6/2025     Patient will increase functional independence with ADLs by performing:    UE Dressing with Set-up Assistance.  LE Dressing with Minimal Assistance.  Grooming while standing at sink with Stand-by Assistance.  Toileting from toilet with Stand-by Assistance for hygiene and clothing management.   Supine to sit with Modified [place].  Step transfer with Stand-by Assistance with RW.  Toilet transfer to toilet with Stand-by Assistance with RW.                         DME Justifications:   Franko's mobility limitation  cannot be sufficiently resolved by the use of a cane. His functional mobility deficit can be sufficiently resolved with the use of a Rolling Walker. Patient's mobility limitation significantly impairs their ability to participate in one of more activities of daily living.  The use of a RW will significantly improve the patient's ability to participate in MRADLS and the patient will use it on regular basis in the home.    History:     Past Medical History:   Diagnosis Date    Anxiety     Diabetes mellitus, type 2     GERD (gastroesophageal reflux disease)     Hyperlipidemia     Hypertension     Obesity (BMI 30-39.9)          Past Surgical History:   Procedure Laterality Date    HERNIA REPAIR      ULNAR NERVE REPAIR Left        Time Tracking:     OT Date of Treatment: 06/22/25  OT Start Time: 1059  OT Stop Time: 1120  OT Total Time (min): 21 min    Billable Minutes:Evaluation 10 min  Self Care/Home Management 11 min    6/22/2025

## 2025-06-22 NOTE — ASSESSMENT & PLAN NOTE
History of  Was previously on Eliquis for DVT/PE  BLE US at [facility] earlier this month revealed RLE popliteal DVT  CT abdomen pelvis with IV contrast at [facility] ED showed extension of DVT to the femoral vein with potential clot around the IVC filter  MRI stable, minimal intensity heparin gtt was ordered  Pt is HIT positive.   Hematology consulted and recommending removal of the IVC filter given its prothrombotic risk.   IR was consulted for evaluation  WADE pending  Plt improved to 90

## 2025-06-22 NOTE — ASSESSMENT & PLAN NOTE
79 y.o. male with a significant medical history of prostate CA w/brain mets, PE (was on Eliquis until recent ICH), RLE DVT(has IVC filter), HTN, HLD, DM II, recently admitted to [facility] 6/9 for brainstem IVH and cav mal, discharged to [facility] rehab, who re presented to the hospital for evaluation of N/V    MRI brain w/wo: 0.8 cm left doral midbrain/rostral hal cav mal with ICH, late subacute blood, likely corresponding with bleed from initial hospitalization on 6/9      - admitted to ICU,   - q1 hour neurochecks  - SBP < 140  - no further imaging at this time  - continue to monitor clinically for symptomatic improvement  - PT/INR/PTT  - eliquis held since last hospitalization 6/9 for initial ICH  - OK for SQH given subacute nature of bleed  - OK for PT/OT OOB   - NSY will sign off. Please page with exam change or questions.     Dispo: per ICU

## 2025-06-23 PROBLEM — A41.9 SEPSIS: Status: ACTIVE | Noted: 2025-01-01

## 2025-06-23 PROBLEM — R57.9 SHOCK: Status: ACTIVE | Noted: 2025-01-01

## 2025-06-23 PROBLEM — D75.829 HIT (HEPARIN-INDUCED THROMBOCYTOPENIA): Status: ACTIVE | Noted: 2025-01-01

## 2025-06-23 PROBLEM — D65 DIC (DISSEMINATED INTRAVASCULAR COAGULATION): Status: ACTIVE | Noted: 2025-01-01

## 2025-06-23 PROBLEM — J96.01 ACUTE RESPIRATORY FAILURE WITH HYPOXIA: Status: ACTIVE | Noted: 2025-06-23

## 2025-06-23 PROBLEM — I46.9 CARDIAC ARREST: Status: ACTIVE | Noted: 2025-06-23

## 2025-06-23 PROBLEM — Z99.11 ON MECHANICALLY ASSISTED VENTILATION: Status: ACTIVE | Noted: 2025-01-01

## 2025-06-23 NOTE — PLAN OF CARE
[facility] Care Plan  POC reviewed with Franko Smith and family at 1400. Family verbalized understanding. Questions and concerns addressed. No acute events today. Pt progressing toward goals. Will continue to monitor. See below and flowsheets for full assessment and VS info.     -Code blue in IR at 0915  -2 units RBC ordered  -1 unit cryo ordered for uncontrolled bleeding   -Multiple labs sent  -coagulation labs q6h  -Measure neck circumference q2h  -Jack catheter inserted  -levophed started to keep MAP greater than 65  -Fentanyl gtt started  -propofol gtt stopped for blood pressure control  -LR started  -Blood cultures obtained  -sputum culture ordered, will obtain when bleeding risk minimized  -        Is this a stroke patient? no    Neuro:  Vinicio Coma Scale  Best Eye Response: 1-->(E1) none  Best Motor Response: 4-->(M4) withdraws from pain  Best Verbal Response: 1-->(V1) none  Vinicio Coma Scale Score: 6  Assessment Qualifiers: Patient not sedated/intubated, No eye obstruction present  Pupil PERRLA: yes     24 hr Temp:  [97.9 °F (36.6 °C)-98.9 °F (37.2 °C)]     CV:   Rhythm: sinus tachycardia  BP goals:   SBP < 160  MAP > 65    Resp:      Vent Mode: A/C  Set Rate: 16 BPM  Oxygen Concentration (%): 80  Vt Set: 450 mL  PEEP/CPAP: 8 cmH20    Plan: wean to extubate    GI/:     Diet/Nutrition Received: NPO  Last Bowel Movement: 06/20/25  Voiding Characteristics: voids spontaneously without difficulty    Intake/Output Summary (Last 24 hours) at 6/23/2025 1842  Last data filed at 6/23/2025 1818  Gross per 24 hour   Intake 2109.07 ml   Output 275 ml   Net 1834.07 ml     Unmeasured Output  Unmeasured Urine Occurrence: 2    Labs/Accuchecks:  Recent Labs   Lab 06/23/25  1030   WBC 9.26   RBC 2.29*   HGB 6.2*   HCT 20.8*   PLT 97*      Recent Labs   Lab 06/23/25  1030      K 4.3   CO2 16*      BUN 27*   CREATININE 1.7*   ALKPHOS 63   ALT 89*   AST 90*   BILITOT 1.2*      Recent Labs   Lab 06/23/25  1240    PROTIME 54.9*   INR 5.7*   APTT >150.0*      Recent Labs   Lab 06/19/25  0642          Electrolytes: N/A - electrolytes WDL  Accuchecks: Q6H    Gtts:   argatroban in 0.9 % sod chlor  0-10 mcg/kg/min Intravenous Continuous   Stopped at 06/23/25 1231    fentanyl  0-500 mcg/hr Intravenous Continuous 7.5 mL/hr at 06/23/25 1811 75 mcg/hr at 06/23/25 1811    lactated ringers   Intravenous Continuous 75 mL/hr at 06/23/25 1811 Rate Verify at 06/23/25 1811    NORepinephrine 4 mg in D5W 250 mL infusion   Intravenous Code/Trauma/sedation Continuous Med 78.2 mL/hr at 06/23/25 0955 0.2 mcg/kg/min at 06/23/25 0955    NORepinephrine bitartrate-D5W  0-0.2 mcg/kg/min Intravenous Continuous 55.1 mL/hr at 06/23/25 1811 0.14 mcg/kg/min at 06/23/25 1811    propofoL  0-50 mcg/kg/min Intravenous Continuous   Stopped at 06/23/25 1215       LDA/Wounds:    Johnathan Risk Assessment  Sensory Perception: 4-->no impairment  Moisture: 4-->rarely moist  Activity: 1-->bedfast  Mobility: 3-->slightly limited  Nutrition: 3-->adequate  Friction and Shear: 3-->no apparent problem  Johnathan Score: 18    Is your johnathan score 12 or less? no            Restraints:        [facility]

## 2025-06-23 NOTE — ASSESSMENT & PLAN NOTE
S/p PEA arrest on 6/23 during IR procedure for clot removal and IVC filter retrieval  ROSC achieved after ~12-15 minutes, s/p epi x4, IV tPA 50 mg x1  Etiology presumed PE     - Not a candidate for TTM given hypercoaguable state, strict normothermia  - Head CT when able   - MRI brain w/o contrast to assess for anoxic brain injury x72 hrs (6/26)  - Trend lactate, trop, CBC, CMP, coags q6hrs  - Place nguyen, pt at high risk for ATN    Family updated extensively at bedside. Prognosis guarded.

## 2025-06-23 NOTE — PLAN OF CARE
06/23/25 1541   Rounds   Attendance Provider;;Physical therapist   Discharge Plan A   (tbd)   Why the patient remains in the hospital Requires continued medical care   Transition of Care Barriers None     Leslie Shine MSW, LCSW  [facility]  Case Management Dept.

## 2025-06-23 NOTE — PROCEDURES
Radiology Post-Procedure Note    Pre Op Diagnosis: IVC thrombosis, DVT, IVC filter    Post Op Diagnosis: Same    Procedure: Thrombectomy, IVC filter retrieval    Procedure performed by: Venu Amezquita MD    Written Informed Consent Obtained: Yes  Specimen Removed: YES IVC Filter  Estimated Blood Loss: Minimal    Findings:   Via rt IJ access, A 26 Fr protrieve sheath was advanced to upper IVC and filter basket deployed. Venogram demonstrates thrombus above the IVC filter.     A 16 Fr sheath was then advanced to the top of the filter. Retrieval of IVC filter with gooseneck snare was performed. Then a XXX device was advanced to the level of the left CFV and basket deployed. At this time clot retrieval was initiated.     As the clot was being retrieved, the patient's oxygen saturation decreased. At this time, decision made to call a code blue for further management. Please refer to code documentation for details.     There was ROSC prior to pt being transferred back to ICU. Sheath was removed and hemostasis achieved with manual compression.    See dictation.    Venu Amezquita M.D.  Interventional Radiology  Department of Radiology

## 2025-06-23 NOTE — PROGRESS NOTES
Hematology Oncology Progress Note          SUBJECTIVE:      History of Present Illness:  Patient is a 79 y.o. male with a PMH of prostate CA (diagnosed 2023 s/p radiation), PE (diagnosed in Feb of 2024, was previously on Eliquis until recent IPH), RLE DVT transferred from rehab and admitted to [facility] for nausea, vomiting and new bilateral LE DVTs extending to IVC filter in setting of above recent brain hemorrhages. CT head showed grossly stable appearance of numerous parenchymal hemorrhages.      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 He is chronically on Eliquis for prior DVTs which was reversed during admission at [facility], He was discharge to rehab 06/13 and has been receiving SubQ Heparin for chemical dvt ppx during that stay and when at inpatient rehab. He had a notable drop in platelet count from 148k (June 13) to 72k (June 21) leading to HIT panel being ordered at their facility. 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 CT abdomen/pelvis however with substantial filling defects above and around IVC filter consistent with thrombus which extend inferiorly to limbs all the way to calves consistent with DVTs (confirmed on ultrasound). 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 Patient has been started on argatroban and Hematology consulted for suspected HIT.          Review of patient's allergies indicates:   Allergen Reactions    Ether for anesthesia Nausea And Vomiting           Past Medical History:   Diagnosis Date    Anxiety      Diabetes mellitus, type 2      GERD (gastroesophageal reflux disease)      Hyperlipidemia      Hypertension      Obesity (BMI 30-39.9)              Past Surgical  History:   Procedure Laterality Date    HERNIA REPAIR        ULNAR NERVE REPAIR Left               Family History   Problem Relation Name Age of Onset    Hypertension Mother        Hyperlipidemia Mother        Cancer Father        Diabetes Sister        Diabetes Brother        Stroke Neg Hx        Heart disease Neg Hx          [Social History]    [Social History]        Tobacco Use    Smoking status: Never       Passive exposure: Never    Smokeless tobacco: Never   Substance Use Topics    Alcohol use: Yes       Alcohol/week: 0.0 standard drinks of alcohol       Comment: social    Drug use: No     Review of Systems   Constitutional:  Positive for malaise/fatigue.   Gastrointestinal:  Positive for abdominal pain, nausea and vomiting.   Neurological:  Positive for headaches.      OBJECTIVE:      Vital Signs:  Temp:  [98.1 °F (36.7 °C)-98.9 °F (37.2 °C)]   Pulse:  []   Resp:  [15-38]   BP: (104-133)/(60-85)   SpO2:  [98 %-100 %]      Physical Exam  Constitutional:       General: He is not in acute distress. He is intubated     Appearance: He is ill-appearing.   Cardiovascular:      Rate and Rhythm: Tachycardia present.      Heart sounds: Normal heart sounds.   Pulmonary:      Effort: Pulmonary effort is normal.      Breath sounds: Normal breath sounds.   Abdominal:      General: Bowel sounds are normal.      Palpations: Abdomen is soft.   Musculoskeletal:      Right lower leg: No edema.      Left lower leg: No edema.   Neurological:      Mental Status: He is sedated.        Laboratory:  I have reviewed all pertinent lab results within the past 24 hours.        Diagnostic Results:  Labs: Reviewed        ASSESSMENT/PLAN:      HIT  [facility]  Bilateral DVT  Massive PE resulting in cardiac arrest     An 79-year-old male with a past medical history of prostate cancer (diagnosed in 2023, treated with radiation), PE (diagnosed February 2024, previously on Eliquis until recent ICH related to cavernomas), and chronic RLE DVT,  was transferred from a rehabilitation facility and admitted to the [facility] with nausea, vomiting, and newly diagnosed bilateral lower extremity DVTs extending from the IVC filter into the calf veins bilaterally in the context of recent brain hemorrhages.    CT head revealed a grossly stable appearance of multiple parenchymal hemorrhages. Brain MRI 06/21 shows early subacute parenchymal hemorrhages in the left thalamus and left cerebellar hemisphere and numerous chronic hemorrhages and micro hemorrhages throughout bilateral cerebral and cerebellar hemispheres       He was recently hospitalized at [facility] from 06/09 to 06/13 for multifocal intracerebral hemorrhages, suspected to be secondary to cavernomas. During that admission, his chronic anticoagulation with Eliquis for prior DVTs was reversed. He was discharged to rehab. 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 The anti-heparin antibody panel returned moderately strongly positive (1.15); WADE is pending.     He has been started on argatroban, Platelet counts is improving (72 ? 80 ? 82 ? 90). The etiology of the new DVTs--whether due to HIT or related to the IVC filter is unclear.     He underwent an IVC filter removal on 6/23 and during clot retrieval he became hypoxic and pulseless. A code blue was called (see documentation). TPA (IV 50mg push x1) was given over a presumed massive PE with improvement in oxygenation and ROSC. He later arrived to [facility] where he was found to be bleeding/oozing from IR transducer site and PIVs. Coags notable for fibrinogen<70, INR 5.7, aPTT>150 (reflective of TPA) and his bleeding was controlled with direct pressure.     Recommendations:   -continue holding argatroban due to risk for bleeding  -agree that while he is at a high risk for bleeding he is also at a huge risk for thrombosis and that correction of his coagulopathy could further  worsen his thrombotic picture  -for now, monitor CBC and Coags (PT/INR, PTT, Fibrinogen) closely. When coagulopathy resolved, would consider restarting argatroban assuming no prohibitive bleeding  -will follow up HIT/WADE results  -appreciate NCC care    Discussed with Dr. Salvador       We will continue to follow

## 2025-06-23 NOTE — ASSESSMENT & PLAN NOTE
"This patient does not have evidence of infective focus. RLL aspiration/consolidation without clear evidence of infection  My overall impression is shock due to hemorrhage and vasoplegia, lower concern for septic shock at this time.  Source: See above, ? RLL aspiration PNA, micro NGTD  Antibiotics given-   Antibiotics (72h ago, onward)      Start     Stop Route Frequency Ordered    06/23/25 1715  piperacillin-tazobactam (ZOSYN) 4.5 g in D5W 100 mL IVPB (MB+)         -- IV Every 8 hours (non-standard times) 06/23/25 1605    06/23/25 1715  vancomycin 1,500 mg in 0.9% NaCl 250 mL IVPB (admixture device)         -- IV Once 06/23/25 1614    06/23/25 1704  vancomycin - pharmacy to dose  (vancomycin IVPB (PEDS and ADULTS))        Placed in "And" Linked Group    -- IV pharmacy to manage frequency 06/23/25 1605    06/22/25 1215  mupirocin 2 % ointment         06/27/25 0859 Nasl 2 times daily 06/22/25 1107          Latest lactate reviewed-  Recent Labs   Lab 06/23/25  1030   LACTATE 3.8*     Organ dysfunction indicated by Acute kidney injury and Acute respiratory failure    Fluid challenge Fluid Not Needed - Patient is not hypotensive and/or lactate is less than 4.0.   Lactate<4, receiving blood in place of fluids    Post- resuscitation assessment No - Post resuscitation assessment not needed       Will Start Pressors- Levophed for MAP of 65  Source control achieved by: vanc/zosyn to cover for possible aspiration event -d/c if micro remains negative x24 hrs   "

## 2025-06-23 NOTE — EICU
JASON Night Rounds Checklist  24H Vital Sign Range:  Temp:  [98.4 °F (36.9 °C)-98.9 °F (37.2 °C)]   Pulse:  []   Resp:  [15-38]   BP: ()/(60-85)   SpO2:  [98 %-100 %]     Video rounds

## 2025-06-23 NOTE — RESPIRATORY THERAPY
RAPID RESPONSE RESPIRATORY       Code Blue paged and responded to.  Upon arrival Pt code being run by IR team.   Please call [phone] for further concerns or assistance.

## 2025-06-23 NOTE — CARE UPDATE
Pt arrived from IR intubated. Pt has a 7.5 tube secured and patent at the 24 cm tyrel. Placed pt on ventilator at documented settings. Will continue to monitor.

## 2025-06-23 NOTE — ASSESSMENT & PLAN NOTE
Patient with Hypoxic Respiratory failure which is Acute.  he is not on home oxygen. Supplemental oxygen was provided and noted- Vent Mode: A/C  Oxygen Concentration (%):  [] 80  Resp Rate Total:  [21 br/min-69 br/min] 25 br/min  Vt Set:  [450 mL] 450 mL  PEEP/CPAP:  [10 cmH20] 10 cmH20  Mean Airway Pressure:  [14 avE96-05 cmH20] 14 cmH20    .   Signs/symptoms of respiratory failure include- cyanosis and cardiac arrest 2/2 hypoxemia. Contributing diagnoses includes - Pulmonary Embolus Labs and images were reviewed. Patient Has recent ABG, which has been reviewed. Will treat underlying causes and adjust management of respiratory failure as follows- s/p IV tPA. Holding argatroban gtt pending stabilization of acute DIC labs.

## 2025-06-23 NOTE — ASSESSMENT & PLAN NOTE
Multifocal IPH on 6/9 at OSH, likely in setting of therapeutic AC (on Eliquis) and multifocal cavernomas. 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 Started on AC w/ monitoring in NCC given recent IPH  IPH stable on repeat imaging, NSGY following, no surgical intervention at this time  Pt w/ PEA arrest on 6/23 2/2 massive PE, s/p IV tPA, now in acute DIC -> stat repeat head CT when stable for transport  Admit to [facility]  Q1h neuro checks, vitals, I/Os  Was previously on Eliquis for DVT/PE  Reversed earlier this month at [facility] and has not been restarted since  Pt is HIT positive. Heparin gtt transitioned to argatroban gtt. Therapeutic CTH stable   Diet NPO  PT/OT/SLP -> on hold due to critical illness  VTE Prophylaxis: mechanical, argatroban gtt on hold due to acute DIC  S/p IVC filter placement and subsequent removal on 6/23  Restart argatroban once PTT<80, INR<2 and fibrinogen>150

## 2025-06-23 NOTE — PLAN OF CARE
Problem: Adult Inpatient Plan of Care  Goal: Plan of Care Review  Outcome: Progressing  Goal: Patient-Specific Goal (Individualized)  Outcome: Progressing  Goal: Absence of Hospital-Acquired Illness or Injury  Outcome: Progressing  Goal: Optimal Comfort and Wellbeing  Outcome: Progressing  Goal: Readiness for Transition of Care  Outcome: Progressing     Problem: Stroke, Intracerebral Hemorrhage  Goal: Optimal Coping  Outcome: Progressing  Goal: Effective Bowel Elimination  Outcome: Progressing  Goal: Optimal Cerebral Tissue Perfusion  Outcome: Progressing  Goal: Optimal Cognitive Function  Outcome: Progressing  Goal: Effective Communication Skills  Outcome: Progressing  Goal: Optimal Functional Ability  Outcome: Progressing  Goal: Optimal Nutrition Intake  Outcome: Progressing  Goal: Optimal Pain Control and Function  Outcome: Progressing  Goal: Effective Oxygenation and Ventilation  Outcome: Progressing  Goal: Improved Sensorimotor Function  Outcome: Progressing  Goal: Safe and Effective Swallow  Outcome: Progressing  Goal: Effective Urinary Elimination  Outcome: Progressing     Problem: Diabetes Comorbidity  Goal: Blood Glucose Level Within Targeted Range  Outcome: Progressing     Problem: Fall Injury Risk  Goal: Absence of Fall and Fall-Related Injury  Outcome: Progressing     Problem: Skin Injury Risk Increased  Goal: Skin Health and Integrity  Outcome: Progressing     Problem: Wound  Goal: Optimal Coping  Outcome: Progressing  Goal: Optimal Functional Ability  Outcome: Progressing  Goal: Absence of Infection Signs and Symptoms  Outcome: Progressing  Goal: Improved Oral Intake  Outcome: Progressing  Goal: Optimal Pain Control and Function  Outcome: Progressing  Goal: Skin Health and Integrity  Outcome: Progressing  Goal: Optimal Wound Healing  Outcome: Progressing     Problem: Infection  Goal: Absence of Infection Signs and Symptoms  Outcome: Progressing     Problem: Sepsis/Septic Shock  Goal: Optimal  Coping  Outcome: Progressing  Goal: Absence of Bleeding  Outcome: Progressing  Goal: Blood Glucose Level Within Targeted Range  Outcome: Progressing  Goal: Absence of Infection Signs and Symptoms  Outcome: Progressing  Goal: Optimal Nutrition Intake  Outcome: Progressing

## 2025-06-23 NOTE — SUBJECTIVE & OBJECTIVE
Interval History:  Please see hospital course above for full details    Review of Systems   Unable to perform ROS: Intubated       Objective:     Vitals:  Temp: 97.9 °F (36.6 °C)  Pulse: 102  Rhythm: sinus tachycardia  BP: 116/66  MAP (mmHg): 84  Resp: (!) 26  ETCO2 (mmHg): 29 mmHg  SpO2: 96 %  Oxygen Concentration (%): 80  Vent Mode: A/C  Set Rate: 18 BPM  Vt Set: 450 mL  PEEP/CPAP: 10 cmH20  Peak Airway Pressure: 26 cmH20  Mean Airway Pressure: 14 cmH20  Plateau Pressure: 0 cmH20    Temp  Min: 97.9 °F (36.6 °C)  Max: 98.9 °F (37.2 °C)  Pulse  Min: 95  Max: 131  BP  Min: 97/67  Max: 142/85  MAP (mmHg)  Min: 69  Max: 107  Resp  Min: 15  Max: 33  ETCO2 (mmHg)  Min: 26 mmHg  Max: 31 mmHg  SpO2  Min: 96 %  Max: 100 %  Oxygen Concentration (%)  Min: 80  Max: 100    06/22 0701 - 06/23 0700  In: 456.2 [I.V.:456.2]  Out: 200 [Urine:200]   Unmeasured Output  Unmeasured Urine Occurrence: 2        Physical Exam  General Appearance: Elderly gentleman, intubated, sedated  Mental Status Exam: No response to verbal or noxious stimuli. Not tracking/regarding, not following commands  Cranial Nerves: Gaze midline, pupils 3 and minimally reactive b/l. +OCRs, +corneals to saline b/l. Gag/cough deferred given bleeding risk  Motor: Flicker to noxious x4 extremities, no clear asymmetry   Sensory: No clear response to noxious x4 extremities  Coordination: Unable to assess  Vascular: S1/S2 of normal intensity, no S3/S4 appreciated, no murmurs appreciated  Lungs: Coarse breath sounds b/l  Abdomen: Soft, non-distended, non-tender, hypoactive BS +         Medications:  Continuousargatroban in 0.9 % sod chlor, Last Rate: Stopped (06/23/25 1237)  fentanyl, Last Rate: 50 mcg/hr (06/23/25 1218)  lactated ringers, Last Rate: 75 mL/hr at 06/23/25 1148  NORepinephrine 4 mg in D5W 250 mL infusion, Last Rate: 0.2 mcg/kg/min (06/23/25 0955)  NORepinephrine bitartrate-D5W, Last Rate: 0.22 mcg/kg/min (06/23/25 1227)  propofoL, Last Rate: 20 mcg/kg/min  (06/23/25 1036)    Scheduledfamotidine (PF), 20 mg, Daily  mupirocin, , BID  polyethylene glycol, 17 g, Daily  pravastatin, 40 mg, Daily  senna-docusate, 1 tablet, BID  [START ON 6/24/2025] silodosin, 4 mg, Daily    PRN0.9%  NaCl infusion (for blood administration), , Q24H PRN  0.9%  NaCl infusion (for blood administration), , Q24H PRN  acetaminophen, 650 mg, Q6H PRN  alteplase, 50 mg, PRN  clonazePAM, 0.5 mg, Daily PRN  dextrose 50%, 12.5 g, PRN  EPINEPHrine, , Code/trauma/sedation Med  EPINEPHrine, , Code/trauma/sedation Med  EPINEPHrine, , Code/trauma/sedation Med  EPINEPHrine, , Code/trauma/sedation Med  fentanyl, 25 mcg, Q2H PRN  glucagon (human recombinant), 1 mg, PRN  hydrALAZINE, 10 mg, Q4H PRN  insulin aspart U-100, 0-5 Units, Q6H PRN  labetalol, 10 mg, Q4H PRN  NORepinephrine 4 mg in D5W 250 mL infusion, , Code/Trauma/sedation Continuous Med  ondansetron, 4 mg, Q4H PRN  promethazine (PHENERGAN) 12.5 mg in 0.9% NaCl 50 mL IVPB, 12.5 mg, Q6H PRN  sodium chloride 0.9%, 10 mL, PRN      Today I personally reviewed pertinent imaging, cardiology results, laboratory results, notably: Pre-IR labs overnight w/ stable anemia/thrombocytopenia, otherwise unremarkable. Post-code labs notable for lactate 3.8 -> 3.9, ABG 7.009/66.7/140/-14 immediately post code, improved to 7.293/37.1/380 on recheck. CBC w/ Hb 6.2, platelets stably low at 97. PT 54.9, INR 5.7, aPTT>150, fibrinogen<70. CMP w/ uptrending creatinine 1.5 -> 1.7, mild transaminitis 90/89. BNP<10, trop 73. CXR w/ ET tube in correct position. TTE pending    Diet  Diet NPO  Diet NPO  NPO, no enteral access, hold on placing given critically ill state and markedly abnormal coags

## 2025-06-23 NOTE — SIGNIFICANT EVENT
Notified by team that patient coding in IR, taken down to IR for removal of IVC filter and surrounding clot per heme recommendations. On arrival to room, patient intubated, O2 sats in high 20-low 30s. PEA on rhythm check.     Patient given epi x4 w/o improvement in oxygenation or ROSC. Decision made to give tPA given suspected PE as etiology of code, acknowledging that patient at risk for intracranial hemorrhage given known cavernomas w/ recent IPH on 6/9. Given tPA 50 mg IV push w/ improvement in oxygenation and ROSC. Second dose of tPA (to complete 100 mg load) held given pt risk of intracranial bleed. Pt transported back to [facility] w/ anesthesia, nursing, respiratory therapy and this MD. Initial lactate 3.9, pH 7.009. Trop, BNP, CMP, CBC, fibrinogen, coags, lactate sent on arrival to [facility], CBC w/ Hb 6.2, 2 u PRBCs ordered. Initially continued on argatroban gtt per protocol however pt w/ persistent oozing from site of IR transducer and oozing from PIV sites, argatroban held pending coags resulting -> coags notable for fibrinogen<70, INR 5.7, aPTT>150. Considered administering cryo, however given pt's hypercoaguable state, concern for pt w/ recurrent thrombosis w/ products. Venous bleeding controlled w/ direct pressure, will hold on reversing blood thinners unless pt w/ additional hemorrhage. Monitoring labs q6hrs, restart argatroben gtt once PTT<80, fibrinogen>150. Strict normothermia, holding TTM given hypercoaguability.     Pt hypoxic and pulseless for ~15 min while receiving CPR, per discussion w/ responding pulm critical care team may have been pulseless for 2-3 minutes prior to initiation of CPR. Did discuss w/ wife and brother-in-law (family medicine physician) that at this time unclear prognosis from code, however patient at risk of both intracranial hemorrhage and anoxic brain injury (notably given prolonged hypoxia during code). Will attempt to obtain CT head later today to evaluate for hemorrhage, however did  discuss that may be 48-72 hrs before able to comment on possibility of anoxic injury.     Additional Uninterrupted Critical Care/Counseling Time (not including procedures): 60 minutes  There is high probability for acute neurological change leading to clinical and possibly life-threatening deterioration requiring highest level of physician preparedness for urgent intervention. Critical care time was spent personally by me on the following activities: development of treatment plan with patient or surrogate and bedside caregivers, discussions with consultants, evaluation of patient's response to treatment, examination of patient, ordering and performing treatments and interventions, ordering and review of laboratory studies, ordering and review of radiographic studies, pulse oximetry, antibiotic titration if applicable, vasopressor titration if applicable, re-evaluation of patient's condition. I spent greater than 50% of the documented critical care time assessing and making medical decisions for this patient.     Anne Mabry MD, MPH  Neurocritical Care Physician

## 2025-06-23 NOTE — SEDATION DOCUMENTATION
Pulse decreased 45, O2 sats decreased 50%. MD notified and O2 increased to 10 liters via bag mask and rapid/Code called. 0937 compression begun and CRNA at bedside to intubate. MD at bedside entire time. See code record

## 2025-06-23 NOTE — CODE/ RAPID DOCUMENTATION
Code blue called overhead. On arrival, SpO2 40s and patient in PEA. Anesthesia placing airway. ACLS initiated. Patient underwent multiple rounds of CPR and Epi. After TPA administration, ROSC obtained and patient transferred to Neuro ICU.    Critical Care Time: 40 minutes     Critical secondary to Patient has a condition that poses threat to life and bodily function: 40.     Critical care was time spent personally by me on the following activities: development of treatment plan with patient or surrogate and bedside caregivers, discussions with consultants, evaluation of patient's response to treatment, examination of patient, ordering and performing treatments and interventions, ordering and review of laboratory studies, ordering and review of radiographic studies, pulse oximetry, re-evaluation of patient's condition. This critical care time did not overlap with that of any other provider or involve time for any procedures.     Magui Bullock, [facility]  Pulmonary Critical Care  [id number]

## 2025-06-23 NOTE — ASSESSMENT & PLAN NOTE
Hypotensive following PEA arrest on 6/23    - Suspect multifactorial shock, hemorrhagic +/- component of vasoplegia 2/2 cardiac arrest, may have component of cardiogenic 2/2 suspected massive PE  - Hb 6.2 s/p cardiac arrest, 2 u PRBCs  - LR @ 75 cc/hr  - TTE, ECG, trop pending  - Trend lactate q6hrs   - Wean levophed gtt as tolerated for goal MAP>65, SBP<160     No evidence of infection prior to procedure, low clinical suspicion for septic shock at this time

## 2025-06-23 NOTE — ASSESSMENT & PLAN NOTE
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

## 2025-06-23 NOTE — ASSESSMENT & PLAN NOTE
Lab values s/p cardiac arrest consistent w/ acute DIC, likely provoked in setting of HIT and cardiac arrest    - s/p IV tPA 50 mg x1 during arrest, given at 0952  - Hold argatroban gtt  - Monitor PT/INR, PTT, fibrinogen, CBC q6hrs    - Restart argatroban gtt once PTT<80, INR<2, fibrinogen>150   - Transfuse PRBCs for Hb<7, platelet goal >20k    Hold on administering cryo/FFP given high risk of recurrent thrombosis unless pt w/ worsening bleeding and/or persistent transfusion requirement

## 2025-06-23 NOTE — PROGRESS NOTES
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    Please contact pharmacy at extension [phone] with any questions regarding this assessment.     Thank you for the consult,   Maria R Bender, PharmD, [facility]  Neurocritical Care Pharmacist  [id number]         Patient brief summary:  Franko Smith is a 79 y.o. male initiated on antimicrobial therapy with IV Vancomycin for treatment of suspected pneumonia    Drug Allergies:   Review of patient's allergies indicates:   Allergen Reactions    Heparin analogues      HIT    Ether for anesthesia Nausea And Vomiting       Actual Body Weight:   104.3 kg    Renal Function:   Estimated Creatinine Clearance: 43.3 mL/min (A) (based on SCr of 1.7 mg/dL (H)).,     Dialysis Method (if applicable):  N/A    CBC (last 72 hours):  Recent Labs   Lab Result Units 06/20/25  2215 06/21/25  0625 06/21/25  1156 06/21/25  2048 06/21/25  2357 06/23/25  0001 06/23/25  1030   WBC K/uL 6.92 6.30 6.90 7.12 6.21 7.26 9.26   HGB gm/dL 9.0* 8.6* 8.2* 8.2* 7.7* 7.7* 6.2*   Hemoglobin A1c %  --  6.8*  --   --   --   --   --    HCT % 28.3* 27.0* 25.8* 26.0* 24.8* 24.5* 20.8*   Platelet Count K/uL 128* 72* 80* 82* 90* 114* 97*   Lymph % % 15.0* 12.1* 13.9* 13.5* 11.0* 14.7* 24.3   Mono % % 12.3 10.6 13.0 12.2 11.6 12.8 5.8   Eos % % 1.9 1.3 1.7 2.0 2.3 1.7 1.0   Basophil % % 0.4 0.5 0.3 0.4 0.2 0.4 0.2       Metabolic Panel (last 72 hours):  Recent Labs   Lab Result Units 06/20/25  2215 06/21/25  0224 06/21/25  0625 06/21/25  1355 06/21/25  2357 06/22/25  1340 06/22/25  1636 06/23/25  0001 06/23/25  1030   Sodium mmol/L 135*  --  136  --  139  --   --  137 138   Potassium mmol/L 4.7  --  4.4  --  4.2  --   --   "4.7 4.3   Chloride mmol/L 104  --  105  --  109  --   --  109 109   CO2 mmol/L 19*  --  19*  --  19*  --   --  17* 16*   Glucose mg/dL 108  --  121*  --  116*  --   --  113* 202*   Glucose, UA   --  Negative  --   --   --   --   --   --   --    BUN mg/dL 28*  --  30*  --  28*  --   --  26* 27*   Creatinine mg/dL 1.6*  --  1.9* 1.5* 1.5*  --  1.5* 1.5* 1.7*   Albumin g/dL 4.0  --  3.9 3.5 3.5 3.4* 3.4* 3.3* 2.4*   Bilirubin Total mg/dL 1.4*  --  1.5* 1.3* 1.2* 1.2* 1.1* 1.0 1.2*   ALP unit/L 77  --  74 69 64 69 70 68 63   AST unit/L 15  --  12 10* 13 13 12 12 90*   ALT unit/L 16  --  15 13 14 14 14 12 89*   Magnesium  mg/dL  --   --  2.5  --  2.3  --   --  2.5  --    Phosphorus Level mg/dL  --   --  4.9*  --  4.5  --   --  4.4  --        Drug levels (last 3 results):  No results for input(s): "VANCOMYCINRA", "VANCORANDOM", "VANCOMYCINPE", "VANCOPEAK", "VANCOMYCINTR", "VANCOTROUGH" in the last 72 hours.    Microbiologic Results:  Microbiology Results (last 7 days)       Procedure Component Value Units Date/Time    MRSA Screen by PCR [[id number]]     Order Status: Sent Specimen: Nasal Swab     Culture, Respiratory with Gram Stain [[id number]]     Order Status: Sent Specimen: Respiratory             "

## 2025-06-23 NOTE — ASSESSMENT & PLAN NOTE
Patient with Hypoxic Respiratory failure which is Acute.  he is not on home oxygen. Supplemental oxygen was provided and noted- Vent Mode: A/C  Oxygen Concentration (%):  [] 80  Resp Rate Total:  [21 br/min-69 br/min] 31 br/min  Vt Set:  [450 mL] 450 mL  PEEP/CPAP:  [8 cmH20-10 cmH20] 8 cmH20  Mean Airway Pressure:  [6.6 peW33-86 cmH20] 6.6 cmH20    .   Signs/symptoms of respiratory failure include- cyanosis and cardiac arrest 2/2 hypoxemia. Contributing diagnoses includes - Pulmonary Embolus Labs and images were reviewed. Patient Has recent ABG, which has been reviewed. Will treat underlying causes and adjust management of respiratory failure as follows- s/p IV tPA. Holding argatroban gtt pending stabilization of acute DIC labs.     - RLL lobe collapse on CT chest, concern for aspiration PNA - > on vanc/zosyn, d/c if micro remains negative for additional 24 hrs    - Hold chest PT given abnormal coags, risk of worsening retrosternal hemorrhage   - Now on minimal vent settings

## 2025-06-23 NOTE — CODE/ RAPID DOCUMENTATION
RAPID RESPONSE NOTE     Code Blue called overhead. RRT reported to IR suite. Upon arrival, Dr. Landis at head of bed establishing airway. Dr. Amezquita holding sheath in place. Pt hypoxic and pulseless. Code run by Dr. Mabry. RRT assisted in providing high quality CPR.     Please call Rapid Response RN, Erma Dietrich, LILIA with any questions or concerns at [phone].

## 2025-06-23 NOTE — ASSESSMENT & PLAN NOTE
History of  Was previously on Eliquis for DVT/PE  BLE US at [facility] earlier this month revealed RLE popliteal DVT  CT abdomen pelvis with IV contrast [place] [facility] showed extension of DVT to the femoral vein with potential clot around the IVC filter  Pt is HIT positive.   Hematology consulted and recommending removal of the IVC filter given its prothrombotic risk -> IVC filter removed 6/23, unable to fully remove clot prior to patient coding  WADE pending  S/p IV tPA on 6/23 during cardiac arrest, argatroban gtt currently on hold due to profoundly abnormal coags  Restart argatroban gtt once aPTT<80, INR<2, fibrinogen>150

## 2025-06-23 NOTE — H&P
Radiology History & Physical      SUBJECTIVE:     Chief Complaint: IVC filter in place with extensive thrombus    History of Present Illness:  Franko Smith is a 79 y.o. male w/ a hx of prostate cancer c/b PE s/p IVC filter placement. Patient developed extensive clot burden on the IVC extending into the femoral veins bilaterally. Patient referred to IR for thrombectomy of IVC filter.     Past Medical History:   Diagnosis Date    Anxiety     Diabetes mellitus, type 2     GERD (gastroesophageal reflux disease)     Hyperlipidemia     Hypertension     Obesity (BMI 30-39.9)      Past Surgical History:   Procedure Laterality Date    HERNIA REPAIR      ULNAR NERVE REPAIR Left        Home Meds:   Prior to Admission medications    Medication Sig Start Date End Date Taking? Authorizing Provider   acetaminophen (TYLENOL) 500 MG tablet Take 500 mg by mouth every 6 (six) hours as needed for Pain.    Provider, Historical   amLODIPine (NORVASC) 10 MG tablet Take 0.5 tablets by mouth once daily. 12/27/23   Provider, Historical   apixaban (ELIQUIS) 5 mg Tab Take 5 mg by mouth 2 (two) times daily.    Provider, Historical   clonazePAM (KLONOPIN) 0.5 MG tablet Take 1 tablet by mouth daily as needed. 2/11/25   Provider, Historical   diclofenac sodium (VOLTAREN) 1 % Gel Apply topically. 1/3/25   Provider, Historical   efinaconazole (JUBLIA) 10 % Karla Apply topically. 10/11/24   Provider, Historical   Hydrocortisone 1%-Econazole 1% Topical Cream Apply topically. 2/12/25   Provider, Historical   levocetirizine (XYZAL) 5 MG tablet Take 1 tablet (5 mg total) by mouth every evening. 3/20/25 3/20/26  Tony Mendoza MD   LIDOcaine (LIDODERM) 5 % Apply topically. 6/17/24   Provider, Historical   losartan (COZAAR) 50 MG tablet Take 50 mg by mouth once daily.    Provider, Historical   megestroL (MEGACE) 40 MG Tab Take 1 tablet (40 mg total) by mouth once daily. 5/13/25 5/13/26  Alexandru Zamora Jr., MD   metformin (GLUCOPHAGE) 500 MG tablet  Take 500 mg by mouth 2 (two) times daily with meals.    Provider, Historical   miconazole NITRATE 2 % (MICOTIN) 2 % top powder Apply topically. 6/17/24   Provider, Historical   omeprazole (PRILOSEC) 20 MG capsule Take 20 mg by mouth 2 (two) times daily.     Provider, Historical   pravastatin (PRAVACHOL) 40 MG tablet Take 40 mg by mouth once daily.    Provider, Historical   semaglutide (OZEMPIC SUBQ) Inject into the skin.    Provider, Historical   semaglutide (OZEMPIC) 2 mg/dose (8 mg/3 mL) PnIj Inject 2mg into the skin once weekly 5/19/25      tadalafil (CIALIS) 20 MG Tab Use one tablet every 36 hours 2/25/16   Giovanni Sawyer DO   tamsulosin (FLOMAX) 0.4 mg Cap Take 2 capsules (0.8 mg total) by mouth once daily. 12/12/24 12/12/25  Alexandru Zamora Jr., MD     Anticoagulants/Antiplatelets:argatroban    Allergies:   Review of patient's allergies indicates:   Allergen Reactions    Heparin analogues      HIT    Ether for anesthesia Nausea And Vomiting     Sedation History:  no adverse reactions    Review of Systems:   Hematological: no known coagulopathies  Respiratory: no shortness of breath  Cardiovascular: no chest pain  Gastrointestinal: no abdominal pain  Genito-Urinary: no dysuria  Musculoskeletal: negative  Neurological: no TIA or stroke symptoms         OBJECTIVE:     Vital Signs (Most Recent)  Temp: 98.8 °F (37.1 °C) (06/23/25 0301)  Pulse: 99 (06/23/25 0601)  Resp: (!) 21 (06/23/25 0601)  BP: 107/66 (06/23/25 0601)  SpO2: 100 % (06/23/25 0601)    Physical Exam:  ASA: 3  Mallampati: 3   Access: 20G PIV    General: no acute distress  Mental Status: alert and oriented to person, place and time  HEENT: normocephalic, atraumatic  Chest: unlabored breathing  Heart: regular heart rate  Abdomen: distended  Extremity: moves all extremities    ASSESSMENT/PLAN:     Sedation Plan: up to moderate  Patient will undergo: thrombectomy of clot attached to IVC filter    Jonnie Sneed  Diagnostic Radiology PGY-2

## 2025-06-23 NOTE — ASSESSMENT & PLAN NOTE
History of  EKG reviewed, Echo pending   SBP < 160. MAP>65  Holding home meds given pt hypotensive s/p cardiac arrest

## 2025-06-23 NOTE — ASSESSMENT & PLAN NOTE
Intubated for acute hypoxemic respiratory failure 2/2 suspected massive PE on 6/23    - Initial ABG w/ hypercapnia and hypoxemic, improved on repeat   - Wean vent as tolerated for goal SpO2>90%  - Goal eucarbia  - S/p tPA for PE, argatroban gtt on hold for acute DIC, restart as able   - Daily CXR   - Famotidine while intubated

## 2025-06-23 NOTE — PLAN OF CARE
[facility] Care Plan    POC reviewed with Franko Smith and family at 0300. Patient verbalized understanding. Questions and concerns addressed. No acute events today. Pt progressing toward goals. Will continue to monitor. See below and flowsheets for full assessment and VS info.     Bath complete  PRN for Nausea  Tylenol x 1  Argatroban at 0.5        Is this a stroke patient? yes- Stroke booklet reviewed with patient and is at bedside.   Care Plan Individualization:     Neuro:  [place] Coma Scale  Best Eye Response: 4-->(E4) spontaneous  Best Motor Response: 6-->(M6) obeys commands  Best Verbal Response: 5-->(V5) oriented  [place] Coma Scale Score: 15  Assessment Qualifiers: Patient not sedated/intubated, No eye obstruction present  Pupil PERRLA: yes     24 hr Temp:  [98.4 °F (36.9 °C)-98.9 °F (37.2 °C)]     CV:   Rhythm: normal sinus rhythm  BP goals:   SBP < 140  MAP > 65    Resp:           Plan: N/A    GI/:     Diet/Nutrition Received: NPO  Last Bowel Movement: 06/20/25  Voiding Characteristics: voids spontaneously without difficulty    Intake/Output Summary (Last 24 hours) at 6/23/2025 0626  Last data filed at 6/23/2025 0601  Gross per 24 hour   Intake 453.11 ml   Output 200 ml   Net 253.11 ml     Unmeasured Output  Unmeasured Urine Occurrence: 2    Labs/Accuchecks:  Recent Labs   Lab 06/23/25  0001   WBC 7.26   RBC 2.77*   HGB 7.7*   HCT 24.5*   *      Recent Labs   Lab 06/23/25  0001      K 4.7   CO2 17*      BUN 26*   CREATININE 1.5*   ALKPHOS 68   ALT 12   AST 12   BILITOT 1.0      Recent Labs   Lab 06/21/25  1156 06/21/25  1355 06/23/25  0531   PROTIME 13.8*  --   --    INR 1.3*  --   --    APTT 31.6   < > 73.5*    < > = values in this interval not displayed.      Recent Labs   Lab 06/19/25  0642          Electrolytes: N/A - electrolytes WDL  Accuchecks: Q6H    Gtts:   argatroban in 0.9 % sod chlor  0-10 mcg/kg/min Intravenous Continuous 3.1 mL/hr at 06/23/25 0601 0.5 mcg/kg/min at  06/23/25 0601       LDA/Wounds:    Johnathan Risk Assessment  Sensory Perception: 4-->no impairment  Moisture: 4-->rarely moist  Activity: 1-->bedfast  Mobility: 3-->slightly limited  Nutrition: 3-->adequate  Friction and Shear: 3-->no apparent problem  Johnathan Score: 18  Is your johnathan score 12 or less? no          Restraints:        WCTM     Problem: Adult Inpatient Plan of Care  Goal: Plan of Care Review  Outcome: Progressing  Goal: Patient-Specific Goal (Individualized)  Outcome: Progressing  Goal: Absence of Hospital-Acquired Illness or Injury  Outcome: Progressing  Goal: Optimal Comfort and Wellbeing  Outcome: Progressing  Goal: Readiness for Transition of Care  Outcome: Progressing     Problem: Stroke, Intracerebral Hemorrhage  Goal: Optimal Coping  Outcome: Progressing  Goal: Effective Bowel Elimination  Outcome: Progressing  Goal: Optimal Cerebral Tissue Perfusion  Outcome: Progressing  Goal: Optimal Cognitive Function  Outcome: Progressing  Goal: Effective Communication Skills  Outcome: Progressing  Goal: Optimal Functional Ability  Outcome: Progressing  Goal: Optimal Nutrition Intake  Outcome: Progressing  Goal: Optimal Pain Control and Function  Outcome: Progressing  Goal: Effective Oxygenation and Ventilation  Outcome: Progressing  Goal: Improved Sensorimotor Function  Outcome: Progressing  Goal: Safe and Effective Swallow  Outcome: Progressing  Goal: Effective Urinary Elimination  Outcome: Progressing     Problem: Diabetes Comorbidity  Goal: Blood Glucose Level Within Targeted Range  Outcome: Progressing     Problem: Fall Injury Risk  Goal: Absence of Fall and Fall-Related Injury  Outcome: Progressing     Problem: Skin Injury Risk Increased  Goal: Skin Health and Integrity  Outcome: Progressing

## 2025-06-23 NOTE — NURSING
Pt arrived to 189 for venogram. Pt oriented to unit and staff. Plan of care reviewed with patient, patient verbalizes understanding. Comfort measures utilized. Pt safely transferred from stretcher to procedural table. Fall risk reviewed with patient, fall risk interventions maintained. Safety strap applied, positioner pillows utilized to minimize pressure points. Blankets applied. Pt prepped and draped utilizing standard sterile technique. Patient placed on continuous monitoring, as required by sedation policy. Timeouts completed utilizing standard universal time-out, per department and facility policy. RN to remain at bedside, continuous monitoring maintained. Pt resting comfortably. Denies pain/discomfort. Will continue to monitor. See flow sheets for monitoring, medication administration, and updates.

## 2025-06-23 NOTE — EICU
Virtual ICU Quality Rounds    Admit Date: 6/20/2025  Hospital Day: 2    ICU Day: 2d 1h    24H Vital Sign Range:  Temp:  [98.4 °F (36.9 °C)-98.9 °F (37.2 °C)]   Pulse:  []   Resp:  [15-33]   BP: ()/(53-85)   SpO2:  [99 %-100 %]     VICU Surveillance Screening                    LDA reconciliation : Yes

## 2025-06-23 NOTE — PROGRESS NOTES
[facility] - Neuro Critical Care  Neurocritical Care  Progress Note    Admit Date: 6/20/2025  Service Date: 06/23/2025  Length of Stay: 2    Subjective:     Chief Complaint: ICH (intracerebral hemorrhage)    History of Present Illness: 78 y/o M with a PMH of prostate CA (diagnosed 2023 s/p radiation), PE (diagnosed in Feb of 2024, was previously on Eliquis until recent IPH), RLE DVT, HTN, and HLD who presents to [facility] ED from [facility] rehab with increased nausea/vomiting. He initially was admitted to [facility] w/ R sided numbness/tingling and dizziness for two days prior. He initially reported the numbness started on the R side of his face and had extended to his R hand and R side of his abdomen without any RLE numbness/tingling. 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 Per chart review, he had minimal dysmetria in the RUE while at [facility], but otherwise had no focal deficits. MRI with and without contrast was brain negative for any mass. An IVC filter was placed, and his oral AC was not resumed. He was transferred to a rehab facility, and was started on SQH for DVT PPX. He then transferred to [facility] ED for complaints of increased nausea, vomiting, and a headache. On arrival to [facility] ED, he was neuro intact. A CT head revealed similar appearance of previously known lesions, and new, small supratentorial lesions were noted. There was a CT abdomen with IV contrast that was completed for malignancy evaluation which was negative for malignancy but did reveal lower extremity DVT extending up to the femoral vein. Apparent filling defect in the IVC above and surrounding the IVC filter could represent thrombus or mixing of IV contrast. BUE and BLE US pending. MRI with and without also pending. NCC was consulted for IPH. Due tot he need for anticoagulation therapy due to DVT and potential IVC filter thrombosis, he will  be admitted to [facility] for close monitoring and higher level of care.    Hospital Course: 06/22/2025 Pt is HIT positive. Heparin gtt discontinued and started on argatroban yesterday. Therapeutic CTH stable. WADE pending. Plt improved to 90. Hematology consulted and recommending removal of the IVC filter given its prothrombotic risk. IR was consulted for evaluation. ZOE improved, Cr 1.5- discontinue IVF. Stable CTH on therapeutic argatroban. Transition to Eliquis 10mg BID for 6 more days then 5mg BID. Scopolamine patch ordered for nausea.    06/23/2025 Pt transitioned back to argatroban gtt per IR recommendations, PTT therapeutic overnight, went to IR this AM for IVC retrieval and clot removal -> coded in procedure, likely PE from piece of clot breaking off during attempted retrieval. Given IV tPA 50 mg in code, epi x4, ROSC achieved after ~12-15 minutes, please see significant event note for full details. Family updated extensively at bedside s/p code.     Interval History:  Please see hospital course above for full details    Review of Systems   Unable to perform ROS: Intubated       Objective:     Vitals:  Temp: 97.9 °F (36.6 °C)  Pulse: 102  Rhythm: sinus tachycardia  BP: 116/66  MAP (mmHg): 84  Resp: (!) 26  ETCO2 (mmHg): 29 mmHg  SpO2: 96 %  Oxygen Concentration (%): 80  Vent Mode: A/C  Set Rate: 18 BPM  Vt Set: 450 mL  PEEP/CPAP: 10 cmH20  Peak Airway Pressure: 26 cmH20  Mean Airway Pressure: 14 cmH20  Plateau Pressure: 0 cmH20    Temp  Min: 97.9 °F (36.6 °C)  Max: 98.9 °F (37.2 °C)  Pulse  Min: 95  Max: 131  BP  Min: 97/67  Max: 142/85  MAP (mmHg)  Min: 69  Max: 107  Resp  Min: 15  Max: 33  ETCO2 (mmHg)  Min: 26 mmHg  Max: 31 mmHg  SpO2  Min: 96 %  Max: 100 %  Oxygen Concentration (%)  Min: 80  Max: 100    06/22 0701 - 06/23 0700  In: 456.2 [I.V.:456.2]  Out: 200 [Urine:200]   Unmeasured Output  Unmeasured Urine Occurrence: 2        Physical Exam  General Appearance: Elderly gentleman, intubated, sedated  Mental  Status Exam: No response to verbal or noxious stimuli. Not tracking/regarding, not following commands  Cranial Nerves: Gaze midline, pupils 3 and minimally reactive b/l. +OCRs, +corneals to saline b/l. Gag/cough deferred given bleeding risk  Motor: Flicker to noxious x4 extremities, no clear asymmetry   Sensory: No clear response to noxious x4 extremities  Coordination: Unable to assess  Vascular: S1/S2 of normal intensity, no S3/S4 appreciated, no murmurs appreciated  Lungs: Coarse breath sounds b/l  Abdomen: Soft, non-distended, non-tender, hypoactive BS +         Medications:  Continuousargatroban in 0.9 % sod chlor, Last Rate: Stopped (06/23/25 1237)  fentanyl, Last Rate: 50 mcg/hr (06/23/25 1218)  lactated ringers, Last Rate: 75 mL/hr at 06/23/25 1148  NORepinephrine 4 mg in D5W 250 mL infusion, Last Rate: 0.2 mcg/kg/min (06/23/25 0955)  NORepinephrine bitartrate-D5W, Last Rate: 0.22 mcg/kg/min (06/23/25 1227)  propofoL, Last Rate: 20 mcg/kg/min (06/23/25 1036)    Scheduledfamotidine (PF), 20 mg, Daily  mupirocin, , BID  polyethylene glycol, 17 g, Daily  pravastatin, 40 mg, Daily  senna-docusate, 1 tablet, BID  [START ON 6/24/2025] silodosin, 4 mg, Daily    PRN0.9%  NaCl infusion (for blood administration), , Q24H PRN  0.9%  NaCl infusion (for blood administration), , Q24H PRN  acetaminophen, 650 mg, Q6H PRN  alteplase, 50 mg, PRN  clonazePAM, 0.5 mg, Daily PRN  dextrose 50%, 12.5 g, PRN  EPINEPHrine, , Code/trauma/sedation Med  EPINEPHrine, , Code/trauma/sedation Med  EPINEPHrine, , Code/trauma/sedation Med  EPINEPHrine, , Code/trauma/sedation Med  fentanyl, 25 mcg, Q2H PRN  glucagon (human recombinant), 1 mg, PRN  hydrALAZINE, 10 mg, Q4H PRN  insulin aspart U-100, 0-5 Units, Q6H PRN  labetalol, 10 mg, Q4H PRN  NORepinephrine 4 mg in D5W 250 mL infusion, , Code/Trauma/sedation Continuous Med  ondansetron, 4 mg, Q4H PRN  promethazine (PHENERGAN) 12.5 mg in 0.9% NaCl 50 mL IVPB, 12.5 mg, Q6H PRN  sodium chloride  0.9%, 10 mL, PRN      Today I personally reviewed pertinent imaging, cardiology results, laboratory results, notably: Pre-IR labs overnight w/ stable anemia/thrombocytopenia, otherwise unremarkable. Post-code labs notable for lactate 3.8 -> 3.9, ABG 7.009/66.7/140/-14 immediately post code, improved to 7.293/37.1/380 on recheck. CBC w/ Hb 6.2, platelets stably low at 97. PT 54.9, INR 5.7, aPTT>150, fibrinogen<70. CMP w/ uptrending creatinine 1.5 -> 1.7, mild transaminitis 90/89. BNP<10, trop 73. CXR w/ ET tube in correct position. TTE pending    Diet  Diet NPO  Diet NPO  NPO, no enteral access, hold on placing given critically ill state and markedly abnormal coags     Assessment/Plan:     Neuro  * ICH (intracerebral hemorrhage)  Multifocal IPH on 6/9 at OSH, likely in setting of therapeutic AC (on Eliquis) and multifocal cavernomas. 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 Started on AC w/ monitoring in NCC given recent IPH  IPH stable on repeat imaging, NSGY following, no surgical intervention at this time  Pt w/ PEA arrest on 6/23 2/2 massive PE, s/p IV tPA, now in acute DIC -> stat repeat head CT when stable for transport  Admit to [facility]  Q1h neuro checks, vitals, I/Os  Was previously on Eliquis for DVT/PE  Reversed earlier this month at [facility] and has not been restarted since  Pt is HIT positive. Heparin gtt transitioned to argatroban gtt. Therapeutic CTH stable   Diet NPO  PT/OT/SLP -> on hold due to critical illness  VTE Prophylaxis: mechanical, argatroban gtt on hold due to acute DIC  S/p IVC filter placement and subsequent removal on 6/23  Restart argatroban once PTT<80, INR<2 and fibrinogen>150       Numbness and tingling of right upper extremity  See primary  problem    Pulmonary  Acute respiratory failure with hypoxia  Patient with Hypoxic Respiratory failure which is Acute.  he is not on home oxygen. Supplemental oxygen was provided and noted- Vent Mode: A/C  Oxygen Concentration (%):  [] 80  Resp Rate Total:  [21 br/min-69 br/min] 25 br/min  Vt Set:  [450 mL] 450 mL  PEEP/CPAP:  [10 cmH20] 10 cmH20  Mean Airway Pressure:  [14 plF99-92 cmH20] 14 cmH20    .   Signs/symptoms of respiratory failure include- cyanosis and cardiac arrest 2/2 hypoxemia. Contributing diagnoses includes - Pulmonary Embolus Labs and images were reviewed. Patient Has recent ABG, which has been reviewed. Will treat underlying causes and adjust management of respiratory failure as follows- s/p IV tPA. Holding argatroban gtt pending stabilization of acute DIC labs.     On mechanically assisted ventilation  Intubated for acute hypoxemic respiratory failure 2/2 suspected massive PE on 6/23    - Initial ABG w/ hypercapnia and hypoxemic, improved on repeat   - Wean vent as tolerated for goal SpO2>90%  - Goal eucarbia  - S/p tPA for PE, argatroban gtt on hold for acute DIC, restart as able   - Daily CXR   - Famotidine while intubated    Cardiac/Vascular  Shock  Hypotensive following PEA arrest on 6/23    - Suspect multifactorial shock, hemorrhagic +/- component of vasoplegia 2/2 cardiac arrest, may have component of cardiogenic 2/2 suspected massive PE  - Hb 6.2 s/p cardiac arrest, 2 u PRBCs  - LR @ 75 cc/hr  - TTE, ECG, trop pending  - Trend lactate q6hrs   - Wean levophed gtt as tolerated for goal MAP>65, SBP<160     No evidence of infection prior to procedure, low clinical suspicion for septic shock at this time    Cardiac arrest  S/p PEA arrest on 6/23 during IR procedure for clot removal and IVC filter retrieval  ROSC achieved after ~12-15 minutes, s/p epi x4, IV tPA 50 mg x1  Etiology presumed PE     - Not a candidate for TTM given hypercoaguable state, strict normothermia  - Head CT when  able   - MRI brain w/o contrast to assess for anoxic brain injury x72 hrs (6/26)  - Trend lactate, trop, CBC, CMP, coags q6hrs  - Place nguyen, pt at high risk for ATN    Family updated extensively at bedside. Prognosis guarded.     Hyperlipidemia  History of  Statin -> on hold due to lack of enteral access  Lipid panel reviewed    Essential hypertension  History of  EKG reviewed, Echo pending   SBP < 160. MAP>65  Holding home meds given pt hypotensive s/p cardiac arrest    Hematology  HIT (heparin-induced thrombocytopenia)  Noted at rehab facility prior to admission, HIT antibody+, WADE pending    - Briefly on heparin gtt on 6/21, d/c'd s/p receiving notification from rehab facility of positive antibody testing  - Argatroban gtt -> on hold due to DIC    DIC (disseminated intravascular coagulation)  Lab values s/p cardiac arrest consistent w/ acute DIC, likely provoked in setting of HIT and cardiac arrest    - s/p IV tPA 50 mg x1 during arrest, given at 0952  - Hold argatroban gtt  - Monitor PT/INR, PTT, fibrinogen, CBC q6hrs    - Restart argatroban gtt once PTT<80, INR<2, fibrinogen>150   - Transfuse PRBCs for Hb<7, platelet goal >20k    Hold on administering cryo/FFP given high risk of recurrent thrombosis unless pt w/ worsening bleeding and/or persistent transfusion requirement    Deep vein thrombosis (DVT) of lower extremity  History of  Was previously on Eliquis for DVT/PE  BLE US at [facility] earlier this month revealed RLE popliteal DVT  CT abdomen pelvis with IV contrast at [facility] showed extension of DVT to the femoral vein with potential clot around the IVC filter  Pt is HIT positive.   Hematology consulted and recommending removal of the IVC filter given its prothrombotic risk -> IVC filter removed 6/23, unable to fully remove clot prior to patient coding  WADE pending  S/p IV tPA on 6/23 during cardiac arrest, argatroban gtt currently on hold due to profoundly abnormal coags  Restart argatroban gtt once aPTT<80,  INR<2, fibrinogen>150    Oncology  Prostate cancer  History of  Diagnosed in 2023, s/p radiation therapy, was previously on hormonal therapy (recently discontinued once admitted to [facility] earlier this month)  CT chest/abdomen/pelvis with hepatic and renal lesions, negative for other primary malignancy  Generalized muscle weakness due to cancer and hormonal therapy  Follows regularly with Oncology    Endocrine  Diabetes mellitus  History of  Hold home meds  SSI PRN  A1C 6.8%    GI  Gastro-esophageal reflux disease without esophagitis  History of  C/w famotidine           The patient is being Prophylaxed for:  Venous Thromboembolism with: Mechanical  Stress Ulcer with: H2B  Ventilator Pneumonia with: chlorhexidine oral care    Activity Orders            Diet NPO: NPO starting at 06/23 0001    Turn patient starting at 06/21 0600    Elevate HOB starting at 06/21 0552          Full Code    Uninterrupted Critical Care/Counseling Time (not including procedures): 74 minutes  There is high probability for acute neurological change leading to clinical and possibly life-threatening deterioration requiring highest level of physician preparedness for urgent intervention. Critical care time was spent personally by me on the following activities: development of treatment plan with patient or surrogate and bedside caregivers, discussions with consultants, evaluation of patient's response to treatment, examination of patient, ordering and performing treatments and interventions, ordering and review of laboratory studies, ordering and review of radiographic studies, pulse oximetry, antibiotic titration if applicable, vasopressor titration if applicable, re-evaluation of patient's condition. I spent greater than 50% of the documented critical care time assessing and making medical decisions for this patient.     Anne Mabry MD  Neurocritical Care  [facility] - Neuro Critical Care

## 2025-06-24 PROBLEM — N17.9 AKI (ACUTE KIDNEY INJURY): Status: ACTIVE | Noted: 2025-06-24

## 2025-06-24 PROBLEM — G93.40 ENCEPHALOPATHY: Status: ACTIVE | Noted: 2025-06-24

## 2025-06-24 PROBLEM — R74.01 TRANSAMINITIS: Status: ACTIVE | Noted: 2025-06-24

## 2025-06-24 LAB
OHS QRS DURATION: 136 MS
OHS QTC CALCULATION: 520 MS

## 2025-06-24 NOTE — NURSING
0200 - Fibrinogen 157, PTT 71.5   0324 - Argatroban gtt re-started per SALEEM Cruz verbal order   0630 - PTT resulted >150. Blood drawn from arterial line on opposite side that argatroban gtt is infusing. Per nomogram, pause gtt until PTT <80. Next PTT re-draw @0830. [facility] team notified.

## 2025-06-24 NOTE — HPI
79 year old male with a PMHx of HTN, HLD, DVT/PE previously on Eliquis until recent admission at [facility] for 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, and prostate cancer s/p radiation who presented to the ER on 6/20 from [facility] rehab for N/V. HPI per chart review as patient currently intubated. CT abdomen/pelvis with substantial filling defects above and around IVC filater c/w DVTs. HIT panel positive. Patient admitted to [facility] for higher level of care and further management of new BLE DVTs with extension to IVC filter in setting of recent IPHs. Hospital course further complicated by ZOE, s/p IVC removal and transitioned to Eliquis, PEA arrest during IR procedure for IVC retrieval with ROSC after ~12-15 minutes and myoclonus. CTH with evolving left dorsal pontine and smaller bilateral cerebellar recent to subacute parenchymal hemorrhages; no significant increased size lesion. EEG placed 6/24 with preliminary findings of severe encephalopathy, continuous 1-2hz right LPDs, no discrete seizures. Epilepsy following for assistance in management.

## 2025-06-24 NOTE — EICU
VICU Night Rounds Checklist  24H Vital Sign Range:  Temp:  [97.9 °F (36.6 °C)-98.7 °F (37.1 °C)]   Pulse:  []   Resp:  [13-35]   BP: ()/(52-76)   SpO2:  [94 %-100 %]   Arterial Line BP: (107-144)/(54-79)     Video rounds and LDA reconciliation

## 2025-06-24 NOTE — HOSPITAL COURSE
EEG 6/24>6/25: severe encephalopathy, 1-2hz right LPDs with some improvement throughout study, no discrete seizures; PBs for myoclonus with no EEG correlate  EEG 6/25>6/26: occasional right sharps, overall improved, no seizures, PB ~0200 with no EEG correlate    See EEG reports for details.

## 2025-06-24 NOTE — PLAN OF CARE
[facility] Care Plan  POC reviewed with Franko JUAREZ Luis and family at 1700. Family verbalized understanding. Questions and concerns addressed. No acute events today. Pt progressing toward goals. Will continue to monitor. See below and flowsheets for full assessment and VS info.     - cEEG placed  - Tylenol x2 for elevated temp  - OGT placed and ok to use  - Trickle Feed TF started  - Ok to Suction  - Levo titrated  - LR @ 75 cc/hr  - Jack in place  - Argatroban gtt paused according to nomogram    Is this a stroke patient? yes- Stroke booklet reviewed with family and is at bedside.   Care Plan Individualization:     Neuro:  [place] Coma Scale  Best Eye Response: 2-->(E2) to pain  Best Motor Response: 4-->(M4) withdraws from pain  Best Verbal Response: 1-->(V1) none  [place] Coma Scale Score: 7  Assessment Qualifiers: Patient intubated, No eye obstruction present  Pupil PERRLA: yes     24 hr Temp:  [98 °F (36.7 °C)-99.9 °F (37.7 °C)]     CV:   Rhythm: normal sinus rhythm  BP goals:   SBP < 160  MAP > 65    Resp:      Vent Mode: A/C  Set Rate: 16 BPM  Oxygen Concentration (%): 40  Vt Set: 450 mL  PEEP/CPAP: 8 cmH20    Plan: N/A    GI/:     Diet/Nutrition Received: NPO  Last Bowel Movement: 06/20/25  Voiding Characteristics: urethral catheter (bladder)    Intake/Output Summary (Last 24 hours) at 6/24/2025 1735  Last data filed at 6/24/2025 1702  Gross per 24 hour   Intake 5437.4 ml   Output 965 ml   Net 4472.4 ml     Unmeasured Output  Unmeasured Urine Occurrence: 2    Labs/Accuchecks:  Recent Labs   Lab 06/24/25  0816   WBC 9.15   RBC 2.74*   HGB 7.7*   HCT 22.8*   *      Recent Labs   Lab 06/24/25  0203 06/24/25  0816   * 135*   K 5.1 5.0   CO2 17* 19*    108   BUN 35* 35*   CREATININE 2.0* 1.9*   ALKPHOS 63  --    ALT 72*  --    AST 78*  --    BILITOT 4.3*  --       Recent Labs   Lab 06/24/25  0816 06/24/25  1125 06/24/25  1410   PROTIME 46.4*  --   --    INR 4.7*  --   --    APTT >150.0*   < > 98.8*     < > = values in this interval not displayed.      Recent Labs   Lab 06/19/25  0642          Electrolytes: Electrolytes replaced  Accuchecks: Q4H    Gtts:   argatroban in 0.9 % sod chlor  0-10 mcg/kg/min Intravenous Continuous   Stopped at 06/24/25 0630    lactated ringers   Intravenous Continuous 75 mL/hr at 06/24/25 1702 Rate Verify at 06/24/25 1702       LDA/Wounds:    Johnathan Risk Assessment  Sensory Perception: 2-->very limited  Moisture: 4-->rarely moist  Activity: 1-->bedfast  Mobility: 1-->completely immobile  Nutrition: 1-->very poor  Friction and Shear: 2-->potential problem  Johnathan Score: 11    Is your johnathan score 12 or less? yes enrolled in the peach program and heel boots on      [facility]

## 2025-06-24 NOTE — CONSULTS
[facility] Neuro Critical Care  Adult Nutrition  Consult Note    SUMMARY     Recommendations  --Continuous TF recommendation: Impact Peptide 1.5 @ 55 mL/hr to provide 1320 mL total volume, 1980 kcal, 185 g CHO, 124 g protein, 0 g fiber, 1019 mL free water, 132% DRI         --160 mL flush q4 hrs to provide additional 960 mL free water (Total 1979 mL)    --Add Prosource daily to provide an additional 15 g protein     --Nursing: please continue to document rate and formula on flowsheet    --RD to monitor weight, rate, tolerance    Goals: Meet % EEN/EPN by next RD follow-up  Nutrition Goal Status: new  Communication of RD Recs:  (POC)    Nutrition Discharge Planning     Nutrition Discharge Planning: Too early to determine, pending clinical course    Reason for Assessment    Reason For Assessment: consult (TF recs)  Diagnosis: hemorrhage (ICH (intracerebral hemorrhage))  General Information Comments: 78 y/o M with a PMH of prostate CA (diagnosed 2023 s/p radiation), PE (diagnosed in Feb of 2024, was previously on Eliquis until recent IPH), RLE DVT, HTN, and HLD who [place] [facility] from [facility] with increased nausea/vomiting. RD consult for TF recs. Pt intubated and sedated - Vtot 11.5 L/m. NG in place. Insignificant weight loss - no concerns for malnutrition at this time.     Nutrition Related Social Determinants of Health: SDOH: Adequate food in home environment     Food Insecurity: No Food Insecurity (6/21/2025)    Hunger Vital Sign     Worried About Running Out of Food in the Last Year: Never true     Ran Out of Food in the Last Year: Never true     Nutrition/Diet History    Spiritual, Cultural Beliefs, Catholic Practices, Values that Affect Care: no  Food Allergies: NKFA  Factors Affecting Nutritional Intake: NPO, on mechanical ventilation    Anthropometrics    Height: 6' (182.9 cm)  Height (inches): 72 in  Height Method: Measured  Weight: 103.9 kg (229 lb 0.9 oz)  Weight (lb): 229.06 lb  Weight  Method: Bed Scale  Ideal Body Weight (IBW), Male: 178 lb  % Ideal Body Weight, Male (lb): 128.69 %  BMI (Calculated): 31.1  BMI Grade: 30 - 34.9- obesity - grade I       Lab/Procedures/Meds    Pertinent Labs Reviewed: reviewed - Na 135, BUN 35, creatinine 1.9, eGFR 35, glucose 179, AST 78, ALT 72, cholesterol 111, HDL 32, LDL 54.2, A1c 6.8, avg glucose 148     Pertinent Medications Reviewed: reviewed - bowel reg, pravastatin    Estimated/Assessed Needs    Weight Used For Calorie Calculations: 103.9 kg (229 lb 0.9 oz)  Energy Calorie Requirements (kcal): 2117 kcal/day  Energy Need Method: [facility] (modified)  Protein Requirements: 162 g/day (2.0 g/kg IBW)  Weight Used For Protein Calculations: 80.9 kg (178 lb 5.6 oz)     Estimated Fluid Requirement Method: RDA Method  RDA Method (mL): 2117  CHO Requirement: 265 g/day      Nutrition Prescription Ordered    Current Diet Order: NPO  Current Nutrition Support Formula Ordered: Novasource Renal  Current Nutrition Support Rate Ordered: 10 (ml)  Current Nutrition Support Frequency Ordered: x 24 hours    Evaluation of Received Nutrient/Fluid Intake    Enteral Calories (kcal): 480  Enteral Protein (gm): 19  Enteral (Free Water) Fluid (mL): 168  % Kcal Needs: 23  % Protein Needs: 12  Energy Calories Required: not meeting needs  Protein Required: not meeting needs  Fluid Required:  (Per MD)  Comments: LBM 6/20  % Intake of Estimated Energy Needs: 0 - 25 %  % Meal Intake: NPO    PES Statement  Inadequate enteral nutrition infusion related to  (Infusion volume not reached) as evidenced by  (Inadequate EN volume compared to estimated requirements)  Status: New    Nutrition Risk    Level of Risk/Frequency of Follow-up: moderate - high       Monitor and Evaluation    Monitor and Evaluation: Enteral and parenteral nutrition administration, Weight, Electrolyte and renal panel, Gastrointestinal profile, Glucose/endocrine profile, Inflammatory profile, Lipid profile, Nutrition focused  physical findings, Skin       Nutrition Follow-Up    RD Follow-up?: Yes

## 2025-06-24 NOTE — PT/OT/SLP PROGRESS
Occupational Therapy      Patient Name:  Franko Smith   MRN:  [id number]    Patient not seen today secondary to significant event 6/23 followed by increased medical instability, Patient also intubated and on EEG . Will follow-up as appropriate.    6/24/2025

## 2025-06-24 NOTE — PROGRESS NOTES
Pharmacokinetic Follow-Up Assessment: IV Vancomycin    Assessment/Plan:    - Vanc random level, drawn ~9 hours post loading dose, is therapeutic at 15 mcg/mL  - Dosing by level in setting of ZOE  - Administer vanc 1000 mg x 1  - Draw 24-hr random level tomorrow 6/25 at 1000  - Will re-dose based on level and renal function    Pharmacy will continue to follow and monitor vancomycin.    Please contact pharmacy at extension [phone] with any questions regarding this assessment.     Thank you for the consult,   Maria R Bender, PharmD, [facility]  Neurocritical Care Pharmacist  [phone]         Patient brief summary:  Franko Smith is a 79 y.o. male initiated on antimicrobial therapy with IV Vancomycin for treatment of suspected pneumonia    Drug Allergies:   Review of patient's allergies indicates:   Allergen Reactions    Heparin analogues      HIT    Ether for anesthesia Nausea And Vomiting       Actual Body Weight:   104.3 kg    Renal Function:   Estimated Creatinine Clearance: 38.6 mL/min (A) (based on SCr of 1.9 mg/dL (H)).,     Dialysis Method (if applicable):  N/A    CBC (last 72 hours):  Recent Labs   Lab Result Units 06/21/25  2048 06/21/25  2357 06/23/25  0001 06/23/25  1030 06/23/25  2052 06/24/25  0203 06/24/25  0816   WBC K/uL 7.12 6.21 7.26 9.26 8.78 8.83 9.15   HGB gm/dL 8.2* 7.7* 7.7* 6.2* 8.3* 7.9* 7.7*   HCT % 26.0* 24.8* 24.5* 20.8* 25.2* 23.2* 22.8*   Platelet Count K/uL 82* 90* 114* 97* 109* 112* 142*   Lymph % % 13.5* 11.0* 14.7* 24.3 5.1* 8.4* 10.2*   Mono % % 12.2 11.6 12.8 5.8 8.0 8.5 9.6   Eos % % 2.0 2.3 1.7 1.0 0.0 0.1 0.5   Basophil % % 0.4 0.2 0.4 0.2 0.1 0.1 0.2       Metabolic Panel (last 72 hours):  Recent Labs   Lab Result Units 06/21/25  1355 06/21/25  2357 06/22/25  1340 06/22/25  1636 06/23/25  0001 06/23/25  1030 06/23/25  1808 06/23/25  2041 06/23/25  2052 06/24/25  0203 06/24/25  0816   Sodium mmol/L  --  139  --   --  137 138 133*  --  133* 135* 135*   Potassium mmol/L  --  4.2  --   --   4.7 4.3 6.0*  --  5.2* 5.1 5.0   Chloride mmol/L  --  109  --   --  109 109 107  --  109 108 108   CO2 mmol/L  --  19*  --   --  17* 16* 14*  --  16* 17* 19*   Glucose mg/dL  --  116*  --   --  113* 202* 281*  --  295* 211* 179*   Glucose, UA   --   --   --   --   --   --   --  3+*  --   --   --    BUN mg/dL  --  28*  --   --  26* 27* 33*  --  33* 35* 35*   Creatinine mg/dL 1.5* 1.5*  --  1.5* 1.5* 1.7* 1.8*  --  1.9* 2.0* 1.9*   Albumin g/dL 3.5 3.5 3.4* 3.4* 3.3* 2.4*  --   --   --  2.5*  --    Bilirubin Total mg/dL 1.3* 1.2* 1.2* 1.1* 1.0 1.2*  --   --   --  4.3*  --    ALP unit/L 69 64 69 70 68 63  --   --   --  63  --    AST unit/L 10* 13 13 12 12 90*  --   --   --  78*  --    ALT unit/L 13 14 14 14 12 89*  --   --   --  72*  --    Magnesium  mg/dL  --  2.3  --   --  2.5  --   --   --   --  2.3  --    Phosphorus Level mg/dL  --  4.5  --   --  4.4  --   --   --   --  4.4  --        Drug levels (last 3 results):  Recent Labs   Lab Result Units 06/24/25  0816   Vancomycin Random ug/ml 15.0       Microbiologic Results:  Microbiology Results (last 7 days)       Procedure Component Value Units Date/Time    Culture, Respiratory with Gram Stain [[id number]] Collected: 06/24/25 0400    Order Status: Completed Specimen: Respiratory from Sputum Updated: 06/24/25 1137     GRAM STAIN <10 Epithelial Cells/LPF      Many WBC seen      Rare Gram Negative Rods      Rare Gram positive cocci      Rare Gram Positive Rods    Blood culture [[id number]]  (Normal) Collected: 06/23/25 1653    Order Status: Completed Specimen: Blood from Peripheral, Antecubital, Left Updated: 06/24/25 0205     Blood Culture No Growth After 6 Hours    Blood culture [[id number]]  (Normal) Collected: 06/23/25 1657    Order Status: Completed Specimen: Blood from Peripheral, Upper Arm, Right Updated: 06/24/25 0205     Blood Culture No Growth After 6 Hours    MRSA Screen by PCR [[id number]]  (Normal) Collected: 06/23/25 1635    Order Status: Completed  Specimen: Nasal Swab Updated: 06/23/25 2002     MRSA PCR Screen Not Detected

## 2025-06-24 NOTE — PT/OT/SLP PROGRESS
Physical Therapy      Patient Name:  Franko Smith   MRN:  [id number]    Patient not seen today secondary to intubated and on EEG. Patient not appropriate per early mobility protocol. Will follow-up as able.

## 2025-06-24 NOTE — PROCEDURES
EEG preliminary note  Reviewed from start at 09:05 to 09:44    - Continuous 1-2 hz right hemispheric LPDs +R  - Diffusely slow background composed of theta/delta activity without a PDR    Impression:  Highly epileptogenic focus in the right hemisphere, no discrete seizures so far but patient is at high risk.  Severe encephalopathy    April Callahan MD  [facility]   Department of Neurology/Epilepsy

## 2025-06-24 NOTE — PLAN OF CARE
Recommendations  --Continuous TF recommendation: Impact Peptide 1.5 @ 55 mL/hr to provide 1320 mL total volume, 1980 kcal, 185 g CHO, 124 g protein, 0 g fiber, 1019 mL free water, 132% DRI         --160 mL flush q4 hrs to provide additional 960 mL free water (Total 1979 mL)    --Add Prosource daily to provide an additional 15 g protein     --Nursing: please continue to document rate and formula on flowsheet    --RD to monitor weight, rate, tolerance    Goals: Meet % EEN/EPN by next RD follow-up  Nutrition Goal Status: new  Communication of RD Recs:  (POC)

## 2025-06-24 NOTE — ASSESSMENT & PLAN NOTE
PEA arrest during IR procedure for IVC retrieval with ROSC after ~12-15 minutes   - CTH 6/23 with evolving left dorsal pontine and smaller bilateral cerebellar recent to subacute parenchymal hemorrhages; no significant increased size lesion  - Management per NCC

## 2025-06-24 NOTE — ASSESSMENT & PLAN NOTE
Suspected to have PE resulting in cardiac arrest  - Heme/onc recommends holding Argatroban  - Management per Heme/onc and NCC

## 2025-06-24 NOTE — PLAN OF CARE
06/24/25 1615   Rounds   Attendance Provider;;Charge nurse   Discharge Plan A   (tbd)   Why the patient remains in the hospital Requires continued medical care   Transition of Care Barriers None     Leslie Shine MSW, LCSW  [facility]  Case Management Dept.

## 2025-06-24 NOTE — CONSULTS
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 HPI per chart review as patient currently intubated. CT abdomen/pelvis with substantial filling defects above and around IVC filater c/w DVTs. HIT panel positive. Patient admitted to [facility] for higher level of care and further management of new BLE DVTs with extension to IVC filter in setting of recent IPHs. Hospital course further complicated by ZOE, s/p IVC removal and transitioned to Eliquis, PEA arrest during IR procedure for IVC retrieval with ROSC after ~12-15 minutes and myoclonus. CTH with evolving left dorsal pontine and smaller bilateral cerebellar recent to subacute parenchymal hemorrhages; no significant increased size lesion. EEG placed 6/24 with preliminary findings of severe encephalopathy, continuous 1-2hz right LPDs, no discrete seizures. Epilepsy following for assistance in management.    Hospital Course:   EEG 6/24: preliminary findings of severe encephalopathy, continuous 1-2hz right LPDs, no discrete seizures; full EEG report to follow on 6/25    See EEG reports for details.     Past Medical History:   Diagnosis Date    Anxiety     Diabetes mellitus, type 2     GERD (gastroesophageal reflux disease)     Hyperlipidemia     Hypertension     Obesity (BMI 30-39.9)        Past Surgical History:   Procedure Laterality Date    HERNIA REPAIR      ULNAR NERVE REPAIR Left         Review of patient's allergies indicates:   Allergen Reactions    Heparin analogues      HIT    Ether for anesthesia Nausea And Vomiting       No current facility-administered medications on file prior to encounter.     Current Outpatient Medications on File Prior to Encounter   Medication Sig    acetaminophen (TYLENOL) 500 MG tablet Take 500 mg by mouth every 6 (six) hours as needed for Pain.    amLODIPine (NORVASC) 10 MG tablet Take 0.5 tablets by mouth once daily.    apixaban (ELIQUIS) 5 mg Tab Take 5 mg by mouth 2 (two) times daily.    clonazePAM (KLONOPIN) 0.5 MG tablet Take 1 tablet by mouth daily as needed.    diclofenac sodium (VOLTAREN) 1 % Gel Apply topically.    efinaconazole (JUBLIA) 10 % Karla Apply topically.    Hydrocortisone 1%-Econazole 1% Topical Cream Apply topically.    levocetirizine (XYZAL) 5 MG tablet Take 1 tablet (5 mg total) by mouth every evening.    LIDOcaine (LIDODERM) 5 % Apply topically.    losartan (COZAAR) 50 MG tablet Take 50 mg by mouth once daily.    megestroL (MEGACE) 40 MG Tab Take 1 tablet (40 mg total) by mouth once daily.    metformin (GLUCOPHAGE) 500 MG tablet Take 500 mg by mouth 2 (two) times daily with meals.    miconazole NITRATE 2 % (MICOTIN) 2 % top powder Apply topically.    omeprazole (PRILOSEC) 20 MG capsule Take 20 mg by mouth 2 (two) times daily.     pravastatin (PRAVACHOL) 40 MG tablet Take 40 mg by mouth once daily.    semaglutide (OZEMPIC SUBQ) Inject into the skin.    semaglutide (OZEMPIC) 2 mg/dose (8 mg/3 mL) PnIj Inject 2mg into the skin once weekly    tadalafil (CIALIS) 20 MG Tab Use one tablet every 36 hours    tamsulosin (FLOMAX) 0.4 mg Cap Take 2 capsules (0.8 mg total) by mouth once daily.     Continuous Infusions:   argatroban in 0.9 % sod chlor  0-10 mcg/kg/min Intravenous Continuous   Stopped at 06/24/25 0630    lactated ringers   Intravenous Continuous 75 mL/hr at 06/24/25 0802 Rate Verify at 06/24/25 0802       Family History       Problem  Relation (Age of Onset)    Cancer Father    Diabetes Sister, Brother    Hyperlipidemia Mother    Hypertension Mother          Tobacco Use    Smoking status: Never     Passive exposure: Never    Smokeless tobacco: Never   Substance and Sexual Activity    Alcohol use: Yes     Alcohol/week: 0.0 standard drinks of alcohol     Comment: social    Drug use: No    Sexual activity: Yes     Partners: Female     Review of Systems  Objective:     Vital Signs (Most Recent):  Temp: 99.1 °F (37.3 °C) (06/24/25 0702)  Pulse: 103 (06/24/25 0809)  Resp: (!) 23 (06/24/25 0809)  BP: (!) 111/57 (06/24/25 0802)  SpO2: 100 % (06/24/25 0809) Vital Signs (24h Range):  Temp:  [97.9 °F (36.6 °C)-99.1 °F (37.3 °C)] 99.1 °F (37.3 °C)  Pulse:  [] 103  Resp:  [13-40] 23  SpO2:  [94 %-100 %] 100 %  BP: ()/(52-76) 111/57  Arterial Line BP: (106-144)/(54-79) 110/54     Weight: 103.9 kg (229 lb)  Body mass index is 31.06 kg/m².     Physical Exam  Constitutional:       General: He is not in acute distress.     Appearance: He is ill-appearing. He is not diaphoretic.      Interventions: He is intubated.   HENT:      Head: Normocephalic and atraumatic.      Comments: EEG in place  Eyes:      General: No scleral icterus.        Right eye: No discharge.         Left eye: No discharge.      Conjunctiva/sclera: Conjunctivae normal.      Pupils: Pupils are equal, round, and reactive to light.   Cardiovascular:      Rate and Rhythm: Normal rate.   Pulmonary:      Effort: Pulmonary effort is normal. No respiratory distress. He is intubated.      Comments: Intubated  Musculoskeletal:         General: No deformity or signs of injury.   Skin:     General: Skin is warm and dry.   Psychiatric:      Comments: Unable to assess            NEUROLOGICAL EXAMINATION:     CRANIAL NERVES     CN III, IV, VI   Pupils are equal, round, and reactive to light.       Eyes open  No blink to threat OU   MATT OU   Corneal intact OU   Face symmetric   Tongue midline    Myoclonus  No spontaneous movements  Does not follow commands  Triple flexion in BLE       Significant Labs: All pertinent lab results from the past 24 hours have been reviewed.    Significant Studies: I have reviewed all pertinent imaging results/findings within the past 24 hours.  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 Admitted to [facility] for higher level of care and further management of new BLE DVTs with extension to IVC filter in setting of recent IPHs and positive HIT panel. Hospital course further c/b ZOE, s/p IVC removal and transitioned to Eliquis, PEA arrest during IR procedure for IVC retrieval with ROSC after ~12-15 minutes and myoclonus. CTH with evolving left dorsal pontine and smaller bilateral cerebellar recent to subacute parenchymal hemorrhages; no significant increased size lesion. EEG placed 6/24 with preliminary findings of severe encephalopathy, continuous 1-2hz right LPDs, no discrete seizures. Epilepsy following for assistance in management.    Recommendations:  - Continuous vEEG monitoring  - Recommend AED given interictal findings: IV Levetiracetam 2500 mg x1 followed by 750 mg BID ordered  - Levetiracetam can also aid in management of postanoxic myoclonus  - Propofol off 6/23  - Avoid agents that lower seizure threshold  - Management of infectious/metabolic abnormalities per NCC  - Seizure precautions      Discussed plan of care with NCC team, son and granddaughter at bedside. Will follow, please call with questions.    HIT (heparin-induced thrombocytopenia)  Suspected to have PE resulting in cardiac arrest  - Heme/onc recommends holding Argatroban  - Management per Heme/onc and NCC    Cardiac arrest  PEA arrest during IR procedure for IVC retrieval with ROSC after ~12-15  minutes   - CTH 6/23 with evolving left dorsal pontine and smaller bilateral cerebellar recent to subacute parenchymal hemorrhages; no significant increased size lesion  - Management per 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        VTE Risk Mitigation (From admission, onward)           Ordered     argatroban in sodium chloride 0.9% (conc: 1 mg/mL)  Continuous        Question:  Begin at (in mcg/kg/min):  Answer:  2    06/22/25 1606     IP VTE HIGH RISK PATIENT  Once         06/21/25 0556     Place sequential compression device  Until discontinued         06/21/25 0556     Reason for No Pharmacological VTE Prophylaxis  Once        Question:  Reasons:  Answer:  Active Bleeding    06/21/25 0556                    Thank you for your consult. I will follow-up with patient. Please contact us if you have any additional questions.    Kalyn Alberto PA-C  Neurology-Epilepsy  [facility] - [facility]  Staff: Dr. Callahan

## 2025-06-24 NOTE — EICU
Virtual ICU Quality Rounds    Admit Date: 6/20/2025  Hospital Day: 3    ICU Day: 3d 1h    24H Vital Sign Range:  Temp:  [97.9 °F (36.6 °C)-99.1 °F (37.3 °C)]   Pulse:  []   Resp:  [13-40]   BP: ()/(52-76)   SpO2:  [94 %-100 %]   Arterial Line BP: (106-144)/(54-79)     VICU Surveillance Screening                    LDA reconciliation : Yes

## 2025-06-24 NOTE — SUBJECTIVE & OBJECTIVE
Past Medical History:   Diagnosis Date    Anxiety     Diabetes mellitus, type 2     GERD (gastroesophageal reflux disease)     Hyperlipidemia     Hypertension     Obesity (BMI 30-39.9)        Past Surgical History:   Procedure Laterality Date    HERNIA REPAIR      ULNAR NERVE REPAIR Left        Review of patient's allergies indicates:   Allergen Reactions    Heparin analogues      HIT    Ether for anesthesia Nausea And Vomiting       No current facility-administered medications on file prior to encounter.     Current Outpatient Medications on File Prior to Encounter   Medication Sig    acetaminophen (TYLENOL) 500 MG tablet Take 500 mg by mouth every 6 (six) hours as needed for Pain.    amLODIPine (NORVASC) 10 MG tablet Take 0.5 tablets by mouth once daily.    apixaban (ELIQUIS) 5 mg Tab Take 5 mg by mouth 2 (two) times daily.    clonazePAM (KLONOPIN) 0.5 MG tablet Take 1 tablet by mouth daily as needed.    diclofenac sodium (VOLTAREN) 1 % Gel Apply topically.    efinaconazole (JUBLIA) 10 % Karla Apply topically.    Hydrocortisone 1%-Econazole 1% Topical Cream Apply topically.    levocetirizine (XYZAL) 5 MG tablet Take 1 tablet (5 mg total) by mouth every evening.    LIDOcaine (LIDODERM) 5 % Apply topically.    losartan (COZAAR) 50 MG tablet Take 50 mg by mouth once daily.    megestroL (MEGACE) 40 MG Tab Take 1 tablet (40 mg total) by mouth once daily.    metformin (GLUCOPHAGE) 500 MG tablet Take 500 mg by mouth 2 (two) times daily with meals.    miconazole NITRATE 2 % (MICOTIN) 2 % top powder Apply topically.    omeprazole (PRILOSEC) 20 MG capsule Take 20 mg by mouth 2 (two) times daily.     pravastatin (PRAVACHOL) 40 MG tablet Take 40 mg by mouth once daily.    semaglutide (OZEMPIC SUBQ) Inject into the skin.    semaglutide (OZEMPIC) 2 mg/dose (8 mg/3 mL) PnIj Inject 2mg into the skin once weekly    tadalafil (CIALIS) 20 MG Tab Use one tablet every 36 hours    tamsulosin (FLOMAX) 0.4 mg Cap Take 2 capsules (0.8 mg  total) by mouth once daily.     Continuous Infusions:   argatroban in 0.9 % sod chlor  0-10 mcg/kg/min Intravenous Continuous   Stopped at 06/24/25 0630    lactated ringers   Intravenous Continuous 75 mL/hr at 06/24/25 0802 Rate Verify at 06/24/25 0802       Family History       Problem Relation (Age of Onset)    Cancer Father    Diabetes Sister, Brother    Hyperlipidemia Mother    Hypertension Mother          Tobacco Use    Smoking status: Never     Passive exposure: Never    Smokeless tobacco: Never   Substance and Sexual Activity    Alcohol use: Yes     Alcohol/week: 0.0 standard drinks of alcohol     Comment: social    Drug use: No    Sexual activity: Yes     Partners: Female     Review of Systems  Objective:     Vital Signs (Most Recent):  Temp: 99.1 °F (37.3 °C) (06/24/25 0702)  Pulse: 103 (06/24/25 0809)  Resp: (!) 23 (06/24/25 0809)  BP: (!) 111/57 (06/24/25 0802)  SpO2: 100 % (06/24/25 0809) Vital Signs (24h Range):  Temp:  [97.9 °F (36.6 °C)-99.1 °F (37.3 °C)] 99.1 °F (37.3 °C)  Pulse:  [] 103  Resp:  [13-40] 23  SpO2:  [94 %-100 %] 100 %  BP: ()/(52-76) 111/57  Arterial Line BP: (106-144)/(54-79) 110/54     Weight: 103.9 kg (229 lb)  Body mass index is 31.06 kg/m².     Physical Exam  Constitutional:       General: He is not in acute distress.     Appearance: He is ill-appearing. He is not diaphoretic.      Interventions: He is intubated.   HENT:      Head: Normocephalic and atraumatic.      Comments: EEG in place  Eyes:      General: No scleral icterus.        Right eye: No discharge.         Left eye: No discharge.      Conjunctiva/sclera: Conjunctivae normal.      Pupils: Pupils are equal, round, and reactive to light.   Cardiovascular:      Rate and Rhythm: Normal rate.   Pulmonary:      Effort: Pulmonary effort is normal. No respiratory distress. He is intubated.      Comments: Intubated  Musculoskeletal:         General: No deformity or signs of injury.   Skin:     General: Skin is warm  and dry.   Psychiatric:      Comments: Unable to assess            NEUROLOGICAL EXAMINATION:     CRANIAL NERVES     CN III, IV, VI   Pupils are equal, round, and reactive to light.       Eyes open  No blink to threat OU   MATT OU   Corneal intact OU   Face symmetric   Tongue midline   Myoclonus  No spontaneous movements  Does not follow commands  Triple flexion in BLE       Significant Labs: All pertinent lab results from the past 24 hours have been reviewed.    Significant Studies: I have reviewed all pertinent imaging results/findings within the past 24 hours.

## 2025-06-24 NOTE — ASSESSMENT & PLAN NOTE
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

## 2025-06-24 NOTE — ASSESSMENT & PLAN NOTE
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 Hospital course further c/b ZOE, s/p IVC removal and transitioned to Eliquis, PEA arrest during IR procedure for IVC retrieval with ROSC after ~12-15 minutes and myoclonus. CTH with evolving left dorsal pontine and smaller bilateral cerebellar recent to subacute parenchymal hemorrhages; no significant increased size lesion. EEG placed 6/24 with preliminary findings of severe encephalopathy, continuous 1-2hz right LPDs, no discrete seizures. Epilepsy following for assistance in management.    Recommendations:  - Continuous vEEG monitoring  - Recommend AED given interictal findings: IV Levetiracetam 2500 mg x1 followed by 750 mg BID ordered  - Levetiracetam can also aid in management of postanoxic myoclonus  - Propofol off 6/23  - Avoid agents that lower seizure threshold  - Management of infectious/metabolic abnormalities per NCC  - Seizure precautions      Discussed plan of care with NCC team, son and granddaughter at bedside. Will follow, please call with questions.

## 2025-06-24 NOTE — NURSING
Neck circumference increased from 46cm to 50cm in last 2 hours. Area is soft with no visibile bruising.  SALEEM Cruz notified.

## 2025-06-24 NOTE — PLAN OF CARE
[facility] - Neuro Critical Care  Discharge Reassessment    Primary Care Provider: No, Primary Doctor    Expected Discharge Date: 6/26/2025    Reassessment (most recent)       Discharge Reassessment - 06/24/25 1615          Discharge Reassessment    Assessment Type Discharge Planning Reassessment     Did the patient's condition or plan change since previous assessment? No     Communicated NAMRATA with patient/caregiver Yes     Discharge Plan A --   tbd    Discharge Plan B Other     DME Needed Upon Discharge  none (P)      Transition of Care Barriers None (P)      Why the patient remains in the hospital Requires continued medical care (P)         Post-Acute Status    Discharge Delays None known at this time (P)                    Discharge Plan A and Plan B have been determined by review of patient's clinical status, future medical and therapeutic needs, and coverage/benefits for post-acute care in coordination with multidisciplinary team members.    Leslie Shine MSW, KARLAW  [facility]  Case Management Dept.

## 2025-06-25 PROBLEM — J90 PLEURAL EFFUSION: Status: ACTIVE | Noted: 2025-06-23

## 2025-06-25 PROBLEM — D69.6 THROMBOCYTOPENIA: Status: ACTIVE | Noted: 2025-01-01

## 2025-06-25 PROBLEM — M79.89 RIGHT LEG SWELLING: Status: ACTIVE | Noted: 2025-01-01

## 2025-06-25 PROBLEM — D64.9 ACUTE ON CHRONIC ANEMIA: Status: ACTIVE | Noted: 2025-01-01

## 2025-06-25 NOTE — PLAN OF CARE
[facility] Care Plan    POC reviewed with Franko Smith and family at 0300. Family verbalized understanding. Questions and concerns addressed. No acute events today. Pt progressing toward goals. Will continue to monitor. See below and flowsheets for full assessment and VS info.     -1u RBC given. Argatroban gtt stopped per verbal order. See nursing notes for details.   -EEG button pushed x1 for seizure-like activity. See nursing note for details.   -TMAX 99.9. Tylenol given PRN x1.   -PRN Fentanyl given x2 for vent comfort. See MAR.   -PEEP weaned from 8 to 5   -LR @ 75  -Levo @ 0.04   -Argatroban gtt off   -Plan for possible HD treatment today   -Bath completed, linens changed, catheter care complete       Is this a stroke patient? yes- Stroke booklet reviewed with family and is at bedside.   Care Plan Individualization: decreased stimulation between neuro exams    Neuro:  [place] Coma Scale  Best Eye Response: 2-->(E2) to pain  Best Motor Response: 3-->(M3) flexion to pain  Best Verbal Response: 1-->(V1) none  Vinicio Coma Scale Score: 6  Assessment Qualifiers: Patient intubated, No eye obstruction present  Pupil PERRLA: yes     24 hr Temp:  [98.6 °F (37 °C)-100.2 °F (37.9 °C)]     CV:   Rhythm: normal sinus rhythm  BP goals:   SBP < 160  MAP > 65    Resp:      Vent Mode: A/C  Set Rate: 16 BPM  Oxygen Concentration (%): 40  Vt Set: 450 mL  PEEP/CPAP: 5 cmH20    Plan: wean to extubate    GI/:     Diet/Nutrition Received: NPO, tube feeding  Last Bowel Movement: 06/20/25  Voiding Characteristics: urethral catheter (bladder)    Intake/Output Summary (Last 24 hours) at 6/25/2025 0453  Last data filed at 6/25/2025 0402  Gross per 24 hour   Intake 3488.48 ml   Output 1025 ml   Net 2463.48 ml     Unmeasured Output  Unmeasured Urine Occurrence: 2    Labs/Accuchecks:  Recent Labs   Lab 06/25/25  0308   WBC 7.86   RBC 2.35*   HGB 6.3*   HCT 19.7*         Recent Labs   Lab 06/25/25  0013      K 4.3   CO2 18*   CL  110   BUN 28*   CREATININE 1.8*   ALKPHOS 69   ALT 54*   AST 40   BILITOT 1.9*      Recent Labs   Lab 06/25/25  0229   PROTIME 23.5*   INR 2.3*   APTT 82.7*      Recent Labs   Lab 06/19/25  0642          Electrolytes: No replacement orders  Accuchecks: Q4H    Gtts:   argatroban in 0.9 % sod chlor  0-10 mcg/kg/min Intravenous Continuous   Stopped at 06/25/25 0302    lactated ringers   Intravenous Continuous 75 mL/hr at 06/25/25 0402 Rate Verify at 06/25/25 0402    NORepinephrine bitartrate-D5W  0-0.2 mcg/kg/min Intravenous Continuous 15.8 mL/hr at 06/25/25 0402 0.04 mcg/kg/min at 06/25/25 0402       LDA/Wounds:    Johnathan Risk Assessment  Sensory Perception: 2-->very limited  Moisture: 4-->rarely moist  Activity: 1-->bedfast  Mobility: 1-->completely immobile  Nutrition: 2-->probably inadequate  Friction and Shear: 1-->problem  Johnathan Score: 11  Is your johnathan score 12 or less? yes enrolled in the peach program and heel boots on          Restraints:        [facility]

## 2025-06-25 NOTE — ASSESSMENT & PLAN NOTE
History of  Diagnosed in 2023, s/p radiation therapy, was previously on hormonal therapy (recently discontinued once admitted to [facility] earlier this month)  CT chest/abdomen/pelvis with hepatic and renal lesions, negative for other primary malignancy  Generalized muscle weakness due to cancer and hormonal therapy  Follows regularly with Oncology

## 2025-06-25 NOTE — PLAN OF CARE
06/25/25 1525   Rounds   Attendance Provider;;Charge nurse;Physical therapist   Discharge Plan A Other;Long-term acute care facility (LTAC)  (GOC discussions ongoing)   Why the patient remains in the hospital Requires continued medical care   Transition of Care Barriers None     Pt on vent, GOC discussions ongiong

## 2025-06-25 NOTE — EICU
ONIU Night Rounds Checklist  24H Vital Sign Range:  Temp:  [98.6 °F (37 °C)-100.2 °F (37.9 °C)]   Pulse:  []   Resp:  [18-43]   BP: ()/(53-72)   SpO2:  [100 %]   Arterial Line BP: (110-173)/(46-65)     Video rounds and LDA reconciliation

## 2025-06-25 NOTE — PLAN OF CARE
[facility] Care Plan  POC reviewed with Franko Smith's family at 1000. Family verbalized understanding. Questions and concerns addressed with Dr. Mabry. Pt urine output improving throughout the shift. CT head, chest, abdomen, pelvis and thighs completed. Advancing TF. EEG to come off before MRI tonight and GOC discussion planned for tomorrow morning. Will continue to monitor. See below and flowsheets for full assessment and VS info.             Is this a stroke patient? yes- Stroke booklet reviewed with family and is at bedside.   Care Plan Individualization:     Neuro:  [place] Coma Scale  Best Eye Response: 2-->(E2) to pain  Best Motor Response: 3-->(M3) flexion to pain  Best Verbal Response: 1-->(V1) none  [place] Coma Scale Score: 6  Assessment Qualifiers: Patient intubated  Pupil PERRLA: yes     24 hr Temp:  [97.2 °F (36.2 °C)-100.2 °F (37.9 °C)]     CV:   Rhythm: sinus tachycardia  BP goals:   SBP < 160  MAP > 65    Resp:      Vent Mode: A/C  Set Rate: 16 BPM  Oxygen Concentration (%): 50  Vt Set: 450 mL  PEEP/CPAP: 5 cmH20    Plan: MRI tonight, GOC discussion tomorrow.     GI/:     Diet/Nutrition Received: tube feeding  Last Bowel Movement: 06/20/25  Voiding Characteristics: urethral catheter (bladder)    Intake/Output Summary (Last 24 hours) at 6/25/2025 1715  Last data filed at 6/25/2025 1605  Gross per 24 hour   Intake 2655.87 ml   Output 895 ml   Net 1760.87 ml     Unmeasured Output  Unmeasured Urine Occurrence: 1    Labs/Accuchecks:  Recent Labs   Lab 06/25/25  0903   WBC 7.76   RBC 2.55*   HGB 7.3*   HCT 21.9*   *      Recent Labs   Lab 06/25/25  0903      K 4.3   CO2 19*      BUN 26*   CREATININE 1.7*   ALKPHOS 85   ALT 46*   AST 40   BILITOT 1.7*      Recent Labs   Lab 06/25/25  0229 06/25/25  0903   PROTIME 23.5*  --    INR 2.3*  --    APTT 82.7* 53.5*      Recent Labs   Lab 06/19/25  0642          Electrolytes: Contraindicated  Accuchecks:  Q4H    Gtts:      LDA/Wounds:    Johnathan Risk Assessment  Sensory Perception: 1-->completely limited  Moisture: 4-->rarely moist  Activity: 1-->bedfast  Mobility: 1-->completely immobile  Nutrition: 2-->probably inadequate  Friction and Shear: 2-->potential problem  Johnathan Score: 11    Is your johnathan score 12 or less? yes enrolled in the peach program and heel boots on            Restraints:        [facility]

## 2025-06-25 NOTE — SUBJECTIVE & OBJECTIVE
Interval History:  Please see hospital course above for full details    Review of Systems   Unable to perform ROS: Intubated       Objective:     Vitals:  Temp: 97.2 °F (36.2 °C)  Pulse: 107  Rhythm: sinus tachycardia  BP: (!) 120/57  MAP (mmHg): 82  Resp: (!) 21  SpO2: 98 %  Oxygen Concentration (%): 50  Vent Mode: A/C  Set Rate: 16 BPM  Vt Set: 450 mL  PEEP/CPAP: 5 cmH20  Peak Airway Pressure: 8.7 cmH20  Mean Airway Pressure: 6.7 cmH20  Plateau Pressure: 0 cmH20    Temp  Min: 97.2 °F (36.2 °C)  Max: 99.9 °F (37.7 °C)  Pulse  Min: 93  Max: 120  BP  Min: 98/54  Max: 154/72  MAP (mmHg)  Min: 73  Max: 104  Resp  Min: 19  Max: 48  SpO2  Min: 94 %  Max: 100 %  Oxygen Concentration (%)  Min: 40  Max: 50    06/24 0701 - 06/25 0700  In: 3519.5 [I.V.:2283]  Out: 1045 [Urine:1045]   Unmeasured Output  Unmeasured Urine Occurrence: 1        Physical Exam  General Appearance: Elderly gentleman resting in bed, off sedation, not in acute hemodynamic distress  Mental Status Exam: No response to tactile or noxious stimuli. Not tracking or regarding. Not following commands.   Cranial Nerves: Gaze midline, pupils 3->2 mm and sluggishly reactive b/l. +OCRs, +corneals to saline b/l. Absent gag, +cough. +scleral icterus -> improved from prior  Motor: Diffuse stimulus induced myoclonus, decreased in frequency compared to prior. No w/d to noxious in RUE, extensor posturing in LUE, TF in b/l lower extremities   Sensory: No response to noxious x4 extremities  Coordination: Unable to assess  Vascular: S1/S2 of normal intensity, no S3/S4 appreciated, no murmurs appreciated  Lungs: Coarse breath sounds b/l, diminished at bases b/l   Abdomen: Soft, non-distended, non-tender, BS +  Extremities: worsening anasarca throughout, R thigh noticeably larger in diameter compared to L. Distal pulses dopplerable b/l           Medications:  Continuous Scheduledartificial tears, 1 drop, TID  levETIRAcetam (Keppra) IV (PEDS and ADULTS), 750 mg,  Q12H  mupirocin, , BID  pantoprazole, 40 mg, BID  piperacillin-tazobactam (Zosyn) IV (PEDS and ADULTS) (extended infusion is not appropriate), 4.5 g, Q8H  polyethylene glycol, 17 g, Daily  pravastatin, 40 mg, Daily  senna-docusate, 1 tablet, BID  silodosin, 4 mg, Daily    PRNacetaminophen, 650 mg, Q6H PRN  dextrose 50%, 12.5 g, PRN  fentaNYL, 25 mcg, Q1H PRN  glucagon (human recombinant), 1 mg, PRN  insulin aspart U-100, 1-10 Units, Q4H PRN  ondansetron, 4 mg, Q4H PRN  sodium chloride 0.9%, 10 mL, PRN      Today I personally reviewed pertinent imaging, laboratory results, microbiology results, notably: CT head w/ stable multifocal IPH, subtle edema in L parietal lobe, new from prior. CT C/A/P w/ worsening R pleural effusion, decreased size of retrosternal hematoma, no new hemorrhage present; R thigh edema c/w R femoral vein DVT. cEEG w/ R LPDs w/o discrete seizures, unchanged from prelim read. Blood cx NGTD, MRSA screen negative, sputum cx pending. CBC w/ Hb 6.3 -> 7.3, platelets stably low at 133, no leukocytosis. INR 2.3 overnight, PTT 53.5-87.7 overnight. CMP w/ Na 136, BUN/creatinine downtrending 26/1.7, transaminitis improved, AST/ALT 40/46. Trop downtrending to 563.     Diet  Diet NPO  Diet NPO  Switch TF to Impact Peptide and advance to goal per nutrition recs

## 2025-06-25 NOTE — NURSING
CBC re-draw resulted. H/H 6.3/19.7. No signs of new bleeding. VSS. SALEEM Cruz notified. 1u RBC ordered. Verbal order to re-check CBC 1 hour after transfusion.

## 2025-06-25 NOTE — NURSING
H/H resulted @  6.7/19.9. SALEEM Cruz notified. Verbal order to stop argatroban gtt and re-draw STAT CBC. Argatroban gtt stopped. CBC collected and sent to lab.

## 2025-06-25 NOTE — PROCEDURES
ICU EEG/VIDEO MONITORING REPORT    DATE OF SERVICE: 6/25/25  EEG NUMBER: [id number]  [facility] SERVICE[facility]    METHODOLOGY   Electroencephalographic (EEG) recording is with electrodes placed according to the International 10-20 placement system.  Thirty two (32) channels of digital signal are simultaneously recorded from the scalp and may include EKG, EMG, and/or eye monitors.   Recording band pass was 0.1 to 512 hz.  Digital video recording of the patient is simultaneously recorded with the EEG.  The nursing staff report clinical symptoms and may press an event button when the patient has symptoms of clinical interest to the treating physicians.  EEG and video recording is stored and archived in digital format.  The entire recording is visually reviewed and the times identified by computer analysis as being spikes or seizures are reviewed again.  Activation procedures which include photic stimulation, hyperventilation and instructing patients to perform simple task are done in selected patients.   Compresses spectral analysis (CSA) is also performed on the activity recorded from each individual channel.  This is displayed as a power display of frequencies from 0 to 30 Hz over time.   The CSA analysis is done and displayed continuously.  This is reviewed for asymmetries in power between homologous areas of the scalp and for presence of changes in power which canbe seen when seizures occur.  Sections of suspected abnormalities on the CSA is then compared with the original EEG recording.     Feedjit software was also utilized in the review of this study.  This software suite analyzes the EEG recording in multiple domains.  Coherence and rhythmicity is computed to identify EEG sections which may contain organized seizures.  Each channel undergoes analysis to detect presence of spike and sharp waves which have special and morphological characteristic of epileptic activity.  The routine EEG recording is  converted from spacial into frequency domain.  This is then displayed comparing homologous areas to identify areas of significant asymmetry.  Algorithm to identify non-cortically generated artifact is used to separate eye movement, EMG and other artifact from the EEG.      Recording Times  Start on 6/24/25 at 09:01 for 21 hours 44 mins    This EEG study is performed in the ICU with the patient on the following sedative medications: none    Indication: 78 yo male with history of PE, RLE DVT, HTN, HLD initially presented with IPH in hal/cerebellum, now subsequently diagnosed with HIT.  EEG due to ongoing encephalopathy and to evaluate for seizures.      State of Consciousness:   Lethargy    Background:   The background is continuous and symmetric, with poor reactivity.  The background is composed of 2-5 hz medium to high amplitude theta/delta slowing without a posterior dominant rhythm.      Sleep:   Sleep architecture is not seen, but there is cycling of the background.     Epileptiform Abnormalities  LPDs+R, occurring at a frequency of 1-2 hz throughout the study.  Of note, towards the end of the study the LPDs are somewhat blunted in morphology and more pseudo-periodic in nature.         EKG:   Regular rhythm    Seizures/Events:   No seizures are recorded.    Event button activated at 14:06 and 14:15 due to full body myoclonic jerks.  No correlate is seen.    Impression:   Abnormal study consistent with a severe diffuse encephalopathy and a region of cortical irritability and seizure potential in the right hemisphere. LPDs are seen at 1-2 hz at onset of the study, but throughout the record there is an improvement in the frequency of discharges.  No seizures are recorded.      April Callahan MD  [facility]   Department of Neurology/Epilepsy

## 2025-06-25 NOTE — PROGRESS NOTES
VANCOMYCIN DOSING BY PHARMACY DISCONTINUATION NOTE    Franko Smith is a 79 y.o. male who had been consulted for vancomycin dosing.    The pharmacy consult for vancomycin dosing has been discontinued.     Vancomycin Dosing by Pharmacy Consult will sign-off. Please reconsult if necessary. Thank you for allowing us to participate in this patient's care.       Cha Patel, PharmD, [facility]  Neurocritical Care Clinical Pharmacist  [id number]

## 2025-06-25 NOTE — ASSESSMENT & PLAN NOTE
Intubated for acute hypoxemic respiratory failure 2/2 suspected massive PE on 6/23    - Initial ABG w/ hypercapnia and hypoxemic, improved on repeat   - Wean vent as tolerated for goal SpO2>90% -> currently on minimal vent settings, not a candidate for further vent wean 2/2 mental status   - Goal eucarbia  - S/p tPA for PE, argatroban gtt on hold for supratherapeutic PTT, restart as able   - Daily CXR   - Famotidine while intubated

## 2025-06-25 NOTE — ASSESSMENT & PLAN NOTE
Suspected to have PE resulting in cardiac arrest  - Drop in H/H this morning  - CTH, CT Chest, CT Thigh this morning -> c/f right femoral DVT  - Management per Heme/onc and NCC

## 2025-06-25 NOTE — NURSING
Patient transferred to room 9098 by RN x3 and RT x1. Ventilator, transport monitor, and IV pole in use. No acute events. VSS.   Patient and family oriented to room.

## 2025-06-25 NOTE — ASSESSMENT & PLAN NOTE
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 Hospital course further c/b ZOE, s/p IVC removal and transitioned to Eliquis, PEA arrest during IR procedure for IVC retrieval with ROSC after ~12-15 minutes and myoclonus. CTH with evolving left dorsal pontine and smaller bilateral cerebellar recent to subacute parenchymal hemorrhages; no significant increased size lesion. EEG placed 6/24 with preliminary findings of severe encephalopathy, continuous 1-2hz right LPDs, no discrete seizures. Epilepsy following for assistance in management.    Recommendations:  - Continuous vEEG monitoring, plan to remove overnight for MRI Brain  - Recommend to continue Levetiracetam 750 mg BID   - Levetiracetam can also aid in management of postanoxic myoclonus  - Propofol off 6/23  - Avoid agents that lower seizure threshold  - Management of infectious/metabolic abnormalities per NCC  - Seizure precautions      Discussed plan of care with NCC team. No family at bedside. Will follow EEG, please call with questions.

## 2025-06-25 NOTE — ASSESSMENT & PLAN NOTE
Lab values s/p cardiac arrest consistent w/ acute DIC, likely provoked in setting of HIT and cardiac arrest    - s/p IV tPA 50 mg x1 during arrest, given at 0952  - s/p cryo x1 for persistently low fibrinogen   - Coags improved overnight   - Restart argatroban gtt per protocol   - Monitor PTT q6hrs   - Monitor PT/INR, fibrinogen, CBC qday   - Transfuse PRBCs for Hb<7, platelet goal >20k

## 2025-06-25 NOTE — SIGNIFICANT EVENT
Hgb dropped from 7.7 yesterday to 6.7 this AM. Argatroban gtt held at this time. Repeat Hgb 6.3. 1 PRBC ordered to be transfused. Levo requirements down trending prior to blood transfusion (0.08 at the start of this shift to 0.04 prior to transfusion). No obvious signs of bleeding currently. Post-transfusion CBC pending. Case discussed with NCC attending on call.

## 2025-06-25 NOTE — ASSESSMENT & PLAN NOTE
Noted at rehab facility prior to admission, HIT antibody+, WADE pending    - Briefly on heparin gtt on 6/21, d/c'd s/p receiving notification from rehab facility of positive antibody testing  - Argatroban gtt -> on hold due to supratherapeutic PTT

## 2025-06-25 NOTE — NURSING
Patient with episode of rapid blinking, increased HR (120s), and increased RR lasting ~10-15 seconds. EEG button pushed by RN at bedside. SALEEM Cruz notified.

## 2025-06-25 NOTE — PROGRESS NOTES
[facility]  Neurology-Epilepsy  Progress Note    Patient Name: Franko Smith  MRN: [id number]  Admission Date: 6/20/2025  Hospital Length of Stay: 4 days  Code Status: Full Code   Attending Provider: Anne Mabry MD  Primary Care Physician: Hayley, Primary Doctor   Principal Problem:Encephalopathy    Subjective:     Hospital Course:   EEG 6/24>6/25: severe encephalopathy, 1-2hz right LPDs with some improvement throughout study, no discrete seizures; PBs for myoclonus and rapid blinking associated with tachycardia- no EEG correlate    See EEG reports for details.    Interval History: PBs overnight for episodes of rapid blinking associated with tachycardia-  no EEG correlate. EEG with some improvement since Levetiracetam initiated, no seizures. Drop in H/H this morning. Patient taken for CTH, CT C/A/P, and CT Thigh. No family at bedside on my rounds.    [Current Medications]    [Current Medications]  Current Facility-Administered Medications   Medication Dose Route Frequency Provider Last Rate Last Admin    acetaminophen tablet 650 mg  650 mg Per OG tube Q6H PRN Anne Mabry MD   650 mg at 06/25/25 0000    artificial tears 0.5 % ophthalmic solution 1 drop  1 drop Both Eyes TID Sebastian Beard PA-C   1 drop at 06/25/25 0809    dextrose 50% injection 12.5 g  12.5 g Intravenous PRN Nicko Cruz AGACNP-BC        fentaNYL injection 25 mcg  25 mcg Intravenous Q1H PRN Nicko Cruz AGACNP-BC   25 mcg at 06/25/25 0431    glucagon (human recombinant) injection 1 mg  1 mg Intramuscular PRN Nicko Cruz AGACNP-BC        insulin aspart U-100 pen 1-10 Units  1-10 Units Subcutaneous Q4H PRN Anne Mabry MD   2 Units at 06/25/25 1211    levETIRAcetam injection 750 mg  750 mg Intravenous Q12H Sebastian Beard PA-C   750 mg at 06/25/25 0809    mupirocin 2 % ointment   Nasal BID Anne Mabry MD   Given at 06/25/25 0809    ondansetron injection 4 mg  4 mg Intravenous Q4H PRN  Nicko Cruz, AGACNP-BC   4 mg at 06/22/25 1920    pantoprazole injection 40 mg  40 mg Intravenous BID Angela Almanzar PA-C   40 mg at 06/25/25 1206    piperacillin-tazobactam (ZOSYN) 4.5 g in D5W 100 mL IVPB (MB+)  4.5 g Intravenous Q8H Sebastian Beard PA-C   Stopped at 06/25/25 0849    polyethylene glycol packet 17 g  17 g Per OG tube Daily Anne Mabry MD   17 g at 06/25/25 0809    pravastatin tablet 40 mg  40 mg Per OG tube Daily Anne Mabry MD   40 mg at 06/25/25 0809    senna-docusate 8.6-50 mg per tablet 1 tablet  1 tablet Per OG tube BID Anne Mabry MD   1 tablet at 06/25/25 0809    silodosin capsule 4 mg  4 mg Per OG tube Daily Anne Mabry MD   4 mg at 06/25/25 0808    sodium chloride 0.9% flush 10 mL  10 mL Intravenous PRN Nicko Cruz, AGACNP-BC         Continuous Infusions:    Review of Systems  Objective:     Vital Signs (Most Recent):  Temp: 98.3 °F (36.8 °C) (06/25/25 1105)  Pulse: 106 (06/25/25 1305)  Resp: (!) 27 (06/25/25 1305)  BP: 130/71 (06/25/25 1305)  SpO2: 96 % (06/25/25 1305) Vital Signs (24h Range):  Temp:  [98.3 °F (36.8 °C)-100.2 °F (37.9 °C)] 98.3 °F (36.8 °C)  Pulse:  [] 106  Resp:  [19-48] 27  SpO2:  [94 %-100 %] 96 %  BP: ()/(53-77) 130/71  Arterial Line BP: (115-175)/(46-74) 147/59     Weight: 103.9 kg (229 lb 0.9 oz)  Body mass index is 31.07 kg/m².     Physical Exam  Constitutional:       General: He is not in acute distress.     Appearance: He is ill-appearing. He is not diaphoretic.      Interventions: He is intubated.   HENT:      Head: Normocephalic and atraumatic.      Comments: EEG in place  Eyes:      General: No scleral icterus.        Right eye: No discharge.         Left eye: No discharge.      Conjunctiva/sclera: Conjunctivae normal.      Pupils: Pupils are equal, round, and reactive to light.   Cardiovascular:      Rate and Rhythm: Normal rate.   Pulmonary:      Effort: Pulmonary effort is normal. No respiratory  distress. He is intubated.      Comments: Intubated  Musculoskeletal:         General: No deformity or signs of injury.   Skin:     General: Skin is warm and dry.   Psychiatric:      Comments: Unable to assess            NEUROLOGICAL EXAMINATION:     CRANIAL NERVES     CN III, IV, VI   Pupils are equal, round, and reactive to light.  + spontaneous eye opening  No blink to threat OU   Corneal intact OU   Face symmetric   Tongue midline   No myoclonus  No spontaneous movements  Does not follow commands  Triple flexion in BLE       Significant Labs: All pertinent lab results from the past 24 hours have been reviewed.    Significant Studies: I have reviewed all pertinent imaging results/findings within the past 24 hours.    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 Hospital course further c/b ZOE, s/p IVC removal and transitioned to Eliquis, PEA arrest during IR procedure for IVC retrieval with ROSC after ~12-15 minutes and myoclonus. CTH with evolving left dorsal pontine and smaller bilateral cerebellar recent to subacute parenchymal hemorrhages; no significant increased size lesion. EEG placed 6/24 with preliminary findings of severe encephalopathy, continuous 1-2hz right LPDs, no discrete seizures. Epilepsy following for assistance in management.    Recommendations:  - Continuous vEEG monitoring, plan to remove overnight for MRI Brain  - Recommend to continue Levetiracetam 750 mg BID   - Levetiracetam can also aid in management of postanoxic myoclonus  - Propofol off 6/23  - Avoid agents that lower seizure threshold  - Management of  infectious/metabolic abnormalities per NCC  - Seizure precautions      Discussed plan of care with NCC team. No family at bedside. Will follow EEG, please call with questions.    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 until recent admission at [facility] for 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  - MRI Brain WWO 6/21 early subacute IPHs in left thalamus and left cerebellar hemisphere; numerous chronic hemorrhages and micro hemorrhages throughout bilateral cerebral and cerebellar hemispheres  - CTH with evolving left dorsal pontine and smaller bilateral cerebellar recent to subacute parenchymal hemorrhages; no significant increased size lesion  - Repeat CTH this morning with new edema in left parietal region c/f new early ischemic injury  - Management per NCC        VTE Risk Mitigation (From admission, onward)           Ordered     IP VTE HIGH RISK PATIENT  Once         06/21/25 0556     Place sequential compression device  Until discontinued         06/21/25 0556     Reason for No Pharmacological VTE Prophylaxis  Once        Question:  Reasons:  Answer:  Active Bleeding    06/21/25 0556                    Kalyn Alberto PA-C  Neurology-Epilepsy  [facility]  Staff: Dr. Callahan

## 2025-06-25 NOTE — ASSESSMENT & PLAN NOTE
PEA arrest during IR procedure for IVC retrieval with ROSC after ~12-15 minutes   - CTH 6/23 with evolving left dorsal pontine and smaller bilateral cerebellar recent to subacute parenchymal hemorrhages; no significant increased size lesion  - [facility] planning for MRI Brain overnight  - Management per NCC

## 2025-06-25 NOTE — ASSESSMENT & PLAN NOTE
ATN 2/2 cardiac arrest    - UOP 0.2 cc/kg/hr overnight  - Creatinine uptrending to 2.0   - C/w LR @ 75 cc/hr  - Jack in place for strict I&Os    Monitor renal function closely, pt at risk for progression to ARF

## 2025-06-25 NOTE — ASSESSMENT & PLAN NOTE
S/p PEA arrest on 6/23 during IR procedure for clot removal and IVC filter retrieval  ROSC achieved after ~12-15 minutes, s/p epi x4, IV tPA 50 mg x1  Etiology presumed PE     - Not a candidate for TTM given hypercoaguable state, strict normothermia  - cEEG w/ epileptiform discharges, not time locked to clinical myoclonus -> 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 Prognosis guarded.

## 2025-06-25 NOTE — PROGRESS NOTES
[facility] - Neuro Critical Care  Neurocritical Care  Progress Note    Admit Date: 6/20/2025  Service Date: 06/24/2025  Length of Stay: 3    Subjective:     Chief Complaint: ICH (intracerebral hemorrhage)    History of Present Illness: 78 y/o M with a PMH of prostate CA (diagnosed 2023 s/p radiation), PE (diagnosed in Feb of 2024, was previously on Eliquis until recent IPH), RLE DVT, HTN, and HLD who presents to [facility] ED from [facility] rehab with increased nausea/vomiting. He initially was [facility]/ R sided numbness/tingling and dizziness for two days prior. He initially reported the numbness started on the R side of his face and had extended to his R hand and R side of his abdomen without any RLE numbness/tingling. 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 Per chart review, he had minimal dysmetria in the RUE while at [facility], but otherwise had no focal deficits. MRI with and without contrast was brain negative for any mass. An IVC filter was placed, and his oral AC was not resumed. He was transferred to a rehab facility, and was started on SQH for DVT PPX. He then transferred to [facility] ED for complaints of increased nausea, vomiting, and a headache. On arrival to [facility] ED, he was neuro intact. A CT head revealed similar appearance of previously known lesions, and new, small supratentorial lesions were noted. There was a CT abdomen with IV contrast that was completed for malignancy evaluation which was negative for malignancy but did reveal lower extremity DVT extending up to the femoral vein. Apparent filling defect in the IVC above and surrounding the IVC filter could represent thrombus or mixing of IV contrast. BUE and BLE US pending. MRI with and without also pending. NCC was consulted for IPH. Due tot he need for anticoagulation therapy due to DVT and potential IVC filter thrombosis, he will  be admitted to [facility] for close monitoring and higher level of care.    Hospital Course: 06/22/2025 Pt is HIT positive. Heparin gtt discontinued and started on argatroban yesterday. Therapeutic CTH stable. WADE pending. Plt improved to 90. Hematology consulted and recommending removal of the IVC filter given its prothrombotic risk. IR was consulted for evaluation. ZOE improved, Cr 1.5- discontinue IVF. Stable CTH on therapeutic argatroban. Transition to Eliquis 10mg BID for 6 more days then 5mg BID. Scopolamine patch ordered for nausea.    06/23/2025 Pt transitioned back to argatroban gtt per IR recommendations, PTT therapeutic overnight, went to IR this AM for IVC retrieval and clot removal -> coded in procedure, likely PE from piece of clot breaking off during attempted retrieval. Given IV tPA 50 mg in code, epi x4, ROSC achieved after ~12-15 minutes, please see significant event note for full details. Family updated extensively at bedside s/p code.   06/24/2025 Briefly restarted on argatroban gtt overnight, held 2/2 supratherapeutic PTT. Noted to have stimulus induced myoclonus on exam, hooked up to cEEG -> some epileptiform discharges on R, not time locked to clinical myoclonus. Loaded w/ keppra to prevent seizures given abnormal EEG. Non-responsive off sedation. UOP poor. Oxygenation improved, now on minimal vent settings. Family updated at bedside.     Interval History:  Please see hospital course above for full details    Review of Systems   Unable to perform ROS: Intubated       Objective:     Vitals:  Temp: 99.5 °F (37.5 °C)  Pulse: 110  Rhythm: normal sinus rhythm  BP: 126/69  MAP (mmHg): 91  Resp: (!) 25  SpO2: 100 %  Oxygen Concentration (%): 40  Vent Mode: A/C  Set Rate: 16 BPM  Vt Set: 450 mL  PEEP/CPAP: 8 cmH20  Peak Airway Pressure: 37 cmH20  Mean Airway Pressure: 18 cmH20  Plateau Pressure: 0 cmH20    Temp  Min: 98.1 °F (36.7 °C)  Max: 100.2 °F (37.9 °C)  Pulse  Min: 85  Max: 110  BP  Min: 99/57  Max:  134/62  MAP (mmHg)  Min: 75  Max: 93  Resp  Min: 13  Max: 43  SpO2  Min: 100 %  Max: 100 %  Oxygen Concentration (%)  Min: 40  Max: 40    06/23 0701 - 06/24 0700  In: 3918.2 [I.V.:2420.9]  Out: 465 [Urine:465]   Unmeasured Output  Unmeasured Urine Occurrence: 2        Physical Exam  General Appearance: Elderly gentleman resting in bed, off sedation, not in acute hemodynamic distress  Mental Status Exam: No response to tactile or noxious stimuli. Not tracking or regarding. Not following commands.   Cranial Nerves: Gaze midline, pupils 3->2 mm and sluggishly reactive b/l. +OCRs, +corneals to saline b/l. Absent gag, +cough. +scleral icterus  Motor: Diffuse stimulus induced myoclonus. No w/d to noxious in b/l upper extremities, TF in b/l lower extremities   Sensory: No response to noxious x4 extremities  Coordination: Unable to assess  Vascular: S1/S2 of normal intensity, no S3/S4 appreciated, no murmurs appreciated  Lungs: Coarse breath sounds b/l, diminished at bases b/l   Abdomen: Soft, non-distended, non-tender, BS +         Medications:  Continuousargatroban in 0.9 % sod chlor, Last Rate: 0.5 mcg/kg/min (06/24/25 2102)  lactated ringers, Last Rate: 75 mL/hr at 06/24/25 2102  NORepinephrine bitartrate-D5W, Last Rate: 0.04 mcg/kg/min (06/24/25 2102)    Scheduledartificial tears, 1 drop, TID  famotidine (PF), 20 mg, Daily  levETIRAcetam (Keppra) IV (PEDS and ADULTS), 750 mg, Q12H  mupirocin, , BID  piperacillin-tazobactam (Zosyn) IV (PEDS and ADULTS) (extended infusion is not appropriate), 4.5 g, Q8H  [START ON 6/25/2025] polyethylene glycol, 17 g, Daily  [START ON 6/25/2025] pravastatin, 40 mg, Daily  senna-docusate, 1 tablet, BID  [START ON 6/25/2025] silodosin, 4 mg, Daily    PRNacetaminophen, 650 mg, Q6H PRN  dextrose 50%, 12.5 g, PRN  fentanyl, 25 mcg, Q2H PRN  glucagon (human recombinant), 1 mg, PRN  insulin aspart U-100, 1-10 Units, Q4H PRN  ondansetron, 4 mg, Q4H PRN  sodium chloride 0.9%, 10 mL,  PRN  vancomycin - pharmacy to dose, , pharmacy to manage frequency      Today I personally reviewed pertinent imaging, laboratory results, microbiology results, notably: CT head w/ stable IPH. CT C/A/P w/ rib fracture x1, R lower lung collapse, retrosternal hematoma w/o significant mass effect. TTE w/ EF 60-65%, decreased RV function. ECG w/ prolonged Qtc to 520. Lactate normalized, troponin uptrending, 1114 -> 1195. INR downtrending to 1.6, fibrinogen 157, PTT 71.5 -> >150. ABG 7.371/31.5/95/18.3 on AC/VC+ 16/450/8/40%. Creatinine uptrending to 2.0, UOP 0.2 cc/kg/hr. Mild transaminitis, AST/ALT 78/72. Micro NGTD.    Diet  Diet NPO  Diet NPO  Start trickle TF    Assessment/Plan:     Neuro  * ICH (intracerebral hemorrhage)  Multifocal IPH on 6/9 at OSH, likely in setting of therapeutic AC (on Eliquis) and multifocal cavernomas. 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 Started on AC w/ monitoring in NCC given recent IPH  IPH stable on repeat imaging, NSGY following, no surgical intervention at this time  Pt w/ PEA arrest on 6/23 2/2 massive PE, s/p IV tPA, now in acute DIC -> repeat head CT s/p cardiac arrest stable  Admit to [facility]  Q1h neuro checks, vitals, I/Os  Was previously on Eliquis for DVT/PE  Reversed earlier this month at [facility] and has not been restarted since  Pt is HIT positive. Heparin gtt transitioned to argatroban gtt. Therapeutic CTH stable   Diet NPO  PT/OT/SLP -> on hold due to critical illness  VTE Prophylaxis: mechanical, argatroban gtt on hold due to supratherapeutic PTT  S/p IVC filter placement and subsequent removal on 6/23       Encephalopathy  2/2 cardiac arrest    Numbness and tingling of right upper extremity  See primary problem    Pulmonary  Acute  respiratory failure with hypoxia  Patient with Hypoxic Respiratory failure which is Acute.  he is not on home oxygen. Supplemental oxygen was provided and noted- Vent Mode: A/C  Oxygen Concentration (%):  [] 80  Resp Rate Total:  [21 br/min-69 br/min] 31 br/min  Vt Set:  [450 mL] 450 mL  PEEP/CPAP:  [8 cmH20-10 cmH20] 8 cmH20  Mean Airway Pressure:  [6.6 siB60-65 cmH20] 6.6 cmH20    .   Signs/symptoms of respiratory failure include- cyanosis and cardiac arrest 2/2 hypoxemia. Contributing diagnoses includes - Pulmonary Embolus Labs and images were reviewed. Patient Has recent ABG, which has been reviewed. Will treat underlying causes and adjust management of respiratory failure as follows- s/p IV tPA. Holding argatroban gtt pending stabilization of acute DIC labs.     - RLL lobe collapse on CT chest, concern for aspiration PNA - > on vanc/zosyn, d/c if micro remains negative for additional 24 hrs    - Hold chest PT given abnormal coags, risk of worsening retrosternal hemorrhage   - Now on minimal vent settings     On mechanically assisted ventilation  Intubated for acute hypoxemic respiratory failure 2/2 suspected massive PE on 6/23    - Initial ABG w/ hypercapnia and hypoxemic, improved on repeat   - Wean vent as tolerated for goal SpO2>90% -> currently on minimal vent settings, not a candidate for further vent wean 2/2 mental status   - Goal eucarbia  - S/p tPA for PE, argatroban gtt on hold for supratherapeutic PTT, restart as able   - Daily CXR   - Famotidine while intubated    Cardiac/Vascular  Shock  Hypotensive following PEA arrest on 6/23    - Suspect multifactorial shock, hemorrhagic +/- component of vasoplegia 2/2 cardiac arrest, may have component of cardiogenic 2/2 suspected massive PE  - Hb 6.2 s/p cardiac arrest, 2 u PRBCs -> stable this AM  - LR @ 75 cc/hr  - TTE, ECG, trop reviewed  - Trend lactate q6hrs -> normalized, stop trending  - Wean levophed gtt as tolerated for goal MAP>65, SBP<160  "    No evidence of infection prior to procedure, low clinical suspicion for septic shock at this time    Cardiac arrest  S/p PEA arrest on 6/23 during IR procedure for clot removal and IVC filter retrieval  ROSC achieved after ~12-15 minutes, s/p epi x4, IV tPA 50 mg x1  Etiology presumed PE     - Not a candidate for TTM given hypercoaguable state, strict normothermia  - cEEG w/ epileptiform discharges, not time locked to clinical myoclonus -> 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 Prognosis guarded.     Hyperlipidemia  History of  Statin   Lipid panel reviewed    Essential hypertension  History of  TTE, EKG reviewed  SBP < 160. MAP>65  Holding home meds given pt hypotensive s/p cardiac arrest    Renal/  ZOE (acute kidney injury)  ATN 2/2 cardiac arrest    - UOP 0.2 cc/kg/hr overnight  - Creatinine uptrending to 2.0   - C/w LR @ 75 cc/hr  - Jack in place for strict I&Os    Monitor renal function closely, pt at risk for progression to ARF    ID  Sepsis  This patient does not have evidence of infective focus. RLL aspiration/consolidation without clear evidence of infection  My overall impression is shock due to hemorrhage and vasoplegia, lower concern for septic shock at this time.  Source: See above, ? RLL aspiration PNA, micro NGTD  Antibiotics given-   Antibiotics (72h ago, onward)      Start     Stop Route Frequency Ordered    06/23/25 1715  piperacillin-tazobactam (ZOSYN) 4.5 g in D5W 100 mL IVPB (MB+)         -- IV Every 8 hours (non-standard times) 06/23/25 1605    06/23/25 1715  vancomycin 1,500 mg in 0.9% NaCl 250 mL IVPB (admixture device)         -- IV Once 06/23/25 1614    06/23/25 1704  vancomycin - pharmacy to dose  (vancomycin IVPB (PEDS and ADULTS))        Placed in "And" Linked Group    -- IV pharmacy to manage frequency 06/23/25 1605    06/22/25 1215  " mupirocin 2 % ointment         06/27/25 0859 Nasl 2 times daily 06/22/25 1107          Latest lactate reviewed-  Recent Labs   Lab 06/23/25  1030   LACTATE 3.8*     Organ dysfunction indicated by Acute kidney injury and Acute respiratory failure    Fluid challenge Fluid Not Needed - Patient is not hypotensive and/or lactate is less than 4.0.   Lactate<4, receiving blood in place of fluids    Post- resuscitation assessment No - Post resuscitation assessment not needed       Will Start Pressors- Levophed for MAP of 65  Source control achieved by: vanc/zosyn to cover for possible aspiration event -d/c if micro remains negative x24 hrs     Hematology  HIT (heparin-induced thrombocytopenia)  Noted at rehab facility prior to admission, HIT antibody+, WADE pending    - Briefly on heparin gtt on 6/21, d/c'd s/p receiving notification from rehab facility of positive antibody testing  - Argatroban gtt -> on hold due to supratherapeutic PTT    DIC (disseminated intravascular coagulation)  Lab values s/p cardiac arrest consistent w/ acute DIC, likely provoked in setting of HIT and cardiac arrest    - s/p IV tPA 50 mg x1 during arrest, given at 0952  - s/p cryo x1 for persistently low fibrinogen   - Coags improved overnight   - Restart argatroban gtt per protocol   - Monitor PTT q6hrs   - Monitor PT/INR, fibrinogen, CBC qday   - Transfuse PRBCs for Hb<7, platelet goal >20k    Deep vein thrombosis (DVT) of lower extremity  History of  Was previously on Eliquis for DVT/PE  BLE US at [facility] earlier this month revealed RLE popliteal DVT  CT abdomen pelvis with IV contrast at [facility] ED showed extension of DVT to the femoral vein with potential clot around the IVC filter  Pt is HIT positive.   Hematology consulted and recommending removal of the IVC filter given its prothrombotic risk -> IVC filter removed 6/23, unable to fully remove clot prior to patient coding  WADE pending  S/p IV tPA on 6/23 during cardiac arrest, argatroban gtt  currently on hold due to profoundly abnormal coags  Restart argatroban gtt @ 0.5 once aPTT<80, INR<2, fibrinogen>150  Monitor PTT per protocol     Oncology  Prostate cancer  History of  Diagnosed in 2023, s/p radiation therapy, was previously on hormonal therapy (recently discontinued once admitted to [facility] earlier this month)  CT chest/abdomen/pelvis with hepatic and renal lesions, negative for other primary malignancy  Generalized muscle weakness due to cancer and hormonal therapy  Follows regularly with Oncology    Endocrine  Diabetes mellitus  History of  Hold home meds  SSI PRN  A1C 6.8%    GI  Transaminitis  Shock liver 2/2 cardiac arrest    - Monitor daily  - Minimize hepatotoxic medications    Gastro-esophageal reflux disease without esophagitis  History of  C/w famotidine           The patient is being Prophylaxed for:  Venous Thromboembolism with: Mechanical  Stress Ulcer with: H2B  Ventilator Pneumonia with: chlorhexidine oral care    Activity Orders            Elevate HOB Elevate HOB 30-45 degrees during feeding unless contraindicated starting at 06/24 0858    Diet NPO: NPO starting at 06/23 0001    Turn patient starting at 06/21 0600    Elevate HOB starting at 06/21 0552          Full Code    Uninterrupted Critical Care/Counseling Time (not including procedures): 65 minutes  There is high probability for acute neurological change leading to clinical and possibly life-threatening deterioration requiring highest level of physician preparedness for urgent intervention. Critical care time was spent personally by me on the following activities: development of treatment plan with patient or surrogate and bedside caregivers, discussions with consultants, evaluation of patient's response to treatment, examination of patient, ordering and performing treatments and interventions, ordering and review of laboratory studies, ordering and review of radiographic studies, pulse oximetry, antibiotic titration if  applicable, vasopressor titration if applicable, re-evaluation of patient's condition. I spent greater than 50% of the documented critical care time assessing and making medical decisions for this patient.       Anne Mabry MD  Neurocritical Care  [facility] - Neuro Critical Care

## 2025-06-25 NOTE — SUBJECTIVE & OBJECTIVE
Interval History: PBs overnight for episodes of rapid blinking associated with tachycardia-  no EEG correlate. EEG with some improvement since Levetiracetam initiated, no seizures. Drop in H/H this morning. Patient taken for CTH, CT C/A/P, and CT Thigh. No family at bedside on my rounds.    Current Medications[1]  Continuous Infusions:    Review of Systems  Objective:     Vital Signs (Most Recent):  Temp: 98.3 °F (36.8 °C) (06/25/25 1105)  Pulse: 106 (06/25/25 1305)  Resp: (!) 27 (06/25/25 1305)  BP: 130/71 (06/25/25 1305)  SpO2: 96 % (06/25/25 1305) Vital Signs (24h Range):  Temp:  [98.3 °F (36.8 °C)-100.2 °F (37.9 °C)] 98.3 °F (36.8 °C)  Pulse:  [] 106  Resp:  [19-48] 27  SpO2:  [94 %-100 %] 96 %  BP: ()/(53-77) 130/71  Arterial Line BP: (115-175)/(46-74) 147/59     Weight: 103.9 kg (229 lb 0.9 oz)  Body mass index is 31.07 kg/m².     Physical Exam  Constitutional:       General: He is not in acute distress.     Appearance: He is ill-appearing. He is not diaphoretic.      Interventions: He is intubated.   HENT:      Head: Normocephalic and atraumatic.      Comments: EEG in place  Eyes:      General: No scleral icterus.        Right eye: No discharge.         Left eye: No discharge.      Conjunctiva/sclera: Conjunctivae normal.      Pupils: Pupils are equal, round, and reactive to light.   Cardiovascular:      Rate and Rhythm: Normal rate.   Pulmonary:      Effort: Pulmonary effort is normal. No respiratory distress. He is intubated.      Comments: Intubated  Musculoskeletal:         General: No deformity or signs of injury.   Skin:     General: Skin is warm and dry.   Psychiatric:      Comments: Unable to assess            NEUROLOGICAL EXAMINATION:     CRANIAL NERVES     CN III, IV, VI   Pupils are equal, round, and reactive to light.       Eyes open  No blink to threat OU   MATT OU   Corneal intact OU   Face symmetric   Tongue midline   No myoclonus  No spontaneous movements  Does not follow  commands  Triple flexion in BLE       Significant Labs: All pertinent lab results from the past 24 hours have been reviewed.    Significant Studies: I have reviewed all pertinent imaging results/findings within the past 24 hours.       [1]   Current Facility-Administered Medications   Medication Dose Route Frequency Provider Last Rate Last Admin    acetaminophen tablet 650 mg  650 mg Per OG tube Q6H PRN Anne Mabry MD   650 mg at 06/25/25 0000    artificial tears 0.5 % ophthalmic solution 1 drop  1 drop Both Eyes TID Sebastian Beard PA-C   1 drop at 06/25/25 0809    dextrose 50% injection 12.5 g  12.5 g Intravenous PRN Nicko Cruz AGACNP-BC        fentaNYL injection 25 mcg  25 mcg Intravenous Q1H PRN Nicko Cruz AGACNP-BC   25 mcg at 06/25/25 0431    glucagon (human recombinant) injection 1 mg  1 mg Intramuscular PRN Nicko Cruz AGACNP-BC        insulin aspart U-100 pen 1-10 Units  1-10 Units Subcutaneous Q4H PRN Anne Mabry MD   2 Units at 06/25/25 1211    levETIRAcetam injection 750 mg  750 mg Intravenous Q12H Sebastian Beard PA-C   750 mg at 06/25/25 0809    mupirocin 2 % ointment   Nasal BID Anne Mabry MD   Given at 06/25/25 0809    ondansetron injection 4 mg  4 mg Intravenous Q4H PRN Nicko Cruz AGACNP-BC   4 mg at 06/22/25 1920    pantoprazole injection 40 mg  40 mg Intravenous BID Angela Almanzar PA-C   40 mg at 06/25/25 1206    piperacillin-tazobactam (ZOSYN) 4.5 g in D5W 100 mL IVPB (MB+)  4.5 g Intravenous Q8H Sebastian Beard PA-C   Stopped at 06/25/25 0849    polyethylene glycol packet 17 g  17 g Per OG tube Daily Anne Mabry MD   17 g at 06/25/25 0809    pravastatin tablet 40 mg  40 mg Per OG tube Daily Anne Mabry MD   40 mg at 06/25/25 0809    senna-docusate 8.6-50 mg per tablet 1 tablet  1 tablet Per OG tube BID Anne Mabry MD   1 tablet at 06/25/25 0809    silodosin capsule 4 mg  4 mg Per OG tube Daily Anne Mabry MD    4 mg at 06/25/25 0808    sodium chloride 0.9% flush 10 mL  10 mL Intravenous PRN Nicko Cruz, AGACNP-BC

## 2025-06-25 NOTE — EICU
Virtual ICU Quality Rounds    Admit Date: 6/20/2025  Hospital Day: 4    ICU Day: 3d 23h    24H Vital Sign Range:  Temp:  [98.6 °F (37 °C)-100.2 °F (37.9 °C)]   Pulse:  []   Resp:  [18-43]   BP: ()/(53-72)   SpO2:  [100 %]   Arterial Line BP: (110-173)/(46-65)     VICU Surveillance Screening  Daily review of  line necessity(optional): Urinary catheter in place  Nguyen Indications : Critically ill in the intensive care unit requiring hourly urine output monitoring   LDA reconciliation : Yes  Nguyen best practices : Nurse driven nguyen removal protocol ordered

## 2025-06-25 NOTE — ASSESSMENT & PLAN NOTE
Hypotensive following PEA arrest on 6/23    - Suspect multifactorial shock, hemorrhagic +/- component of vasoplegia 2/2 cardiac arrest, may have component of cardiogenic 2/2 suspected massive PE  - Hb 6.2 s/p cardiac arrest, 2 u PRBCs -> stable this AM  - LR @ 75 cc/hr  - TTE, ECG, trop reviewed  - Trend lactate q6hrs -> normalized, stop trending  - Wean levophed gtt as tolerated for goal MAP>65, SBP<160     No evidence of infection prior to procedure, low clinical suspicion for septic shock at this time

## 2025-06-25 NOTE — ASSESSMENT & PLAN NOTE
Multifocal IPH on 6/9 at OSH, likely in setting of therapeutic AC (on Eliquis) and multifocal cavernomas. 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 Started on AC w/ monitoring in NCC given recent IPH  IPH stable on repeat imaging, NSGY following, no surgical intervention at this time  Pt w/ PEA arrest on 6/23 2/2 massive PE, s/p IV tPA, now in acute DIC -> repeat head CT s/p cardiac arrest stable  Admit to [facility]  Q1h neuro checks, vitals, I/Os  Was previously on Eliquis for DVT/PE  Reversed earlier this month at [facility] and has not been restarted since  Pt is HIT positive. Heparin gtt transitioned to argatroban gtt. Therapeutic CTH stable   Diet NPO  PT/OT/SLP -> on hold due to critical illness  VTE Prophylaxis: mechanical, argatroban gtt on hold due to supratherapeutic PTT  S/p IVC filter placement and subsequent removal on 6/23

## 2025-06-25 NOTE — ASSESSMENT & PLAN NOTE
History of  Was previously on Eliquis for DVT/PE  BLE US at [facility] earlier this month revealed RLE popliteal DVT  CT abdomen pelvis with IV contrast [place] [facility] showed extension of DVT to the femoral vein with potential clot around the IVC filter  Pt is HIT positive.   Hematology consulted and recommending removal of the IVC filter given its prothrombotic risk -> IVC filter removed 6/23, unable to fully remove clot prior to patient coding  WADE pending  S/p IV tPA on 6/23 during cardiac arrest, argatroban gtt currently on hold due to profoundly abnormal coags  Restart argatroban gtt @ 0.5 once aPTT<80, INR<2, fibrinogen>150  Monitor PTT per protocol

## 2025-06-25 NOTE — ASSESSMENT & PLAN NOTE
Hx of DVT/PE previously on Eliquis until recent admission at [facility] for 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  - MRI Brain WWO 6/21 early subacute IPHs in left thalamus and left cerebellar hemisphere; numerous chronic hemorrhages and micro hemorrhages throughout bilateral cerebral and cerebellar hemispheres  - CTH with evolving left dorsal pontine and smaller bilateral cerebellar recent to subacute parenchymal hemorrhages; no significant increased size lesion  - Repeat CTH this morning with new edema in left parietal region c/f new early ischemic injury  - Management per NCC

## 2025-06-25 NOTE — SUBJECTIVE & OBJECTIVE
Interval History:  Please see hospital course above for full details    Review of Systems   Unable to perform ROS: Intubated       Objective:     Vitals:  Temp: 99.5 °F (37.5 °C)  Pulse: 110  Rhythm: normal sinus rhythm  BP: 126/69  MAP (mmHg): 91  Resp: (!) 25  SpO2: 100 %  Oxygen Concentration (%): 40  Vent Mode: A/C  Set Rate: 16 BPM  Vt Set: 450 mL  PEEP/CPAP: 8 cmH20  Peak Airway Pressure: 37 cmH20  Mean Airway Pressure: 18 cmH20  Plateau Pressure: 0 cmH20    Temp  Min: 98.1 °F (36.7 °C)  Max: 100.2 °F (37.9 °C)  Pulse  Min: 85  Max: 110  BP  Min: 99/57  Max: 134/62  MAP (mmHg)  Min: 75  Max: 93  Resp  Min: 13  Max: 43  SpO2  Min: 100 %  Max: 100 %  Oxygen Concentration (%)  Min: 40  Max: 40    06/23 0701 - 06/24 0700  In: 3918.2 [I.V.:2420.9]  Out: 465 [Urine:465]   Unmeasured Output  Unmeasured Urine Occurrence: 2        Physical Exam  General Appearance: Elderly gentleman resting in bed, off sedation, not in acute hemodynamic distress  Mental Status Exam: No response to tactile or noxious stimuli. Not tracking or regarding. Not following commands.   Cranial Nerves: Gaze midline, pupils 3->2 mm and sluggishly reactive b/l. +OCRs, +corneals to saline b/l. Absent gag, +cough. +scleral icterus  Motor: Diffuse stimulus induced myoclonus. No w/d to noxious in b/l upper extremities, TF in b/l lower extremities   Sensory: No response to noxious x4 extremities  Coordination: Unable to assess  Vascular: S1/S2 of normal intensity, no S3/S4 appreciated, no murmurs appreciated  Lungs: Coarse breath sounds b/l, diminished at bases b/l   Abdomen: Soft, non-distended, non-tender, BS +         Medications:  Continuousargatroban in 0.9 % sod chlor, Last Rate: 0.5 mcg/kg/min (06/24/25 2102)  lactated ringers, Last Rate: 75 mL/hr at 06/24/25 2102  NORepinephrine bitartrate-D5W, Last Rate: 0.04 mcg/kg/min (06/24/25 2102)    Scheduledartificial tears, 1 drop, TID  famotidine (PF), 20 mg, Daily  levETIRAcetam (Keppra) IV (PEDS and  ADULTS), 750 mg, Q12H  mupirocin, , BID  piperacillin-tazobactam (Zosyn) IV (PEDS and ADULTS) (extended infusion is not appropriate), 4.5 g, Q8H  [START ON 6/25/2025] polyethylene glycol, 17 g, Daily  [START ON 6/25/2025] pravastatin, 40 mg, Daily  senna-docusate, 1 tablet, BID  [START ON 6/25/2025] silodosin, 4 mg, Daily    PRNacetaminophen, 650 mg, Q6H PRN  dextrose 50%, 12.5 g, PRN  fentanyl, 25 mcg, Q2H PRN  glucagon (human recombinant), 1 mg, PRN  insulin aspart U-100, 1-10 Units, Q4H PRN  ondansetron, 4 mg, Q4H PRN  sodium chloride 0.9%, 10 mL, PRN  vancomycin - pharmacy to dose, , pharmacy to manage frequency      Today I personally reviewed pertinent imaging, laboratory results, microbiology results, notably: CT head w/ stable IPH. CT C/A/P w/ rib fracture x1, R lower lung collapse, retrosternal hematoma w/o significant mass effect. TTE w/ EF 60-65%, decreased RV function. ECG w/ prolonged Qtc to 520. Lactate normalized, troponin uptrending, 1114 -> 1195. INR downtrending to 1.6, fibrinogen 157, PTT 71.5 -> >150. ABG 7.371/31.5/95/18.3 on AC/VC+ 16/450/8/40%. Creatinine uptrending to 2.0, UOP 0.2 cc/kg/hr. Mild transaminitis, AST/ALT 78/72. Micro NGTD.    Diet  Diet NPO  Diet NPO  Start misty TF

## 2025-06-25 NOTE — ASSESSMENT & PLAN NOTE
History of  TTE, EKG reviewed  SBP < 160. MAP>65  Holding home meds given pt hypotensive s/p cardiac arrest

## 2025-06-26 PROBLEM — Z71.89 GOALS OF CARE, COUNSELING/DISCUSSION: Status: ACTIVE | Noted: 2025-06-26

## 2025-06-26 NOTE — ASSESSMENT & PLAN NOTE
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 Admitted to [facility] for higher level of care and further management of new BLE DVTs with extension to IVC filter in setting of recent IPHs and positive HIT panel. Hospital course further c/b ZOE, s/p IVC removal and transitioned to Eliquis, PEA arrest during IR procedure for IVC retrieval with ROSC after ~12-15 minutes and myoclonus. CTH with evolving left dorsal pontine and smaller bilateral cerebellar recent to subacute parenchymal hemorrhages; no significant increased size lesion. EEG placed 6/24 with preliminary findings of severe encephalopathy, continuous 1-2hz right LPDs, no discrete seizures. Epilepsy following for assistance in management.    Recommendations:  - EEG removed for MRI Brain; no rehook  - Recommend to continue Levetiracetam 750 mg BID   - Levetiracetam can also aid in management of postanoxic myoclonus  - Propofol off 6/23  - Avoid agents that lower seizure threshold  - Management of infectious/metabolic abnormalities per NCC  - Seizure precautions      Discussed plan of care with NCC team, brother and granddaughter at bedside. Will sign off, please call with questions.

## 2025-06-26 NOTE — PROGRESS NOTES
[facility] - Neuro Critical Care  Neurocritical Care  Progress Note    Admit Date: 6/20/2025  Service Date: 06/25/2025  Length of Stay: 4    Subjective:     Chief Complaint: ICH (intracerebral hemorrhage)    History of Present Illness: 78 y/o M with a PMH of prostate CA (diagnosed 2023 s/p radiation), PE (diagnosed in Feb of 2024, was previously on Eliquis until recent IPH), RLE DVT, HTN, and HLD who presents to [facility] ED from [facility] with increased nausea/vomiting. He initially was admitted to [facility] w/ R sided numbness/tingling and dizziness for two days prior. He initially reported the numbness started on the R side of his face and had extended to his R hand and R side of his abdomen without any RLE numbness/tingling. 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 Per chart review, he had minimal dysmetria in the RUE while at [facility], but otherwise had no focal deficits. MRI with and without contrast was brain negative for any mass. An IVC filter was placed, and his oral AC was not resumed. He was transferred to a rehab facility, and was started on SQH for DVT PPX. He then transferred to [facility] ED for complaints of increased nausea, vomiting, and a headache. On arrival to [facility] ED, he was neuro intact. A CT head revealed similar appearance of previously known lesions, and new, small supratentorial lesions were noted. There was a CT abdomen with IV contrast that was completed for malignancy evaluation which was negative for malignancy but did reveal lower extremity DVT extending up to the femoral vein. Apparent filling defect in the IVC above and surrounding the IVC filter could represent thrombus or mixing of IV contrast. BUE and BLE US pending. MRI with and without also pending. NCC was consulted for IPH. Due tot he need for anticoagulation therapy due to DVT and potential IVC filter thrombosis, he will  be admitted to [facility] for close monitoring and higher level of care.    Hospital Course: 06/22/2025 Pt is HIT positive. Heparin gtt discontinued and started on argatroban yesterday. Therapeutic CTH stable. WADE pending. Plt improved to 90. Hematology consulted and recommending removal of the IVC filter given its prothrombotic risk. IR was consulted for evaluation. ZOE improved, Cr 1.5- discontinue IVF. Stable CTH on therapeutic argatroban. Transition to Eliquis 10mg BID for 6 more days then 5mg BID. Scopolamine patch ordered for nausea.    06/23/2025 Pt transitioned back to argatroban gtt per IR recommendations, PTT therapeutic overnight, went to IR this AM for IVC retrieval and clot removal -> coded in procedure, likely PE from piece of clot breaking off during attempted retrieval. Given IV tPA 50 mg in code, epi x4, ROSC achieved after ~12-15 minutes, please see significant event note for full details. Family updated extensively at bedside s/p code.   06/24/2025 Briefly restarted on argatroban gtt overnight, held 2/2 supratherapeutic PTT. Noted to have stimulus induced myoclonus on exam, hooked up to cEEG -> some epileptiform discharges on R, not time locked to clinical myoclonus. Loaded w/ keppra to prevent seizures given abnormal EEG. Non-responsive off sedation. UOP poor. Oxygenation improved, now on minimal vent settings. Family updated at bedside.   06/25/2025 Supratherapeutic PTT again overnight, CBC w/ drop in Hb to 6.3, given 1 u PRBC. CT C/A/P/thighs obtained to r/o internal bleeding, notably given R thigh markedly larger than L thigh on exam -> CT w/ worsening R pleural effusion, no active hemorrhage. IVFs held, UOP/creatinine improving. Started on pantoprazole 40 mg IV BID for ? Slow GIB, holding on GI consult given pt hemodynamically stable off vasopressor support, pending neuroprognostication s/p cardiac arrest. Holding argatroban gtt given persistent transfusion requirements.     Interval History:   Please see hospital course above for full details    Review of Systems   Unable to perform ROS: Intubated       Objective:     Vitals:  Temp: 97.2 °F (36.2 °C)  Pulse: 107  Rhythm: sinus tachycardia  BP: (!) 120/57  MAP (mmHg): 82  Resp: (!) 21  SpO2: 98 %  Oxygen Concentration (%): 50  Vent Mode: A/C  Set Rate: 16 BPM  Vt Set: 450 mL  PEEP/CPAP: 5 cmH20  Peak Airway Pressure: 8.7 cmH20  Mean Airway Pressure: 6.7 cmH20  Plateau Pressure: 0 cmH20    Temp  Min: 97.2 °F (36.2 °C)  Max: 99.9 °F (37.7 °C)  Pulse  Min: 93  Max: 120  BP  Min: 98/54  Max: 154/72  MAP (mmHg)  Min: 73  Max: 104  Resp  Min: 19  Max: 48  SpO2  Min: 94 %  Max: 100 %  Oxygen Concentration (%)  Min: 40  Max: 50    06/24 0701 - 06/25 0700  In: 3519.5 [I.V.:2283]  Out: 1045 [Urine:1045]   Unmeasured Output  Unmeasured Urine Occurrence: 1        Physical Exam  General Appearance: Elderly gentleman resting in bed, off sedation, not in acute hemodynamic distress  Mental Status Exam: No response to tactile or noxious stimuli. Not tracking or regarding. Not following commands.   Cranial Nerves: Gaze midline, pupils 3->2 mm and sluggishly reactive b/l. +OCRs, +corneals to saline b/l. Absent gag, +cough. +scleral icterus -> improved from prior  Motor: Diffuse stimulus induced myoclonus, decreased in frequency compared to prior. No w/d to noxious in RUE, extensor posturing in LUE, TF in b/l lower extremities   Sensory: No response to noxious x4 extremities  Coordination: Unable to assess  Vascular: S1/S2 of normal intensity, no S3/S4 appreciated, no murmurs appreciated  Lungs: Coarse breath sounds b/l, diminished at bases b/l   Abdomen: Soft, non-distended, non-tender, BS +  Extremities: worsening anasarca throughout, R thigh noticeably larger in diameter compared to L. Distal pulses dopplerable b/l           Medications:  Continuous Scheduledartificial tears, 1 drop, TID  levETIRAcetam (Keppra) IV (PEDS and ADULTS), 750 mg, Q12H  mupirocin, ,  BID  pantoprazole, 40 mg, BID  piperacillin-tazobactam (Zosyn) IV (PEDS and ADULTS) (extended infusion is not appropriate), 4.5 g, Q8H  polyethylene glycol, 17 g, Daily  pravastatin, 40 mg, Daily  senna-docusate, 1 tablet, BID  silodosin, 4 mg, Daily    PRNacetaminophen, 650 mg, Q6H PRN  dextrose 50%, 12.5 g, PRN  fentaNYL, 25 mcg, Q1H PRN  glucagon (human recombinant), 1 mg, PRN  insulin aspart U-100, 1-10 Units, Q4H PRN  ondansetron, 4 mg, Q4H PRN  sodium chloride 0.9%, 10 mL, PRN      Today I personally reviewed pertinent imaging, laboratory results, microbiology results, notably: CT head w/ stable multifocal IPH, subtle edema in L parietal lobe, new from prior. CT C/A/P w/ worsening R pleural effusion, decreased size of retrosternal hematoma, no new hemorrhage present; R thigh edema c/w R femoral vein DVT. cEEG w/ R LPDs w/o discrete seizures, unchanged from prelim read. Blood cx NGTD, MRSA screen negative, sputum cx pending. CBC w/ Hb 6.3 -> 7.3, platelets stably low at 133, no leukocytosis. INR 2.3 overnight, PTT 53.5-87.7 overnight. CMP w/ Na 136, BUN/creatinine downtrending 26/1.7, transaminitis improved, AST/ALT 40/46. Trop downtrending to 563.     Diet  Diet NPO  Diet NPO  Switch TF to Impact Peptide and advance to goal per nutrition recs     Assessment/Plan:     Neuro  * ICH (intracerebral hemorrhage)  Multifocal IPH on 6/9 at OSH, likely in setting of therapeutic AC (on Eliquis) and multifocal cavernomas. 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 Started on AC w/ monitoring in NCC given recent IPH  IPH stable on repeat imaging, NSGY following, no surgical intervention at this time  Pt w/ PEA arrest on 6/23 2/2 massive PE, s/p IV tPA, now in acute DIC -> repeat  head CT s/p cardiac arrest stable  Admit to [facility]  Q1h neuro checks, vitals, I/Os  Was previously on Eliquis for DVT/PE  Reversed earlier this month at [facility] and has not been restarted since  Pt is HIT positive. Heparin gtt transitioned to argatroban gtt. Therapeutic CTH stable   PT/OT/SLP -> on hold due to critical illness  VTE Prophylaxis: mechanical, argatroban gtt on hold due concern for active bleed  S/p IVC filter placement and subsequent removal on 6/23       Encephalopathy  2/2 cardiac arrest    Numbness and tingling of right upper extremity  See primary problem    Pulmonary  Pleural effusion  Noted on post-cardiac arrest imaging    - Worsening on repeat imaging, d/c IVFs  - Hold on diuresis given ZOE, pt on minimal vent settings; will give lasix PRN if pt w/ increase in oxygen requirements  - Sputum cx pending, MRSA screen negative -> d/c vanc, c/w zosyn pending respiratory cx     Acute respiratory failure with hypoxia  Patient with Hypoxic Respiratory failure which is Acute.  he is not on home oxygen. Supplemental oxygen was provided and noted- Vent Mode: A/C  Oxygen Concentration (%):  [40-50] 50  Resp Rate Total:  [19 br/min-31 br/min] 27 br/min  Vt Set:  [450 mL] 450 mL  PEEP/CPAP:  [5 cmH20-8 cmH20] 5 cmH20  Mean Airway Pressure:  [6.7 ulV42-16 cmH20] 6.7 cmH20    .   Signs/symptoms of respiratory failure include- cyanosis and cardiac arrest 2/2 hypoxemia. Contributing diagnoses includes - Pulmonary Embolus Labs and images were reviewed. Patient Has recent ABG, which has been reviewed. Will treat underlying causes and adjust management of respiratory failure as follows- s/p IV tPA. Holding argatroban gtt pending stabilization of CBC.     - RLL lobe collapse on CT chest, concern for aspiration PNA - > on zosyn, d/c if sputum cx negative  - Hold chest PT given abnormal coags, risk of worsening retrosternal hemorrhage   - Now on minimal vent settings     On mechanically assisted ventilation  Intubated for  acute hypoxemic respiratory failure 2/2 suspected massive PE on 6/23    - Initial ABG w/ hypercapnia and hypoxemic, improved on repeat   - Wean vent as tolerated for goal SpO2>90% -> currently on minimal vent settings, not a candidate for further vent wean 2/2 mental status   - Goal eucarbia  - S/p tPA for PE, argatroban gtt on hold for concern for active bleeding  - Daily CXR     Cardiac/Vascular  Shock  Hypotensive following PEA arrest on 6/23    - Suspect multifactorial shock, hemorrhagic +/- component of vasoplegia 2/2 cardiac arrest, may have component of cardiogenic 2/2 suspected massive PE  - TTE, ECG, trop reviewed  - Trend lactate q6hrs -> normalized, stop trending  - Wean levophed gtt as tolerated for goal MAP>65, SBP<160 -> off levophed/vasopressor support at this time    No evidence of infection prior to procedure, low clinical suspicion for septic shock at this time    Cardiac arrest  S/p PEA arrest on 6/23 during IR procedure for clot removal and IVC filter retrieval  ROSC achieved after ~12-15 minutes, s/p epi x4, IV tPA 50 mg x1  Etiology presumed PE     - Not a candidate for TTM given hypercoaguable state, strict normothermia  - cEEG w/ epileptiform discharges, not time locked to clinical myoclonus -> c/w post-anoxic myoclonus w/o status    - Does have continuous background, however poor reactivity; equivocal in terms of neuroprognostication  - MRI brain w/o contrast to assess for anoxic brain injury x72 hrs (6/26)  - Shock liver + ATN, monitor    Family updated extensively at bedside. Prognosis guarded. Plan for family meeting 6/26    Hyperlipidemia  History of  Statin on hold due to transaminitis  Lipid panel reviewed    Essential hypertension  History of  TTE, EKG reviewed  SBP < 160. 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    - Trending daily       HIT (heparin-induced thrombocytopenia)  Noted at rehab facility prior to admission, HIT antibody+, WADE pending    - Briefly on heparin gtt on 6/21, d/c'd s/p receiving notification from rehab facility of positive antibody testing  - Argatroban gtt -> on hold due to concern for active GIB    DIC (disseminated intravascular coagulation)  Lab values s/p cardiac arrest consistent w/ acute DIC, likely provoked in setting of HIT and cardiac arrest    - s/p IV tPA 50 mg x1 during arrest, given at 0952  - s/p cryo x1 for persistently low fibrinogen   - Coags remained variable overnight, very sensitive to restarting anticoagulation  - Monitor PT/INR, fibrinogen, CBC qday   - Transfuse PRBCs for Hb<7, platelet goal >20k    Deep vein thrombosis (DVT) of lower extremity  History of  Was previously on Eliquis for DVT/PE  BLE US at [facility] earlier this month revealed RLE popliteal DVT  CT abdomen pelvis with IV contrast at [facility] showed extension of DVT to the femoral vein with potential clot around the IVC filter  Pt is HIT positive.   Hematology consulted and recommending removal of the IVC filter given its prothrombotic risk -> IVC filter removed 6/23, unable to fully remove clot prior to patient coding  WADE pending  Suspect DVT as etiology of RLE swelling, NTD at this time. At risk of post-thrombotic syndrome if survives current hospitalization  S/p IV tPA on 6/23 during cardiac arrest, argatroban gtt currently on hold due to recurrent transfusion requirement/concern for GIB   Monitor coags daily     Oncology  Acute on chronic anemia  Anemia of chronic illness present at admission    Worsening anemia s/p cardiac arrest, s/p 2 u PRBCs on 6/23, 1  u cryo 6/23, 1 u PRBCs 6/25    - CT C/A/P x2 and CT thigh w/ small retrosternal hematoma, no evidence of active hemorrhage   - ? GIB vs consumptive anemia given persistent transfusion requirement  - Start protonix IV BID  - Monitor CBC q8hrs, goal Hb>7  - Hold argatroban gtt today pending stabilization of coags and Hb    Pt currently hemodynamically stable off vasopressor support and with unclear neuroprognostication. Hold on GI consult/possible EGD pending neuroprognostication w/ planned family meeting on 6/26 to clarify C    Prostate cancer  History of  Diagnosed in 2023, s/p radiation therapy, was previously on hormonal therapy (recently discontinued once admitted to [facility] earlier this month)  CT chest/abdomen/pelvis with hepatic and renal lesions, negative for other primary malignancy  Generalized muscle weakness due to cancer and hormonal therapy  Follows regularly with Oncology    Endocrine  Diabetes mellitus  History of  Hold home meds  SSI PRN  A1C 6.8%    GI  Transaminitis  Shock liver 2/2 cardiac arrest    - Downtrending  - Monitor daily  - Minimize hepatotoxic medications    Orthopedic  Right leg swelling  Noted on exam    - CT thigh w/o evidence of hematoma formation, does have R>L soft tissue edema  - Suspect RLE swelling 2/2 known R femoral and iliac DVT  - Monitor distal pulses -> currently dopplerable   - Management of DVT as able           The patient is being Prophylaxed for:  Venous Thromboembolism with: Mechanical  Stress Ulcer with: PPI  Ventilator Pneumonia with: chlorhexidine oral care    Activity Orders            Elevate HOB Elevate HOB 30-45 degrees during feeding unless contraindicated starting at 06/24 0858    Diet NPO: NPO starting at 06/23 0001    Turn patient starting at 06/21 0600    Elevate HOB starting at 06/21 0552          Full Code    Uninterrupted Critical Care/Counseling Time (not including procedures): 65 minutes  There is high probability for acute neurological change leading  to clinical and possibly life-threatening deterioration requiring highest level of physician preparedness for urgent intervention. Critical care time was spent personally by me on the following activities: development of treatment plan with patient or surrogate and bedside caregivers, discussions with consultants, evaluation of patient's response to treatment, examination of patient, ordering and performing treatments and interventions, ordering and review of laboratory studies, ordering and review of radiographic studies, pulse oximetry, antibiotic titration if applicable, vasopressor titration if applicable, re-evaluation of patient's condition. I spent greater than 50% of the documented critical care time assessing and making medical decisions for this patient.       Anne Mabry MD  Neurocritical Care  [facility] - Neuro Critical Care

## 2025-06-26 NOTE — PROCEDURES
ICU EEG/VIDEO MONITORING REPORT    DATE OF SERVICE: 6/26/25  EEG NUMBER: [id number]  LOCATION OF SERVICE: ICU ([facility])    METHODOLOGY   Electroencephalographic (EEG) recording is with electrodes placed according to the International 10-20 placement system.  Thirty two (32) channels of digital signal are simultaneously recorded from the scalp and may include EKG, EMG, and/or eye monitors.   Recording band pass was 0.1 to 512 hz.  Digital video recording of the patient is simultaneously recorded with the EEG.  The nursing staff report clinical symptoms and may press an event button when the patient has symptoms of clinical interest to the treating physicians.  EEG and video recording is stored and archived in digital format.  The entire recording is visually reviewed and the times identified by computer analysis as being spikes or seizures are reviewed again.  Activation procedures which include photic stimulation, hyperventilation and instructing patients to perform simple task are done in selected patients.   Compresses spectral analysis (CSA) is also performed on the activity recorded from each individual channel.  This is displayed as a power display of frequencies from 0 to 30 Hz over time.   The CSA analysis is done and displayed continuously.  This is reviewed for asymmetries in power between homologous areas of the scalp and for presence of changes in power which canbe seen when seizures occur.  Sections of suspected abnormalities on the CSA is then compared with the original EEG recording.     [employer] software was also utilized in the review of this study.  This software suite analyzes the EEG recording in multiple domains.  Coherence and rhythmicity is computed to identify EEG sections which may contain organized seizures.  Each channel undergoes analysis to detect presence of spike and sharp waves which have special and morphological characteristic of epileptic activity.  The routine EEG recording is  converted from spacial into frequency domain.  This is then displayed comparing homologous areas to identify areas of significant asymmetry.  Algorithm to identify non-cortically generated artifact is used to separate eye movement, EMG and other artifact from the EEG.      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  EEG due to ongoing encephalopathy and to evaluate for seizures.      State of Consciousness:   Lethargy    Background:   The background is continuous and symmetric, with poor reactivity.  The background is composed of 2-5 hz medium to high amplitude theta/delta slowing without a posterior dominant rhythm.      Sleep:   Sleep architecture is not seen, but there is cycling of the background.     Epileptiform Abnormalities  Occasional right hemispheric sharps    EKG:   Regular rhythm    Seizures/Events:   No seizures are recorded.    Event button activated at 14:19 for rapid eye blinking, no EEG correlate.    Impression:   Abnormal study consistent with a severe diffuse encephalopathy and a region of cortical irritability and seizure potential in the right hemisphere.  Of note, there is an improvement in the frequency of epileptiform discharges from periodic to occasional, indicating an improvement in the degree of epileptogenicity.       April Callahan MD  [facility]   Department of Neurology/Epilepsy

## 2025-06-26 NOTE — ASSESSMENT & PLAN NOTE
Lab values s/p cardiac arrest consistent w/ acute DIC, likely provoked in setting of HIT and cardiac arrest    - s/p IV tPA 50 mg x1 during arrest, given at 0952  - s/p cryo x1 for persistently low fibrinogen   - Coags remained variable overnight, very sensitive to restarting anticoagulation  - Monitor PT/INR, fibrinogen, CBC qday   - Transfuse PRBCs for Hb<7, platelet goal >20k

## 2025-06-26 NOTE — ASSESSMENT & PLAN NOTE
Noted on exam    - CT thigh w/o evidence of hematoma formation, does have R>L soft tissue edema  - Suspect RLE swelling 2/2 known R femoral and iliac DVT  - Monitor distal pulses -> currently dopplerable   - Management of DVT as able

## 2025-06-26 NOTE — PLAN OF CARE
06/26/25 1453   Rounds   Attendance Provider;   Discharge Plan A Comfort care/withdrawal   Why the patient remains in the hospital Requires continued medical care   Transition of Care Barriers None     Pt and MD had a goals of care meeting today, Pt's family awaiting more family to bedside. Possible comfort care starting tomorrow.

## 2025-06-26 NOTE — EICU
Virtual ICU Quality Rounds    Admit Date: 6/20/2025  Hospital Day: 5    ICU Day: 5d 1h    24H Vital Sign Range:  Temp:  [97.2 °F (36.2 °C)-100.1 °F (37.8 °C)]   Pulse:  [102-118]   Resp:  [19-36]   BP: (107-144)/(52-71)   SpO2:  [94 %-100 %]   Arterial Line BP: (100-175)/(50-74)     VICU Surveillance Screening  Daily review of  line necessity(optional): Urinary catheter in place  Nguyen Indications : Critically ill in the intensive care unit requiring hourly urine output monitoring   LDA reconciliation : Yes  Nguyen best practices : Nurse driven nguyen removal protocol ordered

## 2025-06-26 NOTE — ASSESSMENT & PLAN NOTE
PEA arrest during IR procedure for IVC retrieval with ROSC after ~12-15 minutes   - CTH 6/23 with evolving left dorsal pontine and smaller bilateral cerebellar recent to subacute parenchymal hemorrhages; no significant increased size lesion  - MRI Brain this morning read as new areas of mildly expansile restricted diffusion and T2 weighted signal abnormality probably centered within the cortex of the left greater than right cerebral hemispheres; new patchy area of restricted diffusion and T2 weighted signal abnormality within the right cerebellum would be in keeping with recent ischemia  - NCC planning for GOC meeting with family today  - Management per NCC

## 2025-06-26 NOTE — ASSESSMENT & PLAN NOTE
History of  Was previously on Eliquis for DVT/PE  BLE US at [facility] earlier this month revealed RLE popliteal DVT  CT abdomen pelvis with IV contrast at [facility] ED showed extension of DVT to the femoral vein with potential clot around the IVC filter  Pt is HIT positive.   Hematology consulted and recommending removal of the IVC filter given its prothrombotic risk -> IVC filter removed 6/23, unable to fully remove clot prior to patient coding  WADE pending  Suspect DVT as etiology of RLE swelling, NTD at this time. At risk of post-thrombotic syndrome if survives current hospitalization  S/p IV tPA on 6/23 during cardiac arrest, argatroban gtt currently on hold due to recurrent transfusion requirement/concern for GIB   Monitor coags daily

## 2025-06-26 NOTE — ASSESSMENT & PLAN NOTE
Hypotensive following PEA arrest on 6/23    - Suspect multifactorial shock, hemorrhagic +/- component of vasoplegia 2/2 cardiac arrest, may have component of cardiogenic 2/2 suspected massive PE  - TTE, ECG, trop reviewed  - Trend lactate q6hrs -> normalized, stop trending  - Wean levophed gtt as tolerated for goal MAP>65, SBP<160 -> off levophed/vasopressor support at this time    No evidence of infection prior to procedure, low clinical suspicion for septic shock at this time

## 2025-06-26 NOTE — ASSESSMENT & PLAN NOTE
Anemia of chronic illness present at admission    Worsening anemia s/p cardiac arrest, s/p 2 u PRBCs on 6/23, 1 u cryo 6/23, 1 u PRBCs 6/25    - CT C/A/P x2 and CT thigh w/ small retrosternal hematoma, no evidence of active hemorrhage   - ? GIB vs consumptive anemia given persistent transfusion requirement  - Start protonix IV BID  - Monitor CBC q8hrs, goal Hb>7  - Hold argatroban gtt today pending stabilization of coags and Hb    Pt currently hemodynamically stable off vasopressor support and with unclear neuroprognostication. Hold on GI consult/possible EGD pending neuroprognostication w/ planned family meeting on 6/26 to clarify GOC

## 2025-06-26 NOTE — PROGRESS NOTES
[facility] Neuro Critical Care  Neurology-Epilepsy  Progress Note    Patient Name: Franko Smith  MRN: [id number]  Admission Date: 6/20/2025  Hospital Length of Stay: 5 days  Code Status: Full Code   Attending Provider: Anne Mabry MD  Primary Care Physician: No, Primary Doctor   Principal Problem:Encephalopathy    Subjective:     Hospital Course:   EEG 6/24>6/25: severe encephalopathy, 1-2hz right LPDs with some improvement throughout study, no discrete seizures; PBs for myoclonus with no EEG correlate  EEG 6/25>6/26: occasional right sharps, overall improved, no seizures; PB @1419 for rapid eye blinking and tachycardia with no EEG correlate    See EEG reports for details.    Interval History: EEG removed for MRI overnight. No seizures on EEG. Updated brother and granddaughter at bedside, answered all questions. NCC planning for GOC later today.    Current Medications[1]  Continuous Infusions:    Review of Systems  Objective:     Vital Signs (Most Recent):  Temp: 98.7 °F (37.1 °C) (06/26/25 0401)  Pulse: 104 (06/26/25 1300)  Resp: (!) 21 (06/26/25 1300)  BP: (!) 122/57 (06/26/25 1300)  SpO2: 98 % (06/26/25 1300) Vital Signs (24h Range):  Temp:  [97.2 °F (36.2 °C)-100.1 °F (37.8 °C)] 98.7 °F (37.1 °C)  Pulse:  [102-118] 104  Resp:  [19-32] 21  SpO2:  [94 %-100 %] 98 %  BP: (107-141)/(52-65) 122/57  Arterial Line BP: (100-151)/(50-63) 123/61     Weight: 103.9 kg (229 lb 0.9 oz)  Body mass index is 31.07 kg/m².     Physical Exam  Constitutional:       General: He is not in acute distress.     Appearance: He is ill-appearing. He is not diaphoretic.      Interventions: He is intubated.   HENT:      Head: Normocephalic and atraumatic.      Comments: EEG in place  Eyes:      General: No scleral icterus.        Right eye: No discharge.         Left eye: No discharge.      Conjunctiva/sclera: Conjunctivae normal.      Pupils: Pupils are equal, round, and reactive to light.   Cardiovascular:      Rate and Rhythm:  Normal rate.   Pulmonary:      Effort: Pulmonary effort is normal. No respiratory distress. He is intubated.      Comments: Intubated  Musculoskeletal:         General: No deformity or signs of injury.   Skin:     General: Skin is warm and dry.   Psychiatric:      Comments: Unable to assess            NEUROLOGICAL EXAMINATION:     CRANIAL NERVES     CN III, IV, VI   Pupils are equal, round, and reactive to light.       Comatose  MATT OU   Corneal intact OU   No spontaneous eye opening  Face symmetric   Tongue midline   No myoclonus  No spontaneous movements  Does not follow commands  Triple flexion in BLE       Significant Labs: All pertinent lab results from the past 24 hours have been reviewed.    Significant Studies: I have reviewed all pertinent imaging results/findings within the past 24 hours.    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 Admitted to [facility] for higher level of care and further management of new BLE DVTs with extension to IVC filter in setting of recent IPHs and positive HIT panel. Hospital course further c/b ZOE, s/p IVC removal and transitioned to Eliquis, PEA arrest during IR procedure for IVC retrieval with ROSC after ~12-15 minutes and myoclonus. CTH with evolving left dorsal pontine and smaller bilateral cerebellar recent to subacute parenchymal hemorrhages; no significant increased size lesion. EEG placed 6/24 with preliminary findings of severe encephalopathy, continuous 1-2hz right LPDs, no discrete seizures. Epilepsy following for assistance in management.    Recommendations:  - EEG removed for MRI Brain; no rehook  - Recommend to continue Levetiracetam 750 mg BID   - Levetiracetam can also aid in management of postanoxic myoclonus  - Propofol off 6/23  - Avoid agents  that lower seizure threshold  - Management of infectious/metabolic abnormalities per NCC  - Seizure precautions      Discussed plan of care with NCC team, brother and granddaughter at bedside. Will sign off, please call with questions.    HIT (heparin-induced thrombocytopenia)  Suspected to have PE resulting in cardiac arrest  - Drop in H/H 6/25  - CTH, CT Chest, CT Thigh 6/25 -> c/f right femoral DVT  - Management per Heme/onc and NCC    Cardiac arrest  PEA arrest during IR procedure for IVC retrieval with ROSC after ~12-15 minutes   - CTH 6/23 with evolving left dorsal pontine and smaller bilateral cerebellar recent to subacute parenchymal hemorrhages; no significant increased size lesion  - MRI Brain this morning read as new areas of mildly expansile restricted diffusion and T2 weighted signal abnormality probably centered within the cortex of the left greater than right cerebral hemispheres; new patchy area of restricted diffusion and T2 weighted signal abnormality within the right cerebellum would be in keeping with recent ischemia  - 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  - MRI Brain WWO 6/21 early subacute IPHs in left thalamus and left cerebellar hemisphere; numerous chronic hemorrhages and micro hemorrhages throughout bilateral cerebral and cerebellar hemispheres  - CTH with evolving left dorsal pontine and smaller bilateral cerebellar recent to subacute parenchymal hemorrhages; no significant increased size lesion  - Repeat CTH 6/25 with new edema in left parietal region c/f new early ischemic injury  - Management per NCC        VTE Risk Mitigation (From admission, onward)           Ordered     IP VTE HIGH RISK PATIENT  Once         06/21/25 0556     Place sequential compression device  Until discontinued         06/21/25 0556      Reason for No Pharmacological VTE Prophylaxis  Once        Question:  Reasons:  Answer:  Active Bleeding    06/21/25 0556                    Kalyn Alberto PA-C  Neurology-Epilepsy  [facility]  Staff: Dr. Callahan       [1]   Current Facility-Administered Medications   Medication Dose Route Frequency Provider Last Rate Last Admin    acetaminophen tablet 650 mg  650 mg Per OG tube Q6H PRN Anne Mabry MD   650 mg at 06/25/25 2234    artificial tears 0.5 % ophthalmic solution 1 drop  1 drop Both Eyes TID Sebastian Beard PA-C   1 drop at 06/26/25 0830    dextrose 50% injection 12.5 g  12.5 g Intravenous PRN Nicko Cruz AGACNP-BC        fentaNYL injection 25 mcg  25 mcg Intravenous Q1H PRN Nicko Cruz AGACNP-BC   25 mcg at 06/25/25 0431    glucagon (human recombinant) injection 1 mg  1 mg Intramuscular PRN Nicko Cruz AGACNP-BC        insulin aspart U-100 pen 1-10 Units  1-10 Units Subcutaneous Q4H PRN Anne Mabry MD   4 Units at 06/26/25 0817    levETIRAcetam injection 750 mg  750 mg Intravenous Q12H Sebastian Beard PA-C   750 mg at 06/26/25 0817    mupirocin 2 % ointment   Nasal BID Anne Mabry MD   Given at 06/26/25 0830    ondansetron injection 4 mg  4 mg Intravenous Q4H PRN Nicko Cruz AGACNP-BC   4 mg at 06/22/25 1920    pantoprazole injection 40 mg  40 mg Intravenous BID Angela Almanzar PA-C   40 mg at 06/26/25 0817    piperacillin-tazobactam (ZOSYN) 4.5 g in D5W 100 mL IVPB (MB+)  4.5 g Intravenous Q8H Anne Mabry MD   Stopped at 06/26/25 1230    polyethylene glycol packet 17 g  17 g Per OG tube Daily Anne Mabry MD   17 g at 06/26/25 0817    pravastatin tablet 40 mg  40 mg Per OG tube Daily Anne Mabry MD   40 mg at 06/26/25 0817    senna-docusate 8.6-50 mg per tablet 1 tablet  1 tablet Per OG tube BID Anne Mabry MD   1 tablet at 06/26/25 0817    silodosin capsule 4 mg  4 mg Per OG tube Daily Anne Mabry MD    4 mg at 06/25/25 0808    sodium chloride 0.9% flush 10 mL  10 mL Intravenous PRN Nciko Cruz, AGACNP-BC

## 2025-06-26 NOTE — ASSESSMENT & PLAN NOTE
Patient with Hypoxic Respiratory failure which is Acute.  he is not on home oxygen. Supplemental oxygen was provided and noted- Vent Mode: A/C  Oxygen Concentration (%):  [40-50] 50  Resp Rate Total:  [19 br/min-31 br/min] 27 br/min  Vt Set:  [450 mL] 450 mL  PEEP/CPAP:  [5 cmH20-8 cmH20] 5 cmH20  Mean Airway Pressure:  [6.7 leZ34-98 cmH20] 6.7 cmH20    .   Signs/symptoms of respiratory failure include- cyanosis and cardiac arrest 2/2 hypoxemia. Contributing diagnoses includes - Pulmonary Embolus Labs and images were reviewed. Patient Has recent ABG, which has been reviewed. Will treat underlying causes and adjust management of respiratory failure as follows- s/p IV tPA. Holding argatroban gtt pending stabilization of CBC.     - RLL lobe collapse on CT chest, concern for aspiration PNA - > on zosyn, d/c if sputum cx negative  - Hold chest PT given abnormal coags, risk of worsening retrosternal hemorrhage   - Now on minimal vent settings

## 2025-06-26 NOTE — ASSESSMENT & PLAN NOTE
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 Prognosis guarded. Plan for family meeting 6/26

## 2025-06-26 NOTE — ASSESSMENT & PLAN NOTE
Noted on post-cardiac arrest imaging    - Worsening on repeat imaging, d/c IVFs  - Hold on diuresis given ZOE, pt on minimal vent settings; will give lasix PRN if pt w/ increase in oxygen requirements  - Sputum cx pending, MRSA screen negative -> d/c vanc, c/w zosyn pending respiratory cx

## 2025-06-26 NOTE — ACP (ADVANCE CARE PLANNING)
[facility]  I engaged the family (multiple family members in person, several additionally joined via zoom) in a voluntary conversation about advance care planning and we specifically addressed what the goals of care would be moving forward, in light of the patient's change in clinical status, specifically cardiac arrest, recurrent issues with clotting and bleeding, concern for anoxic brain injury.  We did specifically address the patient's likely prognosis, which is mixed. We specifically discussed that patient w/ intact pupils/corneals, however remains comatose off sedation x48 hrs, no response to stimuli. EEG w/o seizures, does have a continuous background (potentially favorable prognostic sign), however no reactivity (poor prognostic sign). MRI brain directly reviewed with extended family, has small R cerebellar infarct, however of more concern is the b/l cortical ribboning w/ diffusion restriction and edema in gyri pattern over L>R cerebral hemispheres. Discussed that MRI is consistent with HIE.     We specifically discussed that given mixed prognostic factors, could not state for sure that patient would not wake up/regain consciousness in the future, however any form of recovery would likely take weeks to months, during which time pt would likely need trach/PEG/placement. 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 We explored the patient's values and preferences for future care.  The family endorses that what is most important right now is to focus on quality of life, even if it means sacrificing a little time and improvement in condition but with limits to invasive therapies    We discussed in depth the two paths of care moving forwards, notably ongoing aggressive medical care including trach/PEG (and possible EGD) to give pt time for clearer neuroprognostication, vs transition to comfort based care w/ palliative extubation.  Discussed that given pt currently on minimal vent settings and off vasopressor support, may survive for several days s/p palliative extubation, plan would be to transfer to [facility] for ongoing hospice/EOL care if pt hemodynamically stable s/p extubation.     Multiple questions answered, brother Dr. Smith heavily contributing to discussion. Plan to continue current level of care x24-48 hrs to allow family to discuss what Judge Smith's wishes would have been in this situation and to make a decision regarding GOC moving forwards appropriately.    A total of 60 min was spent on advance care planning, goals of care discussion, emotional support, formulating and communicating prognosis and exploring burden/benefit of various approaches of treatment. This discussion occurred on a fully voluntary basis with the verbal consent of the family. Patient unable to participate in discussion due to intubated and unresponsive state.     Anne Mabry MD, MPH  Neurocritical Care Physician

## 2025-06-26 NOTE — ASSESSMENT & PLAN NOTE
Suspected to have PE resulting in cardiac arrest  - Drop in H/H 6/25  - CTH, CT Chest, CT Thigh 6/25 -> c/f right femoral DVT  - Management per Heme/onc and NCC

## 2025-06-26 NOTE — PLAN OF CARE
"Hematology Plan of Care    Chart and labs reviewed today. HIT Ab positive but WADE negative, which rules out HIT. His heparin "allergy" was removed from chart. Unfortunately no tolerating full dose anticoagulation given concern for bleeding and persistent transfusion requirements. Agree with holding anticoagulation. Neuroprognostication ongoing, appreciate NCC in the care of this patient.     Jayson Coyle MD  Hematology/Oncology PGY-V   "

## 2025-06-26 NOTE — ASSESSMENT & PLAN NOTE
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

## 2025-06-26 NOTE — ASSESSMENT & PLAN NOTE
ATN 2/2 cardiac arrest    - UOP 0.4 cc/kg/hr overnight, improved  - Creatinine downtrending to 1.8 this AM   - D/c IVFs given worsening R pleural effusion and anasarca, renal function improving   - Avoid nephrotoxic medications  - Jack in place for strict I&Os    Monitor renal function closely, pt at risk for progression to ARF

## 2025-06-26 NOTE — ASSESSMENT & PLAN NOTE
Noted at rehab facility prior to admission, HIT antibody+, WADE pending    - Briefly on heparin gtt on 6/21, d/c'd s/p receiving notification from rehab facility of positive antibody testing  - Argatroban gtt -> on hold due to concern for active GIB

## 2025-06-26 NOTE — ASSESSMENT & PLAN NOTE
Shock liver 2/2 cardiac arrest    - Downtrending  - Monitor daily  - Minimize hepatotoxic medications

## 2025-06-26 NOTE — SUBJECTIVE & OBJECTIVE
Interval History: EEG removed for MRI overnight. No seizures on EEG. Updated brother and granddaughter at bedside, answered all questions. NCC planning for GOC later today.    Current Medications[1]  Continuous Infusions:    Review of Systems  Objective:     Vital Signs (Most Recent):  Temp: 98.7 °F (37.1 °C) (06/26/25 0401)  Pulse: 104 (06/26/25 1300)  Resp: (!) 21 (06/26/25 1300)  BP: (!) 122/57 (06/26/25 1300)  SpO2: 98 % (06/26/25 1300) Vital Signs (24h Range):  Temp:  [97.2 °F (36.2 °C)-100.1 °F (37.8 °C)] 98.7 °F (37.1 °C)  Pulse:  [102-118] 104  Resp:  [19-32] 21  SpO2:  [94 %-100 %] 98 %  BP: (107-141)/(52-65) 122/57  Arterial Line BP: (100-151)/(50-63) 123/61     Weight: 103.9 kg (229 lb 0.9 oz)  Body mass index is 31.07 kg/m².     Physical Exam  Constitutional:       General: He is not in acute distress.     Appearance: He is ill-appearing. He is not diaphoretic.      Interventions: He is intubated.   HENT:      Head: Normocephalic and atraumatic.      Comments: EEG in place  Eyes:      General: No scleral icterus.        Right eye: No discharge.         Left eye: No discharge.      Conjunctiva/sclera: Conjunctivae normal.      Pupils: Pupils are equal, round, and reactive to light.   Cardiovascular:      Rate and Rhythm: Normal rate.   Pulmonary:      Effort: Pulmonary effort is normal. No respiratory distress. He is intubated.      Comments: Intubated  Musculoskeletal:         General: No deformity or signs of injury.   Skin:     General: Skin is warm and dry.   Psychiatric:      Comments: Unable to assess            NEUROLOGICAL EXAMINATION:     CRANIAL NERVES     CN III, IV, VI   Pupils are equal, round, and reactive to light.       Comatose  MATT OU   Corneal intact OU   No spontaneous eye opening  Face symmetric   Tongue midline   No myoclonus  No spontaneous movements  Does not follow commands  Triple flexion in BLE       Significant Labs: All pertinent lab results from the past 24 hours have been  reviewed.    Significant Studies: I have reviewed all pertinent imaging results/findings within the past 24 hours.       [1]   Current Facility-Administered Medications   Medication Dose Route Frequency Provider Last Rate Last Admin    acetaminophen tablet 650 mg  650 mg Per OG tube Q6H PRN Anne Mabry MD   650 mg at 06/25/25 2234    artificial tears 0.5 % ophthalmic solution 1 drop  1 drop Both Eyes TID Sebastian Beard PA-C   1 drop at 06/26/25 0830    dextrose 50% injection 12.5 g  12.5 g Intravenous PRN Nicko Cruz AGACNP-BC        fentaNYL injection 25 mcg  25 mcg Intravenous Q1H PRN Nicko Cruz AGACNP-BC   25 mcg at 06/25/25 0431    glucagon (human recombinant) injection 1 mg  1 mg Intramuscular PRN Nicko Cruz AGACNP-BC        insulin aspart U-100 pen 1-10 Units  1-10 Units Subcutaneous Q4H PRN Anne Mabry MD   4 Units at 06/26/25 0817    levETIRAcetam injection 750 mg  750 mg Intravenous Q12H Sebastian Beard PA-C   750 mg at 06/26/25 0817    mupirocin 2 % ointment   Nasal BID Anne Mabry MD   Given at 06/26/25 0830    ondansetron injection 4 mg  4 mg Intravenous Q4H PRN Nicko Cruz AGACNP-BC   4 mg at 06/22/25 1920    pantoprazole injection 40 mg  40 mg Intravenous BID Angela Almanzar PA-C   40 mg at 06/26/25 0817    piperacillin-tazobactam (ZOSYN) 4.5 g in D5W 100 mL IVPB (MB+)  4.5 g Intravenous Q8H Anne Mabry MD   Stopped at 06/26/25 1230    polyethylene glycol packet 17 g  17 g Per OG tube Daily Anne Mabry MD   17 g at 06/26/25 0817    pravastatin tablet 40 mg  40 mg Per OG tube Daily Anne Mabry MD   40 mg at 06/26/25 0817    senna-docusate 8.6-50 mg per tablet 1 tablet  1 tablet Per OG tube BID Anne Mabry MD   1 tablet at 06/26/25 0817    silodosin capsule 4 mg  4 mg Per OG tube Daily Anne Mabry MD   4 mg at 06/25/25 0808    sodium chloride 0.9% flush 10 mL  10 mL Intravenous PRN Nicko Cruz, AGAP-BC

## 2025-06-26 NOTE — PLAN OF CARE
"[facility] Care Plan  POC reviewed with Franko Smith and family at 1400. Family verbalized understanding. Questions and concerns addressed. No acute events today. Pt progressing toward goals. Will continue to monitor. See below and flowsheets for full assessment and VS info.     -Goals of care conference today in conference room      Is this a stroke patient? yes- Stroke booklet reviewed with family and is at bedside.   Care Plan Individualization:     Neuro:  [place] Coma Scale  Best Eye Response: 2-->(E2) to pain  Best Motor Response: 3-->(M3) flexion to pain  Best Verbal Response: 1-->(V1) none  Vinicio Coma Scale Score: 6  Assessment Qualifiers: Patient intubated  Pupil PERRLA: yes     24 hr Temp:  [98.7 °F (37.1 °C)-100.1 °F (37.8 °C)]     CV:   Rhythm: sinus tachycardia  BP goals:   SBP < 180  MAP > 65    Resp:      Vent Mode: A/C  Set Rate: 16 BPM  Oxygen Concentration (%): 40  Vt Set: 450 mL  PEEP/CPAP: 5 cmH20    Plan: Goals of care plan discussed today with family in conference room    GI/:     Diet/Nutrition Received: NPO, tube feeding  Last Bowel Movement: 06/20/25  Voiding Characteristics: external catheter    Intake/Output Summary (Last 24 hours) at 6/26/2025 1831  Last data filed at 6/26/2025 1800  Gross per 24 hour   Intake 1292.59 ml   Output 715 ml   Net 577.59 ml     Unmeasured Output  Unmeasured Urine Occurrence: 1  Unmeasured Stool Occurrence: 0    Labs/Accuchecks:  Recent Labs   Lab 06/26/25  0004   WBC 6.74   RBC 2.52*   HGB 7.1*   HCT 22.1*   *      Recent Labs   Lab 06/26/25  0814      K 4.1   CO2 21*      BUN 26*   CREATININE 1.5*   ALKPHOS 77   ALT 37   AST 37   BILITOT 1.1*      Recent Labs   Lab 06/26/25  0004   PROTIME 11.9   INR 1.1   APTT 31.6    No results for input(s): "CPK", "CPKMB", "TROPONINI", "MB" in the last 168 hours.    Electrolytes: N/A - electrolytes WDL  Accuchecks: Q6H    Gtts:      LDA/Wounds:    Johnathan Risk Assessment  Sensory Perception: " 1-->completely limited  Moisture: 3-->occasionally moist  Activity: 1-->bedfast  Mobility: 1-->completely immobile  Nutrition: 3-->adequate  Friction and Shear: 2-->potential problem  Johnathan Score: 11    Is your johnathan score 12 or less? yes enrolled in the peach program            Restraints:        LILIANA

## 2025-06-26 NOTE — ASSESSMENT & PLAN NOTE
History of  Diagnosed in 2023, s/p radiation therapy, was previously on hormonal therapy (recently discontinued once admitted to [facility] earlier this month)  CT chest/abdomen/pelvis with hepatic and renal lesions, negative for other primary malignancy  Generalized muscle weakness due to cancer and hormonal therapy  Follows regularly with Oncology

## 2025-06-26 NOTE — ASSESSMENT & PLAN NOTE
Multifocal IPH on 6/9 at OSH, likely in setting of therapeutic AC (on Eliquis) and multifocal cavernomas. 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 Started on AC w/ monitoring in NCC given recent IPH  IPH stable on repeat imaging, NSGY following, no surgical intervention at this time  Pt w/ PEA arrest on 6/23 2/2 massive PE, s/p IV tPA, now in acute DIC -> repeat head CT s/p cardiac arrest stable  Admit to [facility]  Q1h neuro checks, vitals, I/Os  Was previously on Eliquis for DVT/PE  Reversed earlier this month at [facility] and has not been restarted since  Pt is HIT positive. Heparin gtt transitioned to argatroban gtt. Therapeutic CTH stable   PT/OT/SLP -> on hold due to critical illness  VTE Prophylaxis: mechanical, argatroban gtt on hold due concern for active bleed  S/p IVC filter placement and subsequent removal on 6/23

## 2025-06-26 NOTE — PLAN OF CARE
[facility] Care Plan    POC reviewed with Franko Smith and family at 0300. Patient unable verbalized understanding. Questions and concerns addressed. No acute events today. Pt progressing toward goals. Will continue to monitor. See below and flowsheets for full assessment and VS info.     MRI complete  Bath complete  Tmax 100.1, tylenol admin        Is this a stroke patient? yes- Stroke booklet reviewed with patient and is at bedside.   Care Plan Individualization:     Neuro:  [place] Coma Scale  Best Eye Response: 2-->(E2) to pain  Best Motor Response: 3-->(M3) flexion to pain  Best Verbal Response: 1-->(V1) none  Vinicio Coma Scale Score: 6  Assessment Qualifiers: Patient intubated  Pupil PERRLA: yes     24 hr Temp:  [97.2 °F (36.2 °C)-100.1 °F (37.8 °C)]     CV:   Rhythm: sinus tachycardia  BP goals:   SBP < 160  MAP > 65    Resp:      Vent Mode: A/C  Set Rate: 16 BPM  Oxygen Concentration (%): 40  Vt Set: 450 mL  PEEP/CPAP: 5 cmH20    Plan: wean to extubate    GI/:     Diet/Nutrition Received: tube feeding  Last Bowel Movement: 06/20/25  Voiding Characteristics: urethral catheter (bladder)    Intake/Output Summary (Last 24 hours) at 6/26/2025 0537  Last data filed at 6/26/2025 0501  Gross per 24 hour   Intake 1698.34 ml   Output 945 ml   Net 753.34 ml     Unmeasured Output  Unmeasured Urine Occurrence: 1    Labs/Accuchecks:  Recent Labs   Lab 06/26/25  0004   WBC 6.74   RBC 2.52*   HGB 7.1*   HCT 22.1*   *      Recent Labs   Lab 06/26/25  0004      K 4.4   CO2 20*      BUN 25*   CREATININE 1.7*   ALKPHOS 82   ALT 43   AST 41   BILITOT 1.3*      Recent Labs   Lab 06/26/25  0004   PROTIME 11.9   INR 1.1   APTT 31.6      Recent Labs   Lab 06/19/25  0642          Electrolytes: No replacement orders  Accuchecks: Q4H    Gtts:      LDA/Wounds:    Johnathan Risk Assessment  Sensory Perception: 1-->completely limited  Moisture: 4-->rarely moist  Activity: 1-->bedfast  Mobility: 1-->completely  immobile  Nutrition: 3-->adequate  Friction and Shear: 2-->potential problem  Johnathan Score: 12  Is your johnathan score 12 or less? no          Restraints:        WCTM     Problem: Adult Inpatient Plan of Care  Goal: Plan of Care Review  Outcome: Progressing  Goal: Patient-Specific Goal (Individualized)  Outcome: Progressing  Goal: Absence of Hospital-Acquired Illness or Injury  Outcome: Progressing  Goal: Optimal Comfort and Wellbeing  Outcome: Progressing  Goal: Readiness for Transition of Care  Outcome: Progressing     Problem: Stroke, Intracerebral Hemorrhage  Goal: Optimal Coping  Outcome: Progressing  Goal: Effective Bowel Elimination  Outcome: Progressing  Goal: Optimal Cerebral Tissue Perfusion  Outcome: Progressing  Goal: Optimal Cognitive Function  Outcome: Progressing  Goal: Effective Communication Skills  Outcome: Progressing  Goal: Optimal Functional Ability  Outcome: Progressing  Goal: Optimal Nutrition Intake  Outcome: Progressing  Goal: Optimal Pain Control and Function  Outcome: Progressing  Goal: Effective Oxygenation and Ventilation  Outcome: Progressing  Goal: Improved Sensorimotor Function  Outcome: Progressing  Goal: Safe and Effective Swallow  Outcome: Progressing  Goal: Effective Urinary Elimination  Outcome: Progressing     Problem: Diabetes Comorbidity  Goal: Blood Glucose Level Within Targeted Range  Outcome: Progressing     Problem: Fall Injury Risk  Goal: Absence of Fall and Fall-Related Injury  Outcome: Progressing     Problem: Skin Injury Risk Increased  Goal: Skin Health and Integrity  Outcome: Progressing     Problem: Wound  Goal: Optimal Coping  Outcome: Progressing  Goal: Optimal Functional Ability  Outcome: Progressing  Goal: Absence of Infection Signs and Symptoms  Outcome: Progressing  Goal: Improved Oral Intake  Outcome: Progressing  Goal: Optimal Pain Control and Function  Outcome: Progressing  Goal: Skin Health and Integrity  Outcome: Progressing  Goal: Optimal Wound  Healing  Outcome: Progressing     Problem: Infection  Goal: Absence of Infection Signs and Symptoms  Outcome: Progressing     Problem: Sepsis/Septic Shock  Goal: Optimal Coping  Outcome: Progressing  Goal: Absence of Bleeding  Outcome: Progressing  Goal: Blood Glucose Level Within Targeted Range  Outcome: Progressing  Goal: Absence of Infection Signs and Symptoms  Outcome: Progressing  Goal: Optimal Nutrition Intake  Outcome: Progressing     Problem: Acute Kidney Injury/Impairment  Goal: Fluid and Electrolyte Balance  Outcome: Progressing  Goal: Improved Oral Intake  Outcome: Progressing  Goal: Effective Renal Function  Outcome: Progressing

## 2025-06-27 NOTE — ASSESSMENT & PLAN NOTE
Noted at rehab facility prior to admission, HIT antibody+, WADE pending    - Briefly on heparin gtt on 6/21, d/c'd s/p receiving notification from rehab facility of positive antibody testing  - WADE negative, however given high clinical suspicion, will c/w HIT precautions, discuss resending WADE w/ heme team   - Argatroban gtt -> on hold due to concern for active GIB

## 2025-06-27 NOTE — ASSESSMENT & PLAN NOTE
ATN 2/2 cardiac arrest    - UOP 0.4 cc/kg/hr overnight, stable  - Creatinine downtrending to 1.7 this AM   - Avoid nephrotoxic medications  - Jack in place for strict I&Os    Close to baseline renal function. No indication for RRT at this time

## 2025-06-27 NOTE — ASSESSMENT & PLAN NOTE
Multifocal IPH on 6/9 at OSH, likely in setting of therapeutic AC (on Eliquis) and multifocal cavernomas. 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 Started on AC w/ monitoring in NCC given recent IPH  IPH stable on repeat imaging, NSGY following, no surgical intervention at this time  Pt w/ PEA arrest on 6/23 2/2 massive PE, s/p IV tPA, now in acute DIC -> repeat head CT s/p cardiac arrest stable  Admit to [facility]  Q1h neuro checks, vitals, I/Os  Was previously on Eliquis for DVT/PE  Reversed earlier this month at [facility] and has not been restarted since  Pt is ARTEMIO HIT positive. Heparin gtt transitioned to argatroban gtt. Therapeutic CTH stable   PT/OT/SLP -> on hold due to critical illness  VTE Prophylaxis: mechanical, argatroban gtt on hold due concern for active bleed  S/p IVC filter placement and subsequent removal on 6/23

## 2025-06-27 NOTE — SUBJECTIVE & OBJECTIVE
Interval History:  Please see hospital course above for full details    Review of Systems   Unable to perform ROS: Intubated       Objective:     Vitals:  Temp: 97.9 °F (36.6 °C)  Pulse: 105  Rhythm: normal sinus rhythm  BP: (!) 125/58  MAP (mmHg): 84  Resp: (!) 23  SpO2: (!) 93 %  Oxygen Concentration (%): 40  Vent Mode: A/C  Set Rate: 16 BPM  Vt Set: 450 mL  PEEP/CPAP: 5 cmH20  Peak Airway Pressure: 17 cmH20  Mean Airway Pressure: 8.7 cmH20  Plateau Pressure: 0 cmH20    Temp  Min: 97.9 °F (36.6 °C)  Max: 100.1 °F (37.8 °C)  Pulse  Min: 86  Max: 108  BP  Min: 119/59  Max: 158/74  MAP (mmHg)  Min: 82  Max: 110  Resp  Min: 6  Max: 26  SpO2  Min: 93 %  Max: 99 %  Oxygen Concentration (%)  Min: 40  Max: 40    06/26 0701 - 06/27 0700  In: 1826.8 [I.V.:60]  Out: 670 [Urine:670]   Unmeasured Output  Unmeasured Urine Occurrence: 1  Unmeasured Stool Occurrence: 0        Physical Exam  Elderly gentleman resting in bed, intubated, off sedation. No response to stimuli. Reminder of exam deferred given C       Medications:  Continuous Scheduledartificial tears, 1 drop, TID  levETIRAcetam (Keppra) IV (PEDS and ADULTS), 750 mg, Q12H  piperacillin-tazobactam (Zosyn) IV (PEDS and ADULTS) (extended infusion is not appropriate), 4.5 g, Q8H  scopolamine, 1 patch, Q3 Days    PRNacetaminophen, 650 mg, Q6H PRN  dextrose 50%, 12.5 g, PRN  fentaNYL, 25 mcg, Q1H PRN  glucagon (human recombinant), 1 mg, PRN  insulin aspart U-100, 1-10 Units, Q4H PRN  ondansetron, 4 mg, Q4H PRN  sodium chloride 0.9%, 10 mL, PRN      Today I personally reviewed pertinent imaging, laboratory results, notably: CXR w/ large R pleural effusion, unchanged from prior. CBC w/o leukocytosis, Hb 6.7 -> 7.5, platelets uptrending to 151. INR normalized, PTT 32.4, fibrinogen uptrending to 405. CMP w/ creatinine 1.3 (back to baseline), transaminitis resolved.     Diet  Diet NPO  Diet NPO  TF at goal -> hold at MN for palliative extubation

## 2025-06-27 NOTE — EICU
JASON Night Rounds Checklist  24H Vital Sign Range:  Temp:  [98.7 °F (37.1 °C)-100.1 °F (37.8 °C)]   Pulse:  []   Resp:  [6-26]   BP: (107-150)/(52-95)   SpO2:  [95 %-100 %]   Arterial Line BP: (104-194)/(46-79)     Video rounds

## 2025-06-27 NOTE — EICU
Virtual ICU Quality Rounds    Admit Date: 6/20/2025  Hospital Day: 6    ICU Day: 6d    24H Vital Sign Range:  Temp:  [97.9 °F (36.6 °C)-100.1 °F (37.8 °C)]   Pulse:  []   Resp:  [6-26]   BP: (116-158)/(54-95)   SpO2:  [95 %-99 %]   Arterial Line BP: (110-194)/(46-79)     VICU Surveillance Screening  Daily review of  line necessity(optional): Urinary catheter in place  Nguyen Indications : Critically ill in the intensive care unit requiring hourly urine output monitoring   LDA reconciliation : Yes  Nguyen best practices : Nurse driven nguyen removal protocol ordered                with history of GI Bleed  upon discharge- Hb 7.8- same  -Active type and screen x 2  -Transfuse if <7

## 2025-06-27 NOTE — ASSESSMENT & PLAN NOTE
Noted on exam    - CT thigh w/o evidence of hematoma formation, does have R>L soft tissue edema  - Suspect RLE swelling 2/2 known R femoral and iliac DVT  - Monitor distal pulses -> currently dopplerable   - Management of DVT as able -> no plans to resume AC at this time given recurrent bleeding, GOC

## 2025-06-27 NOTE — PLAN OF CARE
"[facility] Care Plan    POC reviewed with Franko Smith and family at 0300. Patient unable to verbalized understanding. Questions and concerns addressed. No acute events today. Pt progressing toward goals. Will continue to monitor. See below and flowsheets for full assessment and VS info.     1 U RBC  Jack exchanged  OGT out of position, replaced.   Tylenol for 100.1 tmax  Fent x 2  Bath completed        Is this a stroke patient? yes- Stroke booklet reviewed with patient and is at bedside.   Care Plan Individualization:     Neuro:  [place] Coma Scale  Best Eye Response: 2-->(E2) to pain  Best Motor Response: 3-->(M3) flexion to pain  Best Verbal Response: 1-->(V1) none  Vinicio Coma Scale Score: 6  Assessment Qualifiers: Patient intubated  Pupil PERRLA: yes     24 hr Temp:  [98.6 °F (37 °C)-100.1 °F (37.8 °C)]     CV:   Rhythm: sinus tachycardia  BP goals:   SBP < 180  MAP > 65    Resp:      Vent Mode: A/C  Set Rate: 16 BPM  Oxygen Concentration (%): 40  Vt Set: 450 mL  PEEP/CPAP: 5 cmH20    Plan: wean to extubate    GI/:     Diet/Nutrition Received: NPO, tube feeding  Last Bowel Movement: 06/20/25  Voiding Characteristics: urethral catheter (bladder)    Intake/Output Summary (Last 24 hours) at 6/27/2025 0622  Last data filed at 6/27/2025 0601  Gross per 24 hour   Intake 1478.83 ml   Output 670 ml   Net 808.83 ml     Unmeasured Output  Unmeasured Urine Occurrence: 1  Unmeasured Stool Occurrence: 0    Labs/Accuchecks:  Recent Labs   Lab 06/27/25  0036 06/27/25  0337   WBC 6.49  --    RBC 2.54*  --    HGB 7.1*  --    HCT 22.6* 21*   *  --       Recent Labs   Lab 06/26/25  1836      K 4.3   CO2 19*   *   BUN 23   CREATININE 1.4   ALKPHOS 84   ALT 36   AST 38   BILITOT 1.0      Recent Labs   Lab 06/27/25  0036   PROTIME 11.4   INR 1.0   APTT 32.4*    No results for input(s): "CPK", "CPKMB", "TROPONINI", "MB" in the last 168 hours.    Electrolytes: No replacement orders  Accuchecks: " Q4H    Gtts:      LDA/Wounds:    Johnathan Risk Assessment  Sensory Perception: 1-->completely limited  Moisture: 3-->occasionally moist  Activity: 1-->bedfast  Mobility: 1-->completely immobile  Nutrition: 3-->adequate  Friction and Shear: 2-->potential problem  Johnathan Score: 11  Is your johnathan score 12 or less? yes enrolled in the peach program          Restraints:        WCTM     Problem: Adult Inpatient Plan of Care  Goal: Plan of Care Review  Outcome: Progressing  Goal: Patient-Specific Goal (Individualized)  Outcome: Progressing  Goal: Absence of Hospital-Acquired Illness or Injury  Outcome: Progressing  Goal: Optimal Comfort and Wellbeing  Outcome: Progressing  Goal: Readiness for Transition of Care  Outcome: Progressing     Problem: Stroke, Intracerebral Hemorrhage  Goal: Optimal Coping  Outcome: Progressing  Goal: Effective Bowel Elimination  Outcome: Progressing  Goal: Optimal Cerebral Tissue Perfusion  Outcome: Progressing  Goal: Optimal Cognitive Function  Outcome: Progressing  Goal: Effective Communication Skills  Outcome: Progressing  Goal: Optimal Functional Ability  Outcome: Progressing  Goal: Optimal Nutrition Intake  Outcome: Progressing  Goal: Optimal Pain Control and Function  Outcome: Progressing  Goal: Effective Oxygenation and Ventilation  Outcome: Progressing  Goal: Improved Sensorimotor Function  Outcome: Progressing  Goal: Safe and Effective Swallow  Outcome: Progressing  Goal: Effective Urinary Elimination  Outcome: Progressing     Problem: Diabetes Comorbidity  Goal: Blood Glucose Level Within Targeted Range  Outcome: Progressing     Problem: Fall Injury Risk  Goal: Absence of Fall and Fall-Related Injury  Outcome: Progressing     Problem: Skin Injury Risk Increased  Goal: Skin Health and Integrity  Outcome: Progressing     Problem: Wound  Goal: Optimal Coping  Outcome: Progressing  Goal: Optimal Functional Ability  Outcome: Progressing  Goal: Absence of Infection Signs and  Symptoms  Outcome: Progressing  Goal: Improved Oral Intake  Outcome: Progressing  Goal: Optimal Pain Control and Function  Outcome: Progressing  Goal: Skin Health and Integrity  Outcome: Progressing  Goal: Optimal Wound Healing  Outcome: Progressing     Problem: Infection  Goal: Absence of Infection Signs and Symptoms  Outcome: Progressing     Problem: Sepsis/Septic Shock  Goal: Optimal Coping  Outcome: Progressing  Goal: Absence of Bleeding  Outcome: Progressing  Goal: Blood Glucose Level Within Targeted Range  Outcome: Progressing  Goal: Absence of Infection Signs and Symptoms  Outcome: Progressing  Goal: Optimal Nutrition Intake  Outcome: Progressing     Problem: Acute Kidney Injury/Impairment  Goal: Fluid and Electrolyte Balance  Outcome: Progressing  Goal: Improved Oral Intake  Outcome: Progressing  Goal: Effective Renal Function  Outcome: Progressing     Problem: Delirium  Goal: Optimal Coping  Outcome: Progressing  Goal: Improved Behavioral Control  Outcome: Progressing  Goal: Improved Attention and Thought Clarity  Outcome: Progressing  Goal: Improved Sleep  Outcome: Progressing

## 2025-06-27 NOTE — ASSESSMENT & PLAN NOTE
Intubated for acute hypoxemic respiratory failure 2/2 suspected massive PE on 6/23    - Initial ABG w/ hypercapnia and hypoxemic, improved on repeat   - Wean vent as tolerated for goal SpO2>90% -> currently on minimal vent settings, not a candidate for further vent wean 2/2 mental status   - Goal eucarbia  - S/p tPA for PE, argatroban gtt on hold for concern for active bleeding  - Daily CXR     Will need trach/PEG for ongoing supportive care if consistent w/ family decision re GOC -> family electing to transition to comfort based care w/ palliative extubation on 6/28

## 2025-06-27 NOTE — ASSESSMENT & PLAN NOTE
Lab values s/p cardiac arrest consistent w/ acute DIC, likely provoked in setting of HIT and cardiac arrest    - s/p IV tPA 50 mg x1 during arrest, given at 0952  - s/p cryo x1 for persistently low fibrinogen   - Coags remained variable overnight, very sensitive to restarting anticoagulation  - Monitor PT/INR, fibrinogen, CBC qday -> d/c lab draws per family discussion, GOC

## 2025-06-27 NOTE — PROGRESS NOTES
[facility] - Neuro Critical Care  Neurocritical Care  Progress Note    Admit Date: 6/20/2025  Service Date: 06/27/2025  Length of Stay: 6    Subjective:     Chief Complaint: ICH (intracerebral hemorrhage)    History of Present Illness: 80 y/o M with a PMH of prostate CA (diagnosed 2023 s/p radiation), PE (diagnosed in Feb of 2024, was previously on Eliquis until recent IPH), RLE DVT, HTN, and HLD who presents to [facility] ED from [facility] rehab with increased nausea/vomiting. He initially was admitted to [facility] w/ R sided numbness/tingling and dizziness for two days prior. He initially reported the numbness started on the R side of his face and had extended to his R hand and R side of his abdomen without any RLE numbness/tingling. 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 Per chart review, he had minimal dysmetria in the RUE while at [facility], but otherwise had no focal deficits. MRI with and without contrast was brain negative for any mass. An IVC filter was placed, and his oral AC was not resumed. He was transferred to a rehab facility, and was started on SQH for DVT PPX. He then transferred to [facility] ED for complaints of increased nausea, vomiting, and a headache. On arrival to [facility] ED, he was neuro intact. A CT head revealed similar appearance of previously known lesions, and new, small supratentorial lesions were noted. There was a CT abdomen with IV contrast that was completed for malignancy evaluation which was negative for malignancy but did reveal lower extremity DVT extending up to the femoral vein. Apparent filling defect in the IVC above and surrounding the IVC filter could represent thrombus or mixing of IV contrast. BUE and BLE US pending. MRI with and without also pending. NCC was consulted for IPH. Due tot he need for anticoagulation therapy due to DVT and potential IVC filter thrombosis, he will  be admitted to [facility] for close monitoring and higher level of care.    Hospital Course: 06/22/2025 Pt is HIT positive. Heparin gtt discontinued and started on argatroban yesterday. Therapeutic CTH stable. WADE pending. Plt improved to 90. Hematology consulted and recommending removal of the IVC filter given its prothrombotic risk. IR was consulted for evaluation. ZOE improved, Cr 1.5- discontinue IVF. Stable CTH on therapeutic argatroban. Transition to Eliquis 10mg BID for 6 more days then 5mg BID. Scopolamine patch ordered for nausea.    06/23/2025 Pt transitioned back to argatroban gtt per IR recommendations, PTT therapeutic overnight, went to IR this AM for IVC retrieval and clot removal -> coded in procedure, likely PE from piece of clot breaking off during attempted retrieval. Given IV tPA 50 mg in code, epi x4, ROSC achieved after ~12-15 minutes, please see significant event note for full details. Family updated extensively at bedside s/p code.   06/24/2025 Briefly restarted on argatroban gtt overnight, held 2/2 supratherapeutic PTT. Noted to have stimulus induced myoclonus on exam, hooked up to cEEG -> some epileptiform discharges on R, not time locked to clinical myoclonus. Loaded w/ keppra to prevent seizures given abnormal EEG. Non-responsive off sedation. UOP poor. Oxygenation improved, now on minimal vent settings. Family updated at bedside.   06/25/2025 Supratherapeutic PTT again overnight, CBC w/ drop in Hb to 6.3, given 1 u PRBC. CT C/A/P/thighs obtained to r/o internal bleeding, notably given R thigh markedly larger than L thigh on exam -> CT w/ worsening R pleural effusion, no active hemorrhage. IVFs held, UOP/creatinine improving. Started on pantoprazole 40 mg IV BID for ? Slow GIB, holding on GI consult given pt hemodynamically stable off vasopressor support, pending neuroprognostication s/p cardiac arrest. Holding argatroban gtt given persistent transfusion requirements.   06/26/2025 Extensive GOC  conversation held with family, see ACP note for full details. Continuing w/ current level of care pending family decision. Holding argatroban gtt given downtrending Hb  06/27/2025 Received 1 u PRBCs overnight for Hb 6.7, improved to 7.5 this AM. Neuro exam unchanged. Family electing to transition to comfort based care w/ palliative extubation on 6/28, made DNR/no escalation of care w/ discontinuation of lab draws and neuro checks pending palliative extubation     Interval History:  Please see hospital course above for full details    Review of Systems   Unable to perform ROS: Intubated       Objective:     Vitals:  Temp: 97.9 °F (36.6 °C)  Pulse: 105  Rhythm: normal sinus rhythm  BP: (!) 125/58  MAP (mmHg): 84  Resp: (!) 23  SpO2: (!) 93 %  Oxygen Concentration (%): 40  Vent Mode: A/C  Set Rate: 16 BPM  Vt Set: 450 mL  PEEP/CPAP: 5 cmH20  Peak Airway Pressure: 17 cmH20  Mean Airway Pressure: 8.7 cmH20  Plateau Pressure: 0 cmH20    Temp  Min: 97.9 °F (36.6 °C)  Max: 100.1 °F (37.8 °C)  Pulse  Min: 86  Max: 108  BP  Min: 119/59  Max: 158/74  MAP (mmHg)  Min: 82  Max: 110  Resp  Min: 6  Max: 26  SpO2  Min: 93 %  Max: 99 %  Oxygen Concentration (%)  Min: 40  Max: 40    06/26 0701 - 06/27 0700  In: 1826.8 [I.V.:60]  Out: 670 [Urine:670]   Unmeasured Output  Unmeasured Urine Occurrence: 1  Unmeasured Stool Occurrence: 0        Physical Exam  Elderly gentleman resting in bed, intubated, off sedation. No response to stimuli. Reminder of exam deferred given GOC       Medications:  Continuous Scheduledartificial tears, 1 drop, TID  levETIRAcetam (Keppra) IV (PEDS and ADULTS), 750 mg, Q12H  piperacillin-tazobactam (Zosyn) IV (PEDS and ADULTS) (extended infusion is not appropriate), 4.5 g, Q8H  scopolamine, 1 patch, Q3 Days    PRNacetaminophen, 650 mg, Q6H PRN  dextrose 50%, 12.5 g, PRN  fentaNYL, 25 mcg, Q1H PRN  glucagon (human recombinant), 1 mg, PRN  insulin aspart U-100, 1-10 Units, Q4H PRN  ondansetron, 4 mg, Q4H  PRN  sodium chloride 0.9%, 10 mL, PRN      Today I personally reviewed pertinent imaging, laboratory results, notably: CXR w/ large R pleural effusion, unchanged from prior. CBC w/o leukocytosis, Hb 6.7 -> 7.5, platelets uptrending to 151. INR normalized, PTT 32.4, fibrinogen uptrending to 405. CMP w/ creatinine 1.3 (back to baseline), transaminitis resolved.     Diet  Diet NPO  Diet NPO  TF at goal -> hold at MN for palliative extubation     Assessment/Plan:     Neuro  * ICH (intracerebral hemorrhage)  Multifocal IPH on 6/9 at OSH, likely in setting of therapeutic AC (on Eliquis) and multifocal cavernomas. 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 Started on AC w/ monitoring in NCC given recent IPH  IPH stable on repeat imaging, NSGY following, no surgical intervention at this time  Pt w/ PEA arrest on 6/23 2/2 massive PE, s/p IV tPA, now in acute DIC -> repeat head CT s/p cardiac arrest stable  Admit to [facility]  Was previously on Eliquis for DVT/PE  Reversed earlier this month at [facility] and has not been restarted since  Pt is ARTEMIO HIT positive. Heparin gtt transitioned to argatroban gtt. Therapeutic CTH stable   PT/OT/SLP -> on hold due to critical illness  VTE Prophylaxis: mechanical, argatroban gtt on hold due concern for active bleed/GOC  S/p IVC filter placement and subsequent removal on 6/23    Plan to transition to comfort based care on 6/28 due to HIE       Severe hypoxic-ischemic encephalopathy  2/2 cardiac arrest on 6/23    Encephalopathy  2/2 cardiac arrest    Pulmonary  Pleural effusion  Noted on post-cardiac arrest imaging    - Worsening on repeat imaging, d/c IVFs  - Hold on diuresis given ZOE, pt on minimal vent settings; will give lasix PRN if pt w/ increase in  oxygen requirements  - Sputum cx pending, MRSA screen negative -> c/w zosyn due to worsening fever curve and secretions   - Family requesting continuation of zosyn pending planned palliative extubation on 6/28, will d/c at time of transition to comfort based care    Acute respiratory failure with hypoxia  Patient with Hypoxic Respiratory failure which is Acute.  he is not on home oxygen. Supplemental oxygen was provided and noted- Vent Mode: A/C  Oxygen Concentration (%):  [40] 40  Resp Rate Total:  [17 br/min-26 br/min] 23 br/min  Vt Set:  [450 mL] 450 mL  PEEP/CPAP:  [5 cmH20] 5 cmH20  Mean Airway Pressure:  [6.8 cmH20-10 cmH20] 8.7 cmH20    .   Signs/symptoms of respiratory failure include- cyanosis and cardiac arrest 2/2 hypoxemia. Contributing diagnoses includes - Pulmonary Embolus Labs and images were reviewed. Patient Has recent ABG, which has been reviewed. Will treat underlying causes and adjust management of respiratory failure as follows- s/p IV tPA. Holding argatroban gtt pending stabilization of CBC.     - RLL lobe collapse on CT chest, concern for aspiration PNA - > on zosyn, continue pending palliative extubation per family request  - Hold chest PT given abnormal coags, risk of worsening retrosternal hemorrhage   - Now on minimal vent settings -> not a candidate for extubation due to mental status    Plan for palliative extubation on 6/28 pending arrival of additional family to bedside    On mechanically assisted ventilation  Intubated for acute hypoxemic respiratory failure 2/2 suspected massive PE on 6/23    - Initial ABG w/ hypercapnia and hypoxemic, improved on repeat   - Wean vent as tolerated for goal SpO2>90% -> currently on minimal vent settings, not a candidate for further vent wean 2/2 mental status   - Goal eucarbia  - S/p tPA for PE, argatroban gtt on hold for concern for active bleeding  - Daily CXR     Will need trach/PEG for ongoing supportive care if consistent w/ family decision re  GOC -> family electing to transition to comfort based care w/ palliative extubation on 6/28     Cardiac/Vascular  Cardiac arrest  S/p PEA arrest on 6/23 during IR procedure for clot removal and IVC filter retrieval  ROSC achieved after ~12-15 minutes, s/p epi x4, IV tPA 50 mg x1  Etiology presumed PE     - Not a candidate for TTM given hypercoaguable state, strict normothermia  - cEEG w/ epileptiform discharges, not time locked to clinical myoclonus -> 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 Prognosis guarded -> family electing to transition to comfort based care w/ plans for palliative extubation on 6/28, please see ACP notes from 6/26 and 6/27 for details. Will c/w keppra 750 mg BID s/p extubation given clinical myoclonus improved s/p keppra load    Hyperlipidemia  History of  Statin on hold due to transaminitis  Lipid panel reviewed    Essential hypertension  History of  TTE, EKG reviewed  SBP < 160. MAP>65  Holding home meds given pt hypotensive s/p cardiac arrest    Renal/  ZOE (acute kidney injury)  ATN 2/2 cardiac arrest    - Creatinine downtrending to 1.3 this AM, back to baseline  - Avoid nephrotoxic medications  - Jack in place for strict I&Os -> will maintain in place for patient comfort given [facility]      Hematology  Thrombocytopenia  2/2 HIT    - Improved -> stop trending given [facility]      HIT (heparin-induced thrombocytopenia)  Noted at rehab facility prior to admission, HIT antibody+, WADE pending    - Briefly on heparin gtt on 6/21, d/c'd s/p receiving notification from rehab facility of positive antibody testing  - WADE negative, however given high clinical suspicion, will c/w HIT precautions, discuss  resending WADE w/ heme team -> no longer indicated given [facility]  - Argatroban gtt -> on hold due to [facility], concern for active GIB    DIC (disseminated intravascular coagulation)  Lab values s/p cardiac arrest consistent w/ acute DIC, likely provoked in setting of HIT and cardiac arrest    - s/p IV tPA 50 mg x1 during arrest, given at 0952  - s/p cryo x1 for persistently low fibrinogen   - Coags remained variable overnight, very sensitive to restarting anticoagulation  - Monitor PT/INR, fibrinogen, CBC qday -> d/c lab draws per family discussion, [facility]     Deep vein thrombosis (DVT) of lower extremity  History of  Was previously on Eliquis for DVT/PE  BLE US at [facility] earlier this month revealed RLE popliteal DVT  CT abdomen pelvis with IV contrast at [facility] showed extension of DVT to the femoral vein with potential clot around the IVC filter  Pt is HIT positive.   Hematology consulted and recommending removal of the IVC filter given its prothrombotic risk -> IVC filter removed 6/23, unable to fully remove clot prior to patient coding  WADE negative-> c/w HIT precautions given high clinical suspicion. Discuss resending WADE w/ hematology team -> no indication at this time given [facility]  Suspect DVT as etiology of RLE swelling, NTD at this time. At risk of post-thrombotic syndrome if survives current hospitalization  S/p IV tPA on 6/23 during cardiac arrest, argatroban gtt currently on hold due to recurrent transfusion requirement/concern for GIB   Monitor coags daily -> d/c given [facility]    Oncology  Acute on chronic anemia  Anemia of chronic illness present at admission    Worsening anemia s/p cardiac arrest, s/p 2 u PRBCs on 6/23, 1 u cryo 6/23, 1 u PRBCs 6/25, 1 u PRBCs 6/26    - CT C/A/P x2 and CT thigh w/ small retrosternal hematoma, no evidence of active hemorrhage   - ? GIB vs consumptive anemia given persistent transfusion requirement    Pt currently hemodynamically stable off vasopressor support and with guarded  neuroprognostication. Holding on GI consult pending family decision re GOC -> family electing to transition to comfort based care. D/c lab draws, no plans for further blood transfusions or interventions at this time    Prostate cancer  History of  Diagnosed in 2023, s/p radiation therapy, was previously on hormonal therapy (recently discontinued once admitted to [facility] earlier this month)  CT chest/abdomen/pelvis with hepatic and renal lesions, negative for other primary malignancy  Generalized muscle weakness due to cancer and hormonal therapy  Follows regularly with Oncology    Endocrine  Diabetes mellitus  History of  Hold home meds  SSI PRN  A1C 6.8%    GI  Transaminitis  Shock liver 2/2 cardiac arrest    - Resolved  - No longer trending given GOC  - Minimize hepatotoxic medications    Gastro-esophageal reflux disease without esophagitis  History of  On PPI BID in place of home famotidine given concern for GIB    Orthopedic  Right leg swelling  Noted on exam    - CT thigh w/o evidence of hematoma formation, does have R>L soft tissue edema  - Suspect RLE swelling 2/2 known R femoral and iliac DVT  - Monitor distal pulses -> currently dopplerable   - Management of DVT as able -> no plans to resume AC at this time given recurrent bleeding, GOC    Palliative Care  Goals of care, counseling/discussion  See ACP notes dated 6/26 and 6/27 for full details    - Plan for palliative extubation w/ transition to comfort based care on 6/28  - D/c neuro checks and labs draws pending extubation, pt made DNR, no escalation of care  - C/w zosyn pending extubation per family request  - C/w keppra 750 mg BID given clinical myoclonus, pt at risk for clinical seizures     Possible transfer to HPU s/p palliative extubation, formal consult pending clinical status s/p extubation. Family aware and in agreement if stable for transport s/p extubation           The patient is being Prophylaxed for:  Venous Thromboembolism with: None  Stress  Ulcer with: None  Ventilator Pneumonia with: none    Activity Orders            Elevate HOB Elevate HOB 30-45 degrees during feeding unless contraindicated starting at 06/24 0858    Diet NPO: NPO starting at 06/23 0001    Turn patient starting at 06/21 0600    Elevate HOB starting at 06/21 0552          DNR, no escalation of care. Transition to comfort based care w/ palliative extubation on 6/28    Uninterrupted Critical Care/Counseling Time (not including procedures): 40 minutes  There is high probability for acute neurological change leading to clinical and possibly life-threatening deterioration requiring highest level of physician preparedness for urgent intervention. Critical care time was spent personally by me on the following activities: development of treatment plan with patient or surrogate and bedside caregivers, discussions with consultants, evaluation of patient's response to treatment, examination of patient, ordering and performing treatments and interventions, ordering and review of laboratory studies, ordering and review of radiographic studies, pulse oximetry, antibiotic titration if applicable, vasopressor titration if applicable, re-evaluation of patient's condition. I spent greater than 50% of the documented critical care time assessing and making medical decisions for this patient.       Anne Mabry MD  Neurocritical Care  [facility] - Neuro Critical Care

## 2025-06-27 NOTE — ASSESSMENT & PLAN NOTE
Noted at rehab facility prior to admission, HIT antibody+, WADE pending    - Briefly on heparin gtt on 6/21, d/c'd s/p receiving notification from rehab facility of positive antibody testing  - WADE negative, however given high clinical suspicion, will c/w HIT precautions, discuss resending WADE w/ heme team -> no longer indicated given GOC  - Argatroban gtt -> on hold due to GOC, concern for active GIB

## 2025-06-27 NOTE — ASSESSMENT & PLAN NOTE
Anemia of chronic illness present at admission    Worsening anemia s/p cardiac arrest, s/p 2 u PRBCs on 6/23, 1 u cryo 6/23, 1 u PRBCs 6/25    - CT C/A/P x2 and CT thigh w/ small retrosternal hematoma, no evidence of active hemorrhage   - ? GIB vs consumptive anemia given persistent transfusion requirement  - C/w protonix IV BID  - Monitor CBC q8hrs, goal Hb>7  - Hold argatroban gtt today pending stabilization of coags and Hb    Pt currently hemodynamically stable off vasopressor support and with guarded neuroprognostication. Holding on GI consult pending family decision re GOC

## 2025-06-27 NOTE — ASSESSMENT & PLAN NOTE
Patient with Hypoxic Respiratory failure which is Acute.  he is not on home oxygen. Supplemental oxygen was provided and noted- Vent Mode: A/C  Oxygen Concentration (%):  [40-50] 40  Resp Rate Total:  [19 br/min-33 br/min] 22 br/min  Vt Set:  [450 mL] 450 mL  PEEP/CPAP:  [5 cmH20] 5 cmH20  Mean Airway Pressure:  [7.6 cmH20-8.7 cmH20] 8 cmH20    .   Signs/symptoms of respiratory failure include- cyanosis and cardiac arrest 2/2 hypoxemia. Contributing diagnoses includes - Pulmonary Embolus Labs and images were reviewed. Patient Has recent ABG, which has been reviewed. Will treat underlying causes and adjust management of respiratory failure as follows- s/p IV tPA. Holding argatroban gtt pending stabilization of CBC.     - RLL lobe collapse on CT chest, concern for aspiration PNA - > on zosyn, continue given uptrending fever curve  - Hold chest PT given abnormal coags, risk of worsening retrosternal hemorrhage   - Now on minimal vent settings

## 2025-06-27 NOTE — PROGRESS NOTES
[facility] - Neuro Critical Care  Neurocritical Care  Progress Note    Admit Date: 6/20/2025  Service Date: 06/26/2025  Length of Stay: 5    Subjective:     Chief Complaint: ICH (intracerebral hemorrhage)    History of Present Illness: 80 y/o M with a PMH of prostate CA (diagnosed 2023 s/p radiation), PE (diagnosed in Feb of 2024, was previously on Eliquis until recent IPH), RLE DVT, HTN, and HLD who presents to [facility] ED from [facility] rehab with increased nausea/vomiting. He initially was admitted to [facility]/ R sided numbness/tingling and dizziness for two days prior. He initially reported the numbness started on the R side of his face and had extended to his R hand and R side of his abdomen without any RLE numbness/tingling. 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 Per chart review, he had minimal dysmetria in the RUE while at [facility], but otherwise had no focal deficits. MRI with and without contrast was brain negative for any mass. An IVC filter was placed, and his oral AC was not resumed. He was transferred to a rehab facility, and was started on SQH for DVT PPX. He then transferred to [facility] ED for complaints of increased nausea, vomiting, and a headache. On arrival to [facility] ED, he was neuro intact. A CT head revealed similar appearance of previously known lesions, and new, small supratentorial lesions were noted. There was a CT abdomen with IV contrast that was completed for malignancy evaluation which was negative for malignancy but did reveal lower extremity DVT extending up to the femoral vein. Apparent filling defect in the IVC above and surrounding the IVC filter could represent thrombus or mixing of IV contrast. BUE and BLE US pending. MRI with and without also pending. NCC was consulted for IPH. Due tot he need for anticoagulation therapy due to DVT and potential IVC filter thrombosis, he will  be admitted to [facility] for close monitoring and higher level of care.    Hospital Course: 06/22/2025 Pt is HIT positive. Heparin gtt discontinued and started on argatroban yesterday. Therapeutic CTH stable. WADE pending. Plt improved to 90. Hematology consulted and recommending removal of the IVC filter given its prothrombotic risk. IR was consulted for evaluation. ZOE improved, Cr 1.5- discontinue IVF. Stable CTH on therapeutic argatroban. Transition to Eliquis 10mg BID for 6 more days then 5mg BID. Scopolamine patch ordered for nausea.    06/23/2025 Pt transitioned back to argatroban gtt per IR recommendations, PTT therapeutic overnight, went to IR this AM for IVC retrieval and clot removal -> coded in procedure, likely PE from piece of clot breaking off during attempted retrieval. Given IV tPA 50 mg in code, epi x4, ROSC achieved after ~12-15 minutes, please see significant event note for full details. Family updated extensively at bedside s/p code.   06/24/2025 Briefly restarted on argatroban gtt overnight, held 2/2 supratherapeutic PTT. Noted to have stimulus induced myoclonus on exam, hooked up to cEEG -> some epileptiform discharges on R, not time locked to clinical myoclonus. Loaded w/ keppra to prevent seizures given abnormal EEG. Non-responsive off sedation. UOP poor. Oxygenation improved, now on minimal vent settings. Family updated at bedside.   06/25/2025 Supratherapeutic PTT again overnight, CBC w/ drop in Hb to 6.3, given 1 u PRBC. CT C/A/P/thighs obtained to r/o internal bleeding, notably given R thigh markedly larger than L thigh on exam -> CT w/ worsening R pleural effusion, no active hemorrhage. IVFs held, UOP/creatinine improving. Started on pantoprazole 40 mg IV BID for ? Slow GIB, holding on GI consult given pt hemodynamically stable off vasopressor support, pending neuroprognostication s/p cardiac arrest. Holding argatroban gtt given persistent transfusion requirements.   06/26/2025 Extensive GOC  conversation held with family, see ACP note for full details. Continuing w/ current level of care pending family decision. Holding argatroban gtt given downtrending Hb    Interval History:  Please see hospital course above for full details    Review of Systems   Unable to perform ROS: Intubated     Objective:     Vitals:  Temp: 98.9 °F (37.2 °C)  Pulse: 106  Rhythm: sinus tachycardia  BP: 137/65  MAP (mmHg): 93  Resp: (!) 26  SpO2: 97 %  Oxygen Concentration (%): 40  Vent Mode: A/C  Set Rate: 16 BPM  Vt Set: 450 mL  PEEP/CPAP: 5 cmH20  Peak Airway Pressure: 14 cmH20  Mean Airway Pressure: 8 cmH20  Plateau Pressure: 0 cmH20    Temp  Min: 98.7 °F (37.1 °C)  Max: 100 °F (37.8 °C)  Pulse  Min: 101  Max: 118  BP  Min: 107/52  Max: 137/65  MAP (mmHg)  Min: 72  Max: 94  Resp  Min: 18  Max: 32  SpO2  Min: 97 %  Max: 100 %  Oxygen Concentration (%)  Min: 40  Max: 50    06/25 0701 - 06/26 0700  In: 1768.6 [I.V.:276.4]  Out: 935 [Urine:920; Drains:15]   Unmeasured Output  Unmeasured Urine Occurrence: 1  Unmeasured Stool Occurrence: 0        Physical Exam  General Appearance: Elderly gentleman resting in bed, off sedation, not in acute hemodynamic distress  Mental Status Exam: No response to tactile or noxious stimuli. Not tracking or regarding. Not following commands.   Cranial Nerves: Gaze midline, pupils 3->2 mm and sluggishly reactive b/l. +OCRs, +corneals to saline b/l. Absent gag, +cough.   Motor: Diffuse stimulus induced myoclonus, decreased in frequency compared to prior. No movement in b/l upper extremities, TF in b/l lower extremities   Sensory: No response to noxious x4 extremities  Coordination: Unable to assess  Vascular: S1/S2 of normal intensity, no S3/S4 appreciated, no murmurs appreciated  Lungs: Coarse breath sounds b/l, diminished at bases b/l   Abdomen: Soft, non-distended, non-tender, BS +  Extremities: worsening anasarca throughout, R thigh noticeably larger in diameter compared to L. Distal pulses  dopplerable b/l        Medications:  Continuous Scheduledartificial tears, 1 drop, TID  levETIRAcetam (Keppra) IV (PEDS and ADULTS), 750 mg, Q12H  pantoprazole, 40 mg, BID  piperacillin-tazobactam (Zosyn) IV (PEDS and ADULTS) (extended infusion is not appropriate), 4.5 g, Q8H  polyethylene glycol, 17 g, Daily  pravastatin, 40 mg, Daily  senna-docusate, 1 tablet, BID  silodosin, 4 mg, Daily    PRN0.9%  NaCl infusion (for blood administration), , Q24H PRN  acetaminophen, 650 mg, Q6H PRN  dextrose 50%, 12.5 g, PRN  fentaNYL, 25 mcg, Q1H PRN  glucagon (human recombinant), 1 mg, PRN  insulin aspart U-100, 1-10 Units, Q4H PRN  ondansetron, 4 mg, Q4H PRN  sodium chloride 0.9%, 10 mL, PRN      Today I personally reviewed pertinent imaging, laboratory results, notably:    Diet  Diet NPO  Diet NPO  TF at goal    Assessment/Plan:     Neuro  * ICH (intracerebral hemorrhage)  Multifocal IPH on 6/9 at OSH, likely in setting of therapeutic AC (on Eliquis) and multifocal cavernomas. 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 Started on AC w/ monitoring in NCC given recent IPH  IPH stable on repeat imaging, NSGY following, no surgical intervention at this time  Pt w/ PEA arrest on 6/23 2/2 massive PE, s/p IV tPA, now in acute DIC -> repeat head CT s/p cardiac arrest stable  Admit to [facility]  Q1h neuro checks, vitals, I/Os  Was previously on Eliquis for DVT/PE  Reversed earlier this month at [facility] and has not been restarted since  Pt is ARTEMIO HIT positive. Heparin gtt transitioned to argatroban gtt. Therapeutic CTH stable   PT/OT/SLP -> on hold due to critical illness  VTE Prophylaxis: mechanical, argatroban gtt on hold due concern for active bleed  S/p IVC filter placement and subsequent removal  on 6/23       Encephalopathy  2/2 cardiac arrest    Pulmonary  Pleural effusion  Noted on post-cardiac arrest imaging    - Worsening on repeat imaging, d/c IVFs  - Hold on diuresis given ZOE, pt on minimal vent settings; will give lasix PRN if pt w/ increase in oxygen requirements  - Sputum cx pending, MRSA screen negative -> c/w zosyn due to worsening fever curve and secretions    Acute respiratory failure with hypoxia  Patient with Hypoxic Respiratory failure which is Acute.  he is not on home oxygen. Supplemental oxygen was provided and noted- Vent Mode: A/C  Oxygen Concentration (%):  [40-50] 40  Resp Rate Total:  [19 br/min-33 br/min] 22 br/min  Vt Set:  [450 mL] 450 mL  PEEP/CPAP:  [5 cmH20] 5 cmH20  Mean Airway Pressure:  [7.6 cmH20-8.7 cmH20] 8 cmH20    .   Signs/symptoms of respiratory failure include- cyanosis and cardiac arrest 2/2 hypoxemia. Contributing diagnoses includes - Pulmonary Embolus Labs and images were reviewed. Patient Has recent ABG, which has been reviewed. Will treat underlying causes and adjust management of respiratory failure as follows- s/p IV tPA. Holding argatroban gtt pending stabilization of CBC.     - RLL lobe collapse on CT chest, concern for aspiration PNA - > on zosyn, continue given uptrending fever curve  - Hold chest PT given abnormal coags, risk of worsening retrosternal hemorrhage   - Now on minimal vent settings     On mechanically assisted ventilation  Intubated for acute hypoxemic respiratory failure 2/2 suspected massive PE on 6/23    - Initial ABG w/ hypercapnia and hypoxemic, improved on repeat   - Wean vent as tolerated for goal SpO2>90% -> currently on minimal vent settings, not a candidate for further vent wean 2/2 mental status   - Goal eucarbia  - S/p tPA for PE, argatroban gtt on hold for concern for active bleeding  - Daily CXR     Will need trach/PEG for ongoing supportive care if consistent w/ family decision re GOC    Cardiac/Vascular  Shock  Hypotensive  following PEA arrest on 6/23    - Suspect multifactorial shock, hemorrhagic +/- component of vasoplegia 2/2 cardiac arrest, may have component of cardiogenic 2/2 suspected massive PE  - TTE, ECG, trop reviewed  - Trend lactate q6hrs -> normalized, stop trending  - Wean levophed gtt as tolerated for goal MAP>65, SBP<160 -> off levophed/vasopressor support at this time    No evidence of infection prior to procedure, low clinical suspicion for septic shock at this time    Cardiac arrest  S/p PEA arrest on 6/23 during IR procedure for clot removal and IVC filter retrieval  ROSC achieved after ~12-15 minutes, s/p epi x4, IV tPA 50 mg x1  Etiology presumed PE     - Not a candidate for TTM given hypercoaguable state, strict normothermia  - cEEG w/ epileptiform discharges, not time locked to clinical myoclonus -> 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 Prognosis guarded. See ACP note 6/26 for details. Family decision re GOC pending.     Hyperlipidemia  History of  Statin on hold due to transaminitis  Lipid panel reviewed    Essential hypertension  History of  TTE, EKG reviewed  SBP < 160. MAP>65  Holding home meds given pt hypotensive s/p cardiac arrest    Renal/  ZOE (acute kidney injury)  ATN 2/2 cardiac arrest    - UOP 0.4 cc/kg/hr overnight, stable  - Creatinine downtrending to 1.7 this AM   - Avoid nephrotoxic medications  - Jack in place for strict I&Os    Close to baseline renal function. No indication for RRT at this time    Hematology  Thrombocytopenia  2/2 HIT    - Trending daily, improved      HIT (heparin-induced thrombocytopenia)  Noted at rehab facility prior to admission, HIT antibody+, WADE pending    - Briefly  on heparin gtt on 6/21, d/c'd s/p receiving notification from rehab facility of positive antibody testing  - WADE negative, however given high clinical suspicion, will c/w HIT precautions, discuss resending WADE w/ heme team   - Argatroban gtt -> on hold due to concern for active GIB    DIC (disseminated intravascular coagulation)  Lab values s/p cardiac arrest consistent w/ acute DIC, likely provoked in setting of HIT and cardiac arrest    - s/p IV tPA 50 mg x1 during arrest, given at 0952  - s/p cryo x1 for persistently low fibrinogen   - Coags remained variable overnight, very sensitive to restarting anticoagulation  - Monitor PT/INR, fibrinogen, CBC qday   - Transfuse PRBCs for Hb<7, platelet goal >20k    Deep vein thrombosis (DVT) of lower extremity  History of  Was previously on Eliquis for DVT/PE  BLE US at [facility] earlier this month revealed RLE popliteal DVT  CT abdomen pelvis with IV contrast at [facility] ED showed extension of DVT to the femoral vein with potential clot around the IVC filter  Pt is HIT positive.   Hematology consulted and recommending removal of the IVC filter given its prothrombotic risk -> IVC filter removed 6/23, unable to fully remove clot prior to patient coding  WADE negative-> c/w HIT precautions given high clinical suspicion. Discuss resending WADE w/ hematology team   Suspect DVT as etiology of RLE swelling, NTD at this time. At risk of post-thrombotic syndrome if survives current hospitalization  S/p IV tPA on 6/23 during cardiac arrest, argatroban gtt currently on hold due to recurrent transfusion requirement/concern for GIB   Monitor coags daily     Oncology  Acute on chronic anemia  Anemia of chronic illness present at admission    Worsening anemia s/p cardiac arrest, s/p 2 u PRBCs on 6/23, 1 u cryo 6/23, 1 u PRBCs 6/25    - CT C/A/P x2 and CT thigh w/ small retrosternal hematoma, no evidence of active hemorrhage   - ? GIB vs consumptive anemia given persistent transfusion requirement  -  C/w protonix IV BID  - Monitor CBC q8hrs, goal Hb>7  - Hold argatroban gtt today pending stabilization of coags and Hb    Pt currently hemodynamically stable off vasopressor support and with guarded neuroprognostication. Holding on GI consult pending family decision re [facility]    Prostate cancer  History of  Diagnosed in 2023, s/p radiation therapy, was previously on hormonal therapy (recently discontinued once admitted to [facility] earlier this month)  CT chest/abdomen/pelvis with hepatic and renal lesions, negative for other primary malignancy  Generalized muscle weakness due to cancer and hormonal therapy  Follows regularly with Oncology    Endocrine  Diabetes mellitus  History of  Hold home meds  SSI PRN  A1C 6.8%    GI  Transaminitis  Shock liver 2/2 cardiac arrest    - Downtrending  - Monitor daily  - Minimize hepatotoxic medications    Gastro-esophageal reflux disease without esophagitis  History of  On PPI BID in place of home famotidine given concern for GIB    Orthopedic  Right leg swelling  Noted on exam    - CT thigh w/o evidence of hematoma formation, does have R>L soft tissue edema  - Suspect RLE swelling 2/2 known R femoral and iliac DVT  - Monitor distal pulses -> currently dopplerable   - Management of DVT as able     Palliative Care  Goals of care, counseling/discussion  See ACP note dated 6/26 for full details          The patient is being Prophylaxed for:  Venous Thromboembolism with: Mechanical  Stress Ulcer with: PPI  Ventilator Pneumonia with: chlorhexidine oral care    Activity Orders            Elevate HOB Elevate HOB 30-45 degrees during feeding unless contraindicated starting at 06/24 0858    Diet NPO: NPO starting at 06/23 0001    Turn patient starting at 06/21 0600    Elevate HOB starting at 06/21 0552          Full Code    Uninterrupted Critical Care/Counseling Time (not including procedures): 74 minutes  There is high probability for acute neurological change leading to clinical and possibly  life-threatening deterioration requiring highest level of physician preparedness for urgent intervention. Critical care time was spent personally by me on the following activities: development of treatment plan with patient or surrogate and bedside caregivers, discussions with consultants, evaluation of patient's response to treatment, examination of patient, ordering and performing treatments and interventions, ordering and review of laboratory studies, ordering and review of radiographic studies, pulse oximetry, antibiotic titration if applicable, vasopressor titration if applicable, re-evaluation of patient's condition. I spent greater than 50% of the documented critical care time assessing and making medical decisions for this patient.       Anne Mabry MD  Neurocritical Care  [facility] - Neuro Critical Care

## 2025-06-27 NOTE — ASSESSMENT & PLAN NOTE
Patient with Hypoxic Respiratory failure which is Acute.  he is not on home oxygen. Supplemental oxygen was provided and noted- Vent Mode: A/C  Oxygen Concentration (%):  [40] 40  Resp Rate Total:  [17 br/min-26 br/min] 23 br/min  Vt Set:  [450 mL] 450 mL  PEEP/CPAP:  [5 cmH20] 5 cmH20  Mean Airway Pressure:  [6.8 cmH20-10 cmH20] 8.7 cmH20    .   Signs/symptoms of respiratory failure include- cyanosis and cardiac arrest 2/2 hypoxemia. Contributing diagnoses includes - Pulmonary Embolus Labs and images were reviewed. Patient Has recent ABG, which has been reviewed. Will treat underlying causes and adjust management of respiratory failure as follows- s/p IV tPA. Holding argatroban gtt pending stabilization of CBC.     - RLL lobe collapse on CT chest, concern for aspiration PNA - > on zosyn, continue pending palliative extubation per family request  - Hold chest PT given abnormal coags, risk of worsening retrosternal hemorrhage   - Now on minimal vent settings -> not a candidate for extubation due to mental status    Plan for palliative extubation on 6/28 pending arrival of additional family to bedside

## 2025-06-27 NOTE — ASSESSMENT & PLAN NOTE
Noted on post-cardiac arrest imaging    - Worsening on repeat imaging, d/c IVFs  - Hold on diuresis given ZOE, pt on minimal vent settings; will give lasix PRN if pt w/ increase in oxygen requirements  - Sputum cx pending, MRSA screen negative -> c/w zosyn due to worsening fever curve and secretions   - Family requesting continuation of zosyn pending planned palliative extubation on 6/28, will d/c at time of transition to comfort based care

## 2025-06-27 NOTE — ASSESSMENT & PLAN NOTE
See ACP notes dated 6/26 and 6/27 for full details    - Plan for palliative extubation w/ transition to comfort based care on 6/28  - D/c neuro checks and labs draws pending extubation, pt made DNR, no escalation of care  - C/w zosyn pending extubation per family request  - C/w keppra 750 mg BID given clinical myoclonus, pt at risk for clinical seizures     Possible transfer to [facility] s/p palliative extubation, formal consult pending clinical status s/p extubation. Family aware and in agreement if stable for transport s/p extubation

## 2025-06-27 NOTE — ASSESSMENT & PLAN NOTE
History of  Diagnosed in 2023, s/p radiation therapy, was previously on hormonal therapy (recently discontinued once admitted to [facility] earlier this month)  CT chest/abdomen/pelvis with hepatic and renal lesions, negative for other primary malignancy  Generalized muscle weakness due to cancer and hormonal therapy  Follows regularly with Oncology

## 2025-06-27 NOTE — ASSESSMENT & PLAN NOTE
History of  Diagnosed in 2023, s/p radiation therapy, was previously on hormonal therapy (recently discontinued once admitted to [facility] earlier this month)  CT chest/abdomen/pelvis with hepatic and renal lesions, negative for other primary malignancy  Generalized muscle weakness due to cancer and hormonal therapy  Follows regularly with Oncology

## 2025-06-27 NOTE — ASSESSMENT & PLAN NOTE
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 Prognosis guarded -> family electing to transition to comfort based care w/ plans for palliative extubation on 6/28, please see ACP notes from 6/26 and 6/27 for details. Will c/w keppra 750 mg BID s/p extubation given clinical myoclonus improved s/p keppra load

## 2025-06-27 NOTE — ASSESSMENT & PLAN NOTE
ATN 2/2 cardiac arrest    - Creatinine downtrending to 1.3 this AM, back to baseline  - Avoid nephrotoxic medications  - Jack in place for strict I&Os -> will maintain in place for patient comfort given GOC

## 2025-06-27 NOTE — ASSESSMENT & PLAN NOTE
Intubated for acute hypoxemic respiratory failure 2/2 suspected massive PE on 6/23    - Initial ABG w/ hypercapnia and hypoxemic, improved on repeat   - Wean vent as tolerated for goal SpO2>90% -> currently on minimal vent settings, not a candidate for further vent wean 2/2 mental status   - Goal eucarbia  - S/p tPA for PE, argatroban gtt on hold for concern for active bleeding  - Daily CXR     Will need trach/PEG for ongoing supportive care if consistent w/ family decision re GOC

## 2025-06-27 NOTE — SUBJECTIVE & OBJECTIVE
Interval History:  Please see hospital course above for full details    Review of Systems   Unable to perform ROS: Intubated     Objective:     Vitals:  Temp: 98.9 °F (37.2 °C)  Pulse: 106  Rhythm: sinus tachycardia  BP: 137/65  MAP (mmHg): 93  Resp: (!) 26  SpO2: 97 %  Oxygen Concentration (%): 40  Vent Mode: A/C  Set Rate: 16 BPM  Vt Set: 450 mL  PEEP/CPAP: 5 cmH20  Peak Airway Pressure: 14 cmH20  Mean Airway Pressure: 8 cmH20  Plateau Pressure: 0 cmH20    Temp  Min: 98.7 °F (37.1 °C)  Max: 100 °F (37.8 °C)  Pulse  Min: 101  Max: 118  BP  Min: 107/52  Max: 137/65  MAP (mmHg)  Min: 72  Max: 94  Resp  Min: 18  Max: 32  SpO2  Min: 97 %  Max: 100 %  Oxygen Concentration (%)  Min: 40  Max: 50    06/25 0701 - 06/26 0700  In: 1768.6 [I.V.:276.4]  Out: 935 [Urine:920; Drains:15]   Unmeasured Output  Unmeasured Urine Occurrence: 1  Unmeasured Stool Occurrence: 0        Physical Exam  General Appearance: Elderly gentleman resting in bed, off sedation, not in acute hemodynamic distress  Mental Status Exam: No response to tactile or noxious stimuli. Not tracking or regarding. Not following commands.   Cranial Nerves: Gaze midline, pupils 3->2 mm and sluggishly reactive b/l. +OCRs, +corneals to saline b/l. Absent gag, +cough.   Motor: Diffuse stimulus induced myoclonus, decreased in frequency compared to prior. No movement in b/l upper extremities, TF in b/l lower extremities   Sensory: No response to noxious x4 extremities  Coordination: Unable to assess  Vascular: S1/S2 of normal intensity, no S3/S4 appreciated, no murmurs appreciated  Lungs: Coarse breath sounds b/l, diminished at bases b/l   Abdomen: Soft, non-distended, non-tender, BS +  Extremities: worsening anasarca throughout, R thigh noticeably larger in diameter compared to L. Distal pulses dopplerable b/l        Medications:  Continuous Scheduledartificial tears, 1 drop, TID  levETIRAcetam (Keppra) IV (PEDS and ADULTS), 750 mg, Q12H  pantoprazole, 40 mg,  BID  piperacillin-tazobactam (Zosyn) IV (PEDS and ADULTS) (extended infusion is not appropriate), 4.5 g, Q8H  polyethylene glycol, 17 g, Daily  pravastatin, 40 mg, Daily  senna-docusate, 1 tablet, BID  silodosin, 4 mg, Daily    PRN0.9%  NaCl infusion (for blood administration), , Q24H PRN  acetaminophen, 650 mg, Q6H PRN  dextrose 50%, 12.5 g, PRN  fentaNYL, 25 mcg, Q1H PRN  glucagon (human recombinant), 1 mg, PRN  insulin aspart U-100, 1-10 Units, Q4H PRN  ondansetron, 4 mg, Q4H PRN  sodium chloride 0.9%, 10 mL, PRN      Today I personally reviewed pertinent imaging, laboratory results, notably:    Diet  Diet NPO  Diet NPO  TF at goal

## 2025-06-27 NOTE — ASSESSMENT & PLAN NOTE
Shock liver 2/2 cardiac arrest    - Resolved  - No longer trending given GOC  - Minimize hepatotoxic medications

## 2025-06-27 NOTE — ASSESSMENT & PLAN NOTE
History of  Was previously on Eliquis for DVT/PE  BLE US at [facility] earlier this month revealed RLE popliteal DVT  [place] [facility] [place] of DVT to the femoral vein with potential clot around the IVC filter  Pt is HIT positive.   Hematology consulted and recommending removal of the IVC filter given its prothrombotic risk -> IVC filter removed 6/23, unable to fully remove clot prior to patient coding  WADE negative-> c/w HIT precautions given high clinical suspicion. Discuss resending WADE w/ hematology team -> no indication at this time given [facility]  Suspect DVT as etiology of RLE swelling, NTD at this time. At risk of post-thrombotic syndrome if survives current hospitalization  S/p IV tPA on 6/23 during cardiac arrest, argatroban gtt currently on hold due to recurrent transfusion requirement/concern for GIB   Monitor coags daily -> d/c given [facility]

## 2025-06-27 NOTE — ASSESSMENT & PLAN NOTE
Noted on post-cardiac arrest imaging    - Worsening on repeat imaging, d/c IVFs  - Hold on diuresis given ZOE, pt on minimal vent settings; will give lasix PRN if pt w/ increase in oxygen requirements  - Sputum cx pending, MRSA screen negative -> c/w zosyn due to worsening fever curve and secretions

## 2025-06-27 NOTE — ASSESSMENT & PLAN NOTE
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 Prognosis guarded. See ACP note 6/26 for details. Family decision re GOC pending.

## 2025-06-27 NOTE — ASSESSMENT & PLAN NOTE
History of  Was previously on Eliquis for DVT/PE  BLE US at [facility] earlier this month revealed RLE popliteal DVT  CT abdomen pelvis with IV contrast [place] [facility] showed extension of DVT to the femoral vein with potential clot around the IVC filter  Pt is HIT positive.   Hematology consulted and recommending removal of the IVC filter given its prothrombotic risk -> IVC filter removed 6/23, unable to fully remove clot prior to patient coding  WADE negative-> c/w HIT precautions given high clinical suspicion. Discuss resending WADE w/ hematology team   Suspect DVT as etiology of RLE swelling, NTD at this time. At risk of post-thrombotic syndrome if survives current hospitalization  S/p IV tPA on 6/23 during cardiac arrest, argatroban gtt currently on hold due to recurrent transfusion requirement/concern for GIB   Monitor coags daily

## 2025-06-27 NOTE — PLAN OF CARE
06/27/25 1333   Rounds   Attendance Provider;   Discharge Plan A Comfort care/withdrawal   Why the patient remains in the hospital Requires continued medical care   Transition of Care Barriers None     Discharge Plan A and Plan B have been determined by review of patient's clinical status, future medical and therapeutic needs, and coverage/benefits for post-acute care in coordination with multidisciplinary team members.    SW met with provider. Pt planned for palliative extubation tomorrow 6/28. Pt not med ready for d/c at this time. SW will continue to follow for d/c needs.     TR Burns, LMSW  [phone]  [facility]  Case Management

## 2025-06-27 NOTE — ACP (ADVANCE CARE PLANNING)
[facility]  I engaged the family in a voluntary conversation about advance care planning and we specifically addressed what the goals of care would be moving forward, in light of extensive [facility] discussion on 6/26 with extended family. Family electing to proceed w/ palliative extubation on 6/28 (awaiting arrival of grandson and sister to bedside). Pending arrival of family, patient to be made DNR, no escalation of care, with family aware and accepting that he may pass overnight prior to palliative extubation. At family request will c/w abx pending extubation, however will d/c neuro checks and lab draws. Plan to start formal comfort medications tomorrow at time of extubation. Did already discuss potential transfer to [facility] if stable s/p extubation w/ family, will notify palliative care of potential weekend admission, formal consult pending pt status post-extubation.      A total of 20 min was spent on advance care planning, goals of care discussion, emotional support, formulating and communicating prognosis and exploring burden/benefit of various approaches of treatment. This discussion occurred on a fully voluntary basis with the verbal consent of the family.     Anne Mabry MD, MPH  Neurocritical Care Physician

## 2025-06-27 NOTE — ASSESSMENT & PLAN NOTE
Multifocal IPH on 6/9 at OSH, likely in setting of therapeutic AC (on Eliquis) and multifocal cavernomas. 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 Started on AC w/ monitoring in NCC given recent IPH  IPH stable on repeat imaging, NSGY following, no surgical intervention at this time  Pt w/ PEA arrest on 6/23 2/2 massive PE, s/p IV tPA, now in acute DIC -> repeat head CT s/p cardiac arrest stable  Admit to [facility]  Was previously on Eliquis for DVT/PE  Reversed earlier this month at [facility] and has not been restarted since  Pt is ARTEMIO HIT positive. Heparin gtt transitioned to argatroban gtt. Therapeutic CTH stable   PT/OT/SLP -> on hold due to critical illness  VTE Prophylaxis: mechanical, argatroban gtt on hold due concern for active bleed/GOC  S/p IVC filter placement and subsequent removal on 6/23    Plan to transition to comfort based care on 6/28 due to HIE

## 2025-06-27 NOTE — PLAN OF CARE
[facility] - Neuro Critical Care  Discharge Reassessment    Primary Care Provider: No, Primary Doctor    Expected Discharge Date: 6/30/2025    Reassessment (most recent)       Discharge Reassessment - 06/27/25 1444          Discharge Reassessment    Assessment Type Discharge Planning Reassessment     Did the patient's condition or plan change since previous assessment? No     Communicated NAMRATA with patient/caregiver Date not available/Unable to determine;No     Discharge Plan A Comfort care/withdrawal     Discharge Plan B Home;Home with family     DME Needed Upon Discharge  none     Transition of Care Barriers None     Why the patient remains in the hospital Requires continued medical care        Post-Acute Status    Post-Acute Authorization Other (P)    TBD    Discharge Delays None known at this time (P)                    Discharge Plan A and Plan B have been determined by review of patient's clinical status, future medical and therapeutic needs, and coverage/benefits for post-acute care in coordination with multidisciplinary team members.    Pt having GOC meetings with palliative. D/C dispo pending. SW will continue to follow for additional d/c needs.    TR Burns, LMSW  [phone]  [facility]  Case Management

## 2025-06-27 NOTE — ASSESSMENT & PLAN NOTE
Anemia of chronic illness present at admission    Worsening anemia s/p cardiac arrest, s/p 2 u PRBCs on 6/23, 1 u cryo 6/23, 1 u PRBCs 6/25, 1 u PRBCs 6/26    - CT C/A/P x2 and CT thigh w/ small retrosternal hematoma, no evidence of active hemorrhage   - ? GIB vs consumptive anemia given persistent transfusion requirement    Pt currently hemodynamically stable off vasopressor support and with guarded neuroprognostication. Holding on GI consult pending family decision re GOC -> family electing to transition to comfort based care. D/c lab draws, no plans for further blood transfusions or interventions at this time

## 2025-06-28 PROBLEM — Z51.5 COMFORT MEASURES ONLY STATUS: Status: ACTIVE | Noted: 2025-06-28

## 2025-06-28 NOTE — ASSESSMENT & PLAN NOTE
Multifocal IPH on 6/9 at OSH, likely in setting of therapeutic AC (on Eliquis) and multifocal cavernomas. 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 Started on AC w/ monitoring in NCC given recent IPH  IPH stable on repeat imaging, NSGY following, no surgical intervention at this time  Pt w/ PEA arrest on 6/23 2/2 massive PE, s/p IV tPA, now in acute DIC -> repeat head CT s/p cardiac arrest stable  Admit to [facility]  Was previously on Eliquis for DVT/PE  Reversed earlier this month at [facility] and has not been restarted since  Pt is ARTEMIO HIT positive. Heparin gtt transitioned to argatroban gtt. Therapeutic CTH stable   PT/OT/SLP -> on hold due to critical illness/GOC  VTE Prophylaxis: mechanical, argatroban gtt on hold due concern for active bleed/GOC  S/p IVC filter placement and subsequent removal on 6/23    Plan to transition to comfort based care on 6/28 due to HIE

## 2025-06-28 NOTE — ASSESSMENT & PLAN NOTE
History of  Was previously on Eliquis for DVT/PE  BLE US at [facility] earlier this month revealed RLE popliteal DVT  CT abdomen pelvis with IV contrast at [facility] showed extension of DVT to the femoral vein with potential clot around the IVC filter  Pt is HIT positive.   Hematology consulted and recommending removal of the IVC filter given its prothrombotic risk -> IVC filter removed 6/23, unable to fully remove clot prior to patient coding  WADE negative-> c/w HIT precautions given high clinical suspicion. Discuss resending WADE w/ hematology team -> no indication at this time given [facility]  Suspect DVT as etiology of RLE swelling, NTD at this time. At risk of post-thrombotic syndrome if survives current hospitalization  S/p IV tPA on 6/23 during cardiac arrest, argatroban gtt currently on hold due to recurrent transfusion requirement/concern for GIB   Monitor coags daily -> d/c given [facility]

## 2025-06-28 NOTE — EICU
JASON Night Rounds Checklist  24H Vital Sign Range:  Temp:  [97.9 °F (36.6 °C)-100 °F (37.8 °C)]   Pulse:  []   Resp:  [15-24]   BP: (119-178)/(56-77)   SpO2:  [93 %-99 %]   Arterial Line BP: (110-170)/(51-62)     Video rounds

## 2025-06-28 NOTE — ASSESSMENT & PLAN NOTE
Patient with Hypoxic Respiratory failure which is Acute.  he is not on home oxygen. Supplemental oxygen was provided and noted- Vent Mode: A/C  Oxygen Concentration (%):  [40] 40  Resp Rate Total:  [16 br/min-26 br/min] 18 br/min  Vt Set:  [450 mL] 450 mL  PEEP/CPAP:  [5 cmH20] 5 cmH20  Mean Airway Pressure:  [6.5 hsY45-32 cmH20] 18 cmH20    .   Signs/symptoms of respiratory failure include- cyanosis and cardiac arrest 2/2 hypoxemia. Contributing diagnoses includes - Pulmonary Embolus Labs and images were reviewed. Patient Has recent ABG, which has been reviewed. Will treat underlying causes and adjust management of respiratory failure as follows- s/p IV tPA. Holding argatroban gtt pending stabilization of CBC.     - RLL lobe collapse on CT chest, concern for aspiration PNA - > on zosyn, continue pending palliative extubation per family request  - Hold chest PT given abnormal coags, risk of worsening retrosternal hemorrhage   - Now on minimal vent settings -> not a candidate for extubation due to mental status    Plan for palliative extubation on 6/28 pending arrival of additional family to bedside

## 2025-06-28 NOTE — EICU
Virtual ICU Quality Rounds    Admit Date: 6/20/2025  Hospital Day: 7    ICU Day: 7d 6h    24H Vital Sign Range:  Temp:  [98.4 °F (36.9 °C)-100 °F (37.8 °C)]   Pulse:  []   Resp:  [0-26]   BP: (110-178)/(55-79)   SpO2:  [95 %-100 %]   Arterial Line BP: (141-164)/(56-62)     VICU Surveillance Screening    Daily review of  line necessity(optional): Urinary catheter in place    Jack Indications : Hospice/comfort care/palliative care, if requested by the family    LDA reconciliation : Yes

## 2025-06-28 NOTE — PROGRESS NOTES
[facility] - Neuro Critical Care  Neurocritical Care  Progress Note    Admit Date: 6/20/2025  Service Date: 06/28/2025  Length of Stay: 7    Subjective:     Chief Complaint: ICH (intracerebral hemorrhage)    History of Present Illness: 80 y/o M with a PMH of prostate CA (diagnosed 2023 s/p radiation), PE (diagnosed in Feb of 2024, was previously on Eliquis until recent IPH), RLE DVT, HTN, and HLD who presents to [facility] ED from [facility] with increased nausea/vomiting. He initially was admitted to [facility] w/ R sided numbness/tingling and dizziness for two days prior. He initially reported the numbness started on the R side of his face and had extended to his R hand and R side of his abdomen without any RLE numbness/tingling. 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 Per chart review, he had minimal dysmetria in the RUE while at [facility], but otherwise had no focal deficits. MRI with and without contrast was brain negative for any mass. An IVC filter was placed, and his oral AC was not resumed. He was transferred to a rehab facility, and was started on SQH for DVT PPX. He then transferred to [facility] ED for complaints of increased nausea, vomiting, and a headache. On arrival to [facility] ED, he was neuro intact. A CT head revealed similar appearance of previously known lesions, and new, small supratentorial lesions were noted. There was a CT abdomen with IV contrast that was completed for malignancy evaluation which was negative for malignancy but did reveal lower extremity DVT extending up to the femoral vein. Apparent filling defect in the IVC above and surrounding the IVC filter could represent thrombus or mixing of IV contrast. BUE and BLE US pending. MRI with and without also pending. NCC was consulted for IPH. Due tot he need for anticoagulation therapy due to DVT and potential IVC filter thrombosis, he will  be [place] [facility] [place] and higher level of care.    Hospital Course: 06/22/2025 Pt is HIT positive. Heparin gtt discontinued and started on argatroban yesterday. Therapeutic CTH stable. WADE pending. Plt improved to 90. Hematology consulted and recommending removal of the IVC filter given its prothrombotic risk. IR was consulted for evaluation. ZOE improved, Cr 1.5- discontinue IVF. Stable CTH on therapeutic argatroban. Transition to Eliquis 10mg BID for 6 more days then 5mg BID. Scopolamine patch ordered for nausea.    06/23/2025 Pt transitioned back to argatroban gtt per IR recommendations, PTT therapeutic overnight, went to IR this AM for IVC retrieval and clot removal -> coded in procedure, likely PE from piece of clot breaking off during attempted retrieval. Given IV tPA 50 mg in code, epi x4, ROSC achieved after ~12-15 minutes, please see significant event note for full details. Family updated extensively at bedside s/p code.   06/24/2025 Briefly restarted on argatroban gtt overnight, held 2/2 supratherapeutic PTT. Noted to have stimulus induced myoclonus on exam, hooked up to cEEG -> some epileptiform discharges on R, not time locked to clinical myoclonus. Loaded w/ keppra to prevent seizures given abnormal EEG. Non-responsive off sedation. UOP poor. Oxygenation improved, now on minimal vent settings. Family updated at bedside.   06/25/2025 Supratherapeutic PTT again overnight, CBC w/ drop in Hb to 6.3, given 1 u PRBC. CT C/A/P/thighs obtained to r/o internal bleeding, notably given R thigh markedly larger than L thigh on exam -> CT w/ worsening R pleural effusion, no active hemorrhage. IVFs held, UOP/creatinine improving. Started on pantoprazole 40 mg IV BID for ? Slow GIB, holding on GI consult given pt hemodynamically stable off vasopressor support, pending neuroprognostication s/p cardiac arrest. Holding argatroban gtt given persistent transfusion requirements.   06/26/2025 Extensive GOC  conversation held with family, see ACP note for full details. Continuing w/ current level of care pending family decision. Holding argatroban gtt given downtrending Hb  06/27/2025 Received 1 u PRBCs overnight for Hb 6.7, improved to 7.5 this AM. Neuro exam unchanged. Family electing to transition to comfort based care w/ palliative extubation on 6/28, made DNR/no escalation of care w/ discontinuation of lab draws and neuro checks pending palliative extubation   06/28/2025 NAEON. Pending palliative extubation this evening s/p arrival of additional family to bedside    Interval History:  Please see hospital course above for full details    Review of Systems   Unable to perform ROS: Intubated       Objective:     Vitals:  Temp: 98.4 °F (36.9 °C)  Pulse: 85  Rhythm: normal sinus rhythm  BP: 132/79  MAP (mmHg): 100  Resp: 20  SpO2: 97 %  Oxygen Concentration (%): 40  Vent Mode: A/C  Set Rate: 16 BPM  Vt Set: 450 mL  PEEP/CPAP: 5 cmH20  Peak Airway Pressure: 51 cmH20  Mean Airway Pressure: 18 cmH20  Plateau Pressure: 0 cmH20    Temp  Min: 98.4 °F (36.9 °C)  Max: 100 °F (37.8 °C)  Pulse  Min: 73  Max: 101  BP  Min: 119/56  Max: 178/77  MAP (mmHg)  Min: 81  Max: 111  Resp  Min: 0  Max: 26  SpO2  Min: 94 %  Max: 100 %  Oxygen Concentration (%)  Min: 40  Max: 40    06/27 0701 - 06/28 0700  In: 1079.1 [I.V.:30]  Out: 895 [Urine:895]   Unmeasured Output  Unmeasured Urine Occurrence: 1  Unmeasured Stool Occurrence: 0        Physical Exam  Elderly gentleman resting in bed, intubated, off sedation. No response to stimuli. Reminder of exam deferred given GOC        Medications:  Continuous ScheduledlevETIRAcetam (Keppra) IV (PEDS and ADULTS), 750 mg, Q12H  scopolamine, 1 patch, Q3 Days  white petrolatum-mineral oiL, , Q8H    PRNacetaminophen, 650 mg, Q6H PRN  glycopyrrolate (PF), 0.2 mg, TID PRN  lorazepam, 1 mg, Q4H PRN  morphine, 2 mg, Q1H PRN  ondansetron, 4 mg, Q4H PRN  sodium chloride 0.9%, 10 mL, PRN      Today I personally  reviewed pertinent medications, imaging, laboratory results, notably: No new imaging or lab results to review. Medications transitioned to comfort based medications    Diet  Diet NPO  Diet NPO  NPO for palliative extubation    Assessment/Plan:     Neuro  * ICH (intracerebral hemorrhage)  Multifocal IPH on 6/9 at OSH, likely in setting of therapeutic AC (on Eliquis) and multifocal cavernomas. 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 Started on AC w/ monitoring in NCC given recent IPH  IPH stable on repeat imaging, NSGY following, no surgical intervention at this time  Pt w/ PEA arrest on 6/23 2/2 massive PE, s/p IV tPA, now in acute DIC -> repeat head CT s/p cardiac arrest stable  Admit to [facility]  Was previously on Eliquis for DVT/PE  Reversed earlier this month at [facility] and has not been restarted since  Pt is ARTEMIO HIT positive. Heparin gtt transitioned to argatroban gtt. Therapeutic CTH stable   PT/OT/SLP -> on hold due to critical illness/GOC  VTE Prophylaxis: mechanical, argatroban gtt on hold due concern for active bleed/GOC  S/p IVC filter placement and subsequent removal on 6/23    Plan to transition to comfort based care on 6/28 due to HIE       Severe hypoxic-ischemic encephalopathy  2/2 cardiac arrest on 6/23    Encephalopathy  2/2 cardiac arrest    Pulmonary  Acute respiratory failure with hypoxia  Patient with Hypoxic Respiratory failure which is Acute.  he is not on home oxygen. Supplemental oxygen was provided and noted- Vent Mode: A/C  Oxygen Concentration (%):  [40] 40  Resp Rate Total:  [16 br/min-26 br/min] 18 br/min  Vt Set:  [450 mL] 450 mL  PEEP/CPAP:  [5 cmH20] 5 cmH20  Mean Airway Pressure:  [6.5 sdF61-62 cmH20] 18 cmH20    .   Signs/symptoms of  respiratory failure include- cyanosis and cardiac arrest 2/2 hypoxemia. Contributing diagnoses includes - Pulmonary Embolus Labs and images were reviewed. Patient Has recent ABG, which has been reviewed. Will treat underlying causes and adjust management of respiratory failure as follows- s/p IV tPA. Holding argatroban gtt pending stabilization of CBC.     - RLL lobe collapse on CT chest, concern for aspiration PNA - > on zosyn, continue pending palliative extubation per family request  - Hold chest PT given abnormal coags, risk of worsening retrosternal hemorrhage   - Now on minimal vent settings -> not a candidate for extubation due to mental status    Plan for palliative extubation on 6/28 pending arrival of additional family to bedside    On mechanically assisted ventilation  Intubated for acute hypoxemic respiratory failure 2/2 suspected massive PE on 6/23    - Initial ABG w/ hypercapnia and hypoxemic, improved on repeat   - Wean vent as tolerated for goal SpO2>90% -> currently on minimal vent settings, not a candidate for further vent wean 2/2 mental status   - Goal eucarbia  - S/p tPA for PE, argatroban gtt on hold for concern for active bleeding    Will need trach/PEG for ongoing supportive care if consistent w/ family decision re GOC -> family electing to transition to comfort based care w/ palliative extubation on 6/28     Cardiac/Vascular  Cardiac arrest  S/p PEA arrest on 6/23 during IR procedure for clot removal and IVC filter retrieval  ROSC achieved after ~12-15 minutes, s/p epi x4, IV tPA 50 mg x1  Etiology presumed PE     - Not a candidate for TTM given hypercoaguable state, strict normothermia  - cEEG w/ epileptiform discharges, not time locked to clinical myoclonus -> 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 Prognosis guarded -> family electing to transition to comfort based care w/ plans for palliative extubation on 6/28, please see ACP notes from 6/26 and 6/27 for details. Will c/w keppra 750 mg BID s/p extubation given clinical myoclonus improved s/p keppra load    Hematology  Deep vein thrombosis (DVT) of lower extremity  History of  Was previously on Eliquis for DVT/PE  BLE US at [facility] earlier this month revealed RLE popliteal DVT  CT abdomen pelvis with IV contrast at [facility] showed extension of DVT to the femoral vein with potential clot around the IVC filter  Pt is HIT positive.   Hematology consulted and recommending removal of the IVC filter given its prothrombotic risk -> IVC filter removed 6/23, unable to fully remove clot prior to patient coding  WADE negative-> c/w HIT precautions given high clinical suspicion. Discuss resending WADE w/ hematology team -> no indication at this time given [facility]  Suspect DVT as etiology of RLE swelling, NTD at this time. At risk of post-thrombotic syndrome if survives current hospitalization  S/p IV tPA on 6/23 during cardiac arrest, argatroban gtt currently on hold due to recurrent transfusion requirement/concern for GIB   Monitor coags daily -> d/c given [facility]    Orthopedic  Right leg swelling  Noted on exam    - CT thigh w/o evidence of hematoma formation, does have R>L soft tissue edema  - Suspect RLE swelling 2/2 known R femoral and iliac DVT  - Monitor distal pulses -> currently dopplerable   - Management of DVT as able -> no plans to resume AC at this time given recurrent bleeding, [facility]    Palliative Care  Comfort measures only status  2/2 HIE w/ poor long term prognosis    - NPO for palliative extubation this evening s/p arrival of additional family to bedside  - Morphine PRN for air hunger, start gtt if needing frequent pushes  - Robinul PRN w/ scopolamine patch  for secretions    Will monitor in NCC overnight s/p palliative extubation, transfer to HPU in AM if stable for transport    Goals of care, counseling/discussion  See ACP notes dated 6/26 and 6/27 for full details    - Plan for palliative extubation w/ transition to comfort based care on 6/28  - D/c neuro checks and labs draws pending extubation, pt made DNR, no escalation of care  - C/w zosyn pending extubation per family request -> d/c  - C/w keppra 750 mg BID given clinical myoclonus, pt at risk for clinical seizures     Possible transfer to HPU s/p palliative extubation, formal consult pending clinical status s/p extubation. Family aware and in agreement if stable for transport s/p extubation           The patient is being Prophylaxed for:  Venous Thromboembolism with: None  Stress Ulcer with: None  Ventilator Pneumonia with: none    Activity Orders            Elevate HOB Elevate HOB 30-45 degrees during feeding unless contraindicated starting at 06/24 0858    Diet NPO: NPO starting at 06/23 0001    Turn patient starting at 06/21 0600    Elevate HOB starting at 06/21 0552          DNR - comfort based care    Level III visit    Anne Mabry MD  Neurocritical Care  [facility] - Neuro Critical Care

## 2025-06-28 NOTE — SUBJECTIVE & OBJECTIVE
Interval History:  Please see hospital course above for full details    Review of Systems   Unable to perform ROS: Intubated       Objective:     Vitals:  Temp: 98.4 °F (36.9 °C)  Pulse: 85  Rhythm: normal sinus rhythm  BP: 132/79  MAP (mmHg): 100  Resp: 20  SpO2: 97 %  Oxygen Concentration (%): 40  Vent Mode: A/C  Set Rate: 16 BPM  Vt Set: 450 mL  PEEP/CPAP: 5 cmH20  Peak Airway Pressure: 51 cmH20  Mean Airway Pressure: 18 cmH20  Plateau Pressure: 0 cmH20    Temp  Min: 98.4 °F (36.9 °C)  Max: 100 °F (37.8 °C)  Pulse  Min: 73  Max: 101  BP  Min: 119/56  Max: 178/77  MAP (mmHg)  Min: 81  Max: 111  Resp  Min: 0  Max: 26  SpO2  Min: 94 %  Max: 100 %  Oxygen Concentration (%)  Min: 40  Max: 40    06/27 0701 - 06/28 0700  In: 1079.1 [I.V.:30]  Out: 895 [Urine:895]   Unmeasured Output  Unmeasured Urine Occurrence: 1  Unmeasured Stool Occurrence: 0        Physical Exam  Elderly gentleman resting in bed, intubated, off sedation. No response to stimuli. Reminder of exam deferred given GOC        Medications:  Continuous ScheduledlevETIRAcetam (Keppra) IV (PEDS and ADULTS), 750 mg, Q12H  scopolamine, 1 patch, Q3 Days  white petrolatum-mineral oiL, , Q8H    PRNacetaminophen, 650 mg, Q6H PRN  glycopyrrolate (PF), 0.2 mg, TID PRN  lorazepam, 1 mg, Q4H PRN  morphine, 2 mg, Q1H PRN  ondansetron, 4 mg, Q4H PRN  sodium chloride 0.9%, 10 mL, PRN      Today I personally reviewed pertinent medications, imaging, laboratory results, notably: No new imaging or lab results to review. Medications transitioned to comfort based medications    Diet  Diet NPO  Diet NPO  NPO for palliative extubation

## 2025-06-28 NOTE — ASSESSMENT & PLAN NOTE
See ACP notes dated 6/26 and 6/27 for full details    - Plan for palliative extubation w/ transition to comfort based care on 6/28  - D/c neuro checks and labs draws pending extubation, pt made DNR, no escalation of care  - C/w zosyn pending extubation per family request -> d/c  - C/w keppra 750 mg BID given clinical myoclonus, pt at risk for clinical seizures     Possible transfer to HPU s/p palliative extubation, formal consult pending clinical status s/p extubation. Family aware and in agreement if stable for transport s/p extubation

## 2025-06-28 NOTE — ASSESSMENT & PLAN NOTE
Intubated for acute hypoxemic respiratory failure 2/2 suspected massive PE on 6/23    - Initial ABG w/ hypercapnia and hypoxemic, improved on repeat   - Wean vent as tolerated for goal SpO2>90% -> currently on minimal vent settings, not a candidate for further vent wean 2/2 mental status   - Goal eucarbia  - S/p tPA for PE, argatroban gtt on hold for concern for active bleeding    Will need trach/PEG for ongoing supportive care if consistent w/ family decision re GOC -> family electing to transition to comfort based care w/ palliative extubation on 6/28

## 2025-06-28 NOTE — PLAN OF CARE
[facility] Care Plan  POC reviewed with Franko Smith and family at 1700. Family (brother at bedside)  verbalized understanding. Questions and concerns addressed. No acute events today. Pt progressing toward goals. Will continue to monitor. See below and flowsheets for full assessment and VS info.     - Comfort care measures today per family request  - Will compassionately extubate when family is ready and all family members have had their time with Mr. Smith.   - Support provided to multiple members at bedside today.  -  notified of planned extubation this evening  - Patient turned and repositioned every 2 hours         Is this a stroke patient? yes- Stroke booklet reviewed with family and is at bedside.   Care Plan Individualization:     Neuro:  Vinicio Coma Scale  Best Eye Response: 1-->(E1) none  Best Motor Response: 3-->(M3) flexion to pain  Best Verbal Response: 1-->(V1) none  [place] Coma Scale Score: 5  Assessment Qualifiers: Patient intubated  Pupil PERRLA: yes     24 hr Temp:  [98.4 °F (36.9 °C)-100 °F (37.8 °C)]     CV:   Rhythm: normal sinus rhythm  BP goals:   SBP < 160  MAP > 65    Resp:      Vent Mode: A/C  Set Rate: 16 BPM  Oxygen Concentration (%): 40  Vt Set: 450 mL  PEEP/CPAP: 5 cmH20    Plan: N/A    GI/:     Diet/Nutrition Received: NPO  Last Bowel Movement: 06/24/25  Voiding Characteristics: urethral catheter (bladder)    Intake/Output Summary (Last 24 hours) at 6/28/2025 1801  Last data filed at 6/28/2025 1350  Gross per 24 hour   Intake 295.33 ml   Output 825 ml   Net -529.67 ml     Unmeasured Output  Unmeasured Urine Occurrence: 1  Unmeasured Stool Occurrence: 0    Labs/Accuchecks:  Recent Labs   Lab 06/27/25  0709   WBC 6.00   RBC 2.60*   HGB 7.5*   HCT 23.2*         Recent Labs   Lab 06/27/25  0709      K 4.0   CO2 21*      BUN 22   CREATININE 1.3   ALKPHOS 87   ALT 36   AST 42   BILITOT 1.1*      Recent Labs   Lab 06/27/25  0036   PROTIME 11.4   INR 1.0   APTT  "32.4*    No results for input(s): "CPK", "CPKMB", "TROPONINI", "MB" in the last 168 hours.    Electrolytes: N/A - electrolytes WDL  Accuchecks: none    Gtts:      LDA/Wounds:    Johnathan Risk Assessment  Sensory Perception: 1-->completely limited  Moisture: 4-->rarely moist  Activity: 1-->bedfast  Mobility: 1-->completely immobile  Nutrition: 2-->probably inadequate  Friction and Shear: 2-->potential problem  Johnathan Score: 11    Is your johnathan score 12 or less?             Restraints:        WCTM    "

## 2025-06-28 NOTE — ASSESSMENT & PLAN NOTE
2/2 HIE w/ poor long term prognosis    - NPO for palliative extubation this evening s/p arrival of additional family to bedside  - Morphine PRN for air hunger, start gtt if needing frequent pushes  - Robinul PRN w/ scopolamine patch for secretions    Will monitor in NCC overnight s/p palliative extubation, transfer to HPU in AM if stable for transport

## 2025-06-29 PROBLEM — Z51.5 PALLIATIVE CARE ENCOUNTER: Status: ACTIVE | Noted: 2025-01-01

## 2025-06-29 NOTE — ASSESSMENT & PLAN NOTE
2/2 HIE w/ poor long term prognosis    - S/p palliative extubation on 6/28  - Morphine and ativan PRN for air hunger, start gtt if needing frequent pushes  - Robinul PRN w/ scopolamine patch for secretions    Stable for transfer to HPU for ongoing EOL care pending bed availability

## 2025-06-29 NOTE — PLAN OF CARE
Problem: Adult Inpatient Plan of Care  Goal: Plan of Care Review  Outcome: Progressing  Goal: Patient-Specific Goal (Individualized)  Outcome: Progressing  Goal: Absence of Hospital-Acquired Illness or Injury  Outcome: Progressing  Goal: Optimal Comfort and Wellbeing  Outcome: Progressing  Goal: Readiness for Transition of Care  Outcome: Progressing     Problem: Stroke, Intracerebral Hemorrhage  Goal: Optimal Coping  Outcome: Progressing  Goal: Effective Bowel Elimination  Outcome: Progressing  Goal: Optimal Cerebral Tissue Perfusion  Outcome: Progressing  Goal: Optimal Cognitive Function  Outcome: Progressing  Goal: Effective Communication Skills  Outcome: Progressing  Goal: Optimal Pain Control and Function  Outcome: Progressing  Goal: Improved Sensorimotor Function  Outcome: Progressing     Problem: Fall Injury Risk  Goal: Absence of Fall and Fall-Related Injury  Outcome: Progressing     Problem: Skin Injury Risk Increased  Goal: Skin Health and Integrity  Outcome: Progressing     Problem: Wound  Goal: Optimal Coping  Outcome: Progressing  Goal: Optimal Functional Ability  Outcome: Progressing  Goal: Absence of Infection Signs and Symptoms  Outcome: Progressing  Goal: Optimal Pain Control and Function  Outcome: Progressing  Goal: Skin Health and Integrity  Outcome: Progressing  Goal: Optimal Wound Healing  Outcome: Progressing     Problem: Infection  Goal: Absence of Infection Signs and Symptoms  Outcome: Progressing     Problem: Sepsis/Septic Shock  Goal: Optimal Coping  Outcome: Progressing  Goal: Absence of Bleeding  Outcome: Progressing  Goal: Absence of Infection Signs and Symptoms  Outcome: Progressing     Problem: Delirium  Goal: Optimal Coping  Outcome: Progressing  Goal: Improved Behavioral Control  Outcome: Progressing  Goal: Improved Attention and Thought Clarity  Outcome: Progressing  Goal: Improved Sleep  Outcome: Progressing     Problem: Palliative Care  Goal: Enhanced Quality of Life  Outcome:  Progressing     Problem: Coping Ineffective  Goal: Effective Coping  Outcome: Progressing

## 2025-06-29 NOTE — NURSING
Arrives on unit in ICU bed accompanied by nurse and grandson.  Patient with zero indication of discomfort.  Oriented family to unit.

## 2025-06-29 NOTE — EICU
JASON Night Rounds Checklist  24H Vital Sign Range:  Temp:  [98.4 °F (36.9 °C)-99.4 °F (37.4 °C)]   Pulse:  []   Resp:  [0-26]   BP: (110-153)/(55-79)   SpO2:  [88 %-100 %]     Video rounds

## 2025-06-29 NOTE — CONSULTS
Consult acknowledged. Patient transferred to HPU for EOL care and consideration of inpatient hospice. See progress note from same date for assessment and plan.       Mynor Schultz M.D.  Department of Hospice and Palliative Medicine  [facility]

## 2025-06-29 NOTE — PROGRESS NOTES
[facility] - Neuro Critical Care  Neurocritical Care  Progress Note    Admit Date: 6/20/2025  Service Date: 06/29/2025  Length of Stay: 8    Subjective:     Chief Complaint: ICH (intracerebral hemorrhage)    History of Present Illness: 80 y/o M with a PMH of prostate CA (diagnosed 2023 s/p radiation), PE (diagnosed in Feb of 2024, was previously on Eliquis until recent IPH), RLE DVT, HTN, and HLD who presents to [facility] from [facility] with increased nausea/vomiting. He initially was admitted to [facility] w/ R sided numbness/tingling and dizziness for two days prior. He initially reported the numbness started on the R side of his face and had extended to his R hand and R side of his abdomen without any RLE numbness/tingling. 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 Per chart review, he had minimal dysmetria in the RUE while at [facility], but otherwise had no focal deficits. MRI with and without contrast was brain negative for any mass. An IVC filter was placed, and his oral AC was not resumed. He was transferred to a rehab facility, and was started on SQH for DVT PPX. He then transferred to [facility] ED for complaints of increased nausea, vomiting, and a headache. On arrival to [facility], he was neuro intact. A CT head revealed similar appearance of previously known lesions, and new, small supratentorial lesions were noted. There was a CT abdomen with IV contrast that was completed for malignancy evaluation which was negative for malignancy but did reveal lower extremity DVT extending up to the femoral vein. Apparent filling defect in the IVC above and surrounding the IVC filter could represent thrombus or mixing of IV contrast. BUE and BLE US pending. MRI with and without also pending. NCC was consulted for IPH. Due tot he need for anticoagulation therapy due to DVT and potential IVC filter thrombosis, he will  [place] [facility] [place] and higher level of care.    Hospital Course: 06/22/2025 Pt is HIT positive. Heparin gtt discontinued and started on argatroban yesterday. Therapeutic CTH stable. WADE pending. Plt improved to 90. Hematology consulted and recommending removal of the IVC filter given its prothrombotic risk. IR was consulted for evaluation. ZOE improved, Cr 1.5- discontinue IVF. Stable CTH on therapeutic argatroban. Transition to Eliquis 10mg BID for 6 more days then 5mg BID. Scopolamine patch ordered for nausea.    06/23/2025 Pt transitioned back to argatroban gtt per IR recommendations, PTT therapeutic overnight, went to IR this AM for IVC retrieval and clot removal -> coded in procedure, likely PE from piece of clot breaking off during attempted retrieval. Given IV tPA 50 mg in code, epi x4, ROSC achieved after ~12-15 minutes, please see significant event note for full details. Family updated extensively at bedside s/p code.   06/24/2025 Briefly restarted on argatroban gtt overnight, held 2/2 supratherapeutic PTT. Noted to have stimulus induced myoclonus on exam, hooked up to cEEG -> some epileptiform discharges on R, not time locked to clinical myoclonus. Loaded w/ keppra to prevent seizures given abnormal EEG. Non-responsive off sedation. UOP poor. Oxygenation improved, now on minimal vent settings. Family updated at bedside.   06/25/2025 Supratherapeutic PTT again overnight, CBC w/ drop in Hb to 6.3, given 1 u PRBC. CT C/A/P/thighs obtained to r/o internal bleeding, notably given R thigh markedly larger than L thigh on exam -> CT w/ worsening R pleural effusion, no active hemorrhage. IVFs held, UOP/creatinine improving. Started on pantoprazole 40 mg IV BID for ? Slow GIB, holding on GI consult given pt hemodynamically stable off vasopressor support, pending neuroprognostication s/p cardiac arrest. Holding argatroban gtt given persistent transfusion requirements.   06/26/2025 Extensive GOC  conversation held with family, see ACP note for full details. Continuing w/ current level of care pending family decision. Holding argatroban gtt given downtrending Hb  06/27/2025 Received 1 u PRBCs overnight for Hb 6.7, improved to 7.5 this AM. Neuro exam unchanged. Family electing to transition to comfort based care w/ palliative extubation on 6/28, made DNR/no escalation of care w/ discontinuation of lab draws and neuro checks pending palliative extubation   06/28/2025 NAEON. Pending palliative extubation this evening s/p arrival of additional family to bedside  06/29/2025 Palliative extubation yesterday evening. Hemodynamically stable overnight, did require several pushes of morphine for air hunger. Stable for transfer to HPU for ongoing EOL care pending bed availability.     Interval History:  Please see hospital course above for full details    Review of Systems   Unable to perform ROS: Patient unresponsive     Objective:     Vitals:  Temp: 99.2 °F (37.3 °C)  Pulse: 92  Rhythm: normal sinus rhythm  BP: 128/68  MAP (mmHg): 92  Resp: 14  SpO2: 96 %    Temp  Min: 98.6 °F (37 °C)  Max: 99.3 °F (37.4 °C)  Pulse  Min: 72  Max: 114  BP  Min: 111/59  Max: 153/74  MAP (mmHg)  Min: 81  Max: 105  Resp  Min: 14  Max: 26  SpO2  Min: 88 %  Max: 98 %  Oxygen Concentration (%)  Min: 40  Max: 40    06/28 0701 - 06/29 0700  In: 99.6   Out: 765 [Urine:765]   Unmeasured Output  Unmeasured Urine Occurrence: 1  Unmeasured Stool Occurrence: 0        Physical Exam  Elderly patient resting in bed, eyes closed. Non-responsive to verbal stimuli, reminder of exam deferred given GOC.        Medications:  Continuous ScheduledlevETIRAcetam (Keppra) IV (PEDS and ADULTS), 750 mg, Q12H  scopolamine, 1 patch, Q3 Days  white petrolatum-mineral oiL, , Q8H    PRNglycopyrrolate, 0.2 mg, TID PRN  lorazepam, 1 mg, Q4H PRN  morphine, 2 mg, Q1H PRN  ondansetron, 4 mg, Q4H PRN  sodium chloride 0.9%, 10 mL, PRN      Today I personally reviewed pertinent  medications, imaging, laboratory results, notably: No new imaging or labs to review. Received morphine 2 mg IV x5, ativan 1 mg IV x2 overnight for comfort    Diet  Diet NPO  Diet NPO  NPO    Assessment/Plan:     Neuro  Severe hypoxic-ischemic encephalopathy  2/2 cardiac arrest on 6/23    Encephalopathy  2/2 cardiac arrest    Cardiac/Vascular  Cardiac arrest  S/p PEA arrest on 6/23 during IR procedure for clot removal and IVC filter retrieval  ROSC achieved after ~12-15 minutes, s/p epi x4, IV tPA 50 mg x1  Etiology presumed PE     - Not a candidate for TTM given hypercoaguable state, strict normothermia  - cEEG w/ epileptiform discharges, not time locked to clinical myoclonus -> 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 Prognosis guarded -> family electing to transition to comfort based care, s/p palliative extubation on 6/28, please see ACP notes from 6/26 and 6/27 for details. Will c/w keppra 750 mg BID s/p extubation given clinical myoclonus improved s/p keppra load    Hematology  Deep vein thrombosis (DVT) of lower extremity  History of  Was previously on Eliquis for DVT/PE  BLE US at [facility] earlier this month revealed RLE popliteal DVT  CT abdomen pelvis with IV contrast at [facility] showed extension of DVT to the femoral vein with potential clot around the IVC filter  Pt is HIT positive.   Hematology consulted and recommending removal of the IVC filter given its prothrombotic risk -> IVC filter removed 6/23, unable to fully remove clot prior to patient coding  WADE negative-> c/w HIT precautions given high clinical suspicion. Discuss resending WADE w/ hematology team -> no indication at this time given [facility]  Suspect DVT as  etiology of RLE swelling, NTD at this time.   S/p IV tPA on 6/23 during cardiac arrest, argatroban gtt on hold due to [facility]    Orthopedic  Right leg swelling  Noted on exam    - CT thigh w/o evidence of hematoma formation, does have R>L soft tissue edema  - Suspect RLE swelling 2/2 known R femoral and iliac DVT  - Monitor distal pulses -> currently dopplerable   - Management of DVT as able -> no plans to resume AC at this time given recurrent bleeding, GOC    Palliative Care  Comfort measures only status  2/2 HIE w/ poor long term prognosis    - S/p palliative extubation on 6/28  - Morphine and ativan PRN for air hunger, start gtt if needing frequent pushes  - Robinul PRN w/ scopolamine patch for secretions    Stable for transfer to HPU for ongoing EOL care pending bed availability        Goals of care, counseling/discussion  See ACP notes dated 6/26 and 6/27 for full details    - S/p palliative extubation w/ transition to comfort based care on 6/28  - D/c neuro checks and labs draws   - C/w keppra 750 mg BID given clinical myoclonus, pt at risk for clinical seizures     Transfer to HPU s/p palliative extubation pending bed availability. Family aware and in agreement          The patient is being Prophylaxed for:  Venous Thromboembolism with: None  Stress Ulcer with: None  Ventilator Pneumonia with: none    Activity Orders            Diet NPO: NPO starting at 06/23 0001          DNR - comfort care    Transfer to HPU for ongoing EOL care pending bed availability    Level III visit    Anne Mabry MD  Neurocritical Care  [facility] - Neuro Critical Care

## 2025-06-29 NOTE — SUBJECTIVE & OBJECTIVE
Interval History:  Please see hospital course above for full details    Review of Systems   Unable to perform ROS: Patient unresponsive     Objective:     Vitals:  Temp: 99.2 °F (37.3 °C)  Pulse: 92  Rhythm: normal sinus rhythm  BP: 128/68  MAP (mmHg): 92  Resp: 14  SpO2: 96 %    Temp  Min: 98.6 °F (37 °C)  Max: 99.3 °F (37.4 °C)  Pulse  Min: 72  Max: 114  BP  Min: 111/59  Max: 153/74  MAP (mmHg)  Min: 81  Max: 105  Resp  Min: 14  Max: 26  SpO2  Min: 88 %  Max: 98 %  Oxygen Concentration (%)  Min: 40  Max: 40    06/28 0701 - 06/29 0700  In: 99.6   Out: 765 [Urine:765]   Unmeasured Output  Unmeasured Urine Occurrence: 1  Unmeasured Stool Occurrence: 0        Physical Exam  Elderly patient resting in bed, eyes closed. Non-responsive to verbal stimuli, reminder of exam deferred given GOC.        Medications:  Continuous ScheduledlevETIRAcetam (Keppra) IV (PEDS and ADULTS), 750 mg, Q12H  scopolamine, 1 patch, Q3 Days  white petrolatum-mineral oiL, , Q8H    PRNglycopyrrolate, 0.2 mg, TID PRN  lorazepam, 1 mg, Q4H PRN  morphine, 2 mg, Q1H PRN  ondansetron, 4 mg, Q4H PRN  sodium chloride 0.9%, 10 mL, PRN      Today I personally reviewed pertinent medications, imaging, laboratory results, notably: No new imaging or labs to review. Received morphine 2 mg IV x5, ativan 1 mg IV x2 overnight for comfort    Diet  Diet NPO  Diet NPO  NPO

## 2025-06-29 NOTE — NURSING
Patient transferred to [facility] on Hospice/Palliative unit.    Report given to Krishna and Miranda, RNs. Patient transferred with 2 family members at bedside (son in law and nephew).  Pt's wife Virginia notified that patient is now settled into his new room.

## 2025-06-29 NOTE — ASSESSMENT & PLAN NOTE
79m with prostate cancer, hx of PE/DVT, recent IPH and IVC placement who coded during IVC removal/thrombectomy with subsequent severe hypoxic-ischemic encephalopathy    -comfort focused care

## 2025-06-29 NOTE — EICU
Virtual ICU Quality Rounds    Admit Date: 6/20/2025  Hospital Day: 8    ICU Day: 8d    24H Vital Sign Range:  Temp:  [98.6 °F (37 °C)-99.4 °F (37.4 °C)]   Pulse:  []   Resp:  [0-26]   BP: (110-153)/(55-79)   SpO2:  [88 %-98 %]     VICU Surveillance Screening  Daily review of  line necessity(optional): Urinary catheter in place  Nguyen Indications : Hospice/comfort care/palliative care, if requested by the family  LDA reconciliation : Yes  Nguyen best practices : Nurse driven nguyen removal protocol ordered

## 2025-06-29 NOTE — PROGRESS NOTES
[facility] - Hospice and Palliative Medicine  Palliative Medicine  Progress Note    Patient Name: Franko Smith  MRN: [id number]  Admission Date: 6/20/2025  Hospital Length of Stay: 8 days  Code Status: DNR   Attending Provider: Mynor Schultz MD  Consulting Provider: Mynor Schultz MD  Primary Care Physician: Hayley, Primary Doctor  Principal Problem:ICH (intracerebral hemorrhage)    Patient information was obtained from spouse/SO and primary team.      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 Went for IVC removal and thrombectomy and patient coded. Subsequently, had poor cognitive recovery. Following goals of care, family opted for compassionate extubation on 6/28, moved to HPU for comfort-focused care and consideration of inpatient hospice admission on 6/29    Interval update 06/29/2025 - transferred to HPU, family updated at bedside, progressing as expected    Hospice diagnosis- severe hypoxic-ischemic encephalopathy    Need for inpatient care-  Intensive patient and family support. Requiring IV comfort medications for pain behaviors, respiratory distress, and agitation. Likely prognosis of Days to short weeks    Dispo: anticipate EOL care on the unit, if patient unexpectedly stabilizes, will approach family regarding care at home or alternative in-facility hospice care    Symptom Assessment  - Pain/ pain behaviors: controlled  - Dyspnea/tachypnea: controlled  - Agitation: controlled  -  Mentation: minimally responsive    Plan of care:  Tachypnea/dyspnea/pain behaviors:   -non pharm measures such as having bedside fan and cooler room temps  -morphine 2mgIV q1h (6 OME/dose) IV every hour as needed for pain, dyspnea, or pain behaviors     Agitation:   -treat with opioids initially  -followed by lorazepam 1mg q2hr PRN    Uncomfortable secretions:  -glycopyrrolate 0.4mg IV q4hr PRN     GI:   -will not prioritize at end of life    Patient/family support  -will engage spiritual care and palliative medicine chaplains to assist family in coping and spiritual support in this difficult time              Subjective:     Chief Complaint:   Chief Complaint   Patient presents with    Vomiting     Pt arrives via ems from [facility] for vomiting. Per nurses only one episode of emesis. Hx of stroke with deficits to right side.       HPI:   Mr Franko Smith is an 79 year old male with prostate CA (diagnosed 2023 s/p radiation), PE, who presents to [facility] ED from [facility] with increased nausea/vomiting. 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 Initial workup revealed possible clot burden around IVC. During attempted IVC removal and thrombectomy, patient coded. Subsequently intubated and sedated. Exam remained poor for the following days in the ICU. Following goals of care, patient underwent compassionate extubation on 6/28. Palliative medicine consulted for HPU transfer. Transferred to HPU on 6/29 for comfort-focused care and consideration of hospice admission        Interval History: Chart reviewed including 24h medication use. Patient resting comfortably in bed, minimally responsive. No family present during visit. Discussed care with wife via telephone who is in agreement with transfer to HPU for comfort-focused care.     Palliative ROS:  PRNs: morphine 2mg IV x3 (18 OME), glyco 0.2mg IV x1, lorazepam  1mg IV x1  Pain behaviors- controlled  Respiratory distress- controlled  Agitation- controlled  Uncomfortable secretions- non-burdensome        Past Medical History:   Diagnosis Date    Anxiety     Diabetes mellitus, type 2     GERD (gastroesophageal reflux disease)     Hyperlipidemia     Hypertension     Obesity (BMI 30-39.9)        Past Surgical History:   Procedure Laterality Date    HERNIA REPAIR      ULNAR NERVE REPAIR Left        Review of patient's allergies indicates:   Allergen Reactions    Ether for anesthesia Nausea And Vomiting       Medications:  Continuous Infusions:  Scheduled Meds:   levETIRAcetam (Keppra) IV (PEDS and ADULTS)  750 mg Intravenous Q12H    scopolamine  1 patch Transdermal Q3 Days    white petrolatum-mineral oiL   Both Eyes Q8H     PRN Meds:  Current Facility-Administered Medications:     glycopyrrolate, 0.4 mg, Intravenous, Q4H PRN    lorazepam, 1 mg, Intravenous, Q4H PRN    morphine, 2 mg, Intravenous, Q1H PRN    ondansetron, 4 mg, Intravenous, Q4H PRN    sodium chloride 0.9%, 10 mL, Intravenous, PRN    Family History       Problem Relation (Age of Onset)    Cancer Father    Diabetes Sister, Brother    Hyperlipidemia Mother    Hypertension Mother          Tobacco Use    Smoking status: Never     Passive exposure: Never    Smokeless tobacco: Never   Substance and Sexual Activity    Alcohol use: Yes     Alcohol/week: 0.0 standard drinks of alcohol     Comment: social    Drug use: No    Sexual activity: Yes     Partners: Female       Review of Systems  Objective:     Vital Signs (Most Recent):  Temp: 98.3 °F (36.8 °C) (06/29/25 1515)  Pulse: 100 (06/29/25 1515)  Resp: (!) 22 (06/29/25 1515)  BP: 130/71 (06/29/25 1515)  SpO2: (!) 90 % (06/29/25 1515) Vital Signs (24h Range):  Temp:  [98.3 °F (36.8 °C)-99.3 °F (37.4 °C)] 98.3 °F (36.8 °C)  Pulse:  [] 100  Resp:  [14-26] 22  SpO2:  [88 %-97 %] 90 %  BP: (111-146)/(57-79) 130/71     Weight: 103.9 kg (229 lb 0.9 oz)  Body mass index is  "31.07 kg/m².       Physical Exam  Vitals and nursing note reviewed.   Constitutional:       General: He is not in acute distress.     Appearance: He is ill-appearing.      Comments: Older, ill-appearing male lying in bed, minimally responsive, no observed pain behaviors     HENT:      Mouth/Throat:      Mouth: Mucous membranes are moist.   Cardiovascular:      Comments: Radial pulses intact  Pulmonary:      Effort: Pulmonary effort is normal. No respiratory distress.      Comments: unlabored  Abdominal:      General: Abdomen is flat.      Palpations: Abdomen is soft.   Skin:     General: Skin is warm and dry.   Neurological:      Comments: Minimally responsive                Advance Care Planning  Advance Directives:   Do Not Resuscitate Status: Yes      Decision Making:  Family answered questions and Patient unable to communicate due to disease severity/cognitive impairment  Goals of Care: Continued supportive discussion with patient's wife via telephone and at bedside regarding most appropriate care for patient in the setting of neurodevastating injury and end of life care. Virginia with very good insight and prognostic awareness, sharing that she understands Mr Smith's prognosis and that she wants to prioritize his dignity and comfort.     I reviewed the philosophy and scope of our hospice unit, where care is entirely focused on goal-concordant symptom management and patient/family support. Consequently, only medical interventions and treatment which help with that goal will be continued and other routine medical care such as frequent vitals, lab draws and invasive tests/procedures will not be done. She is in agreement with transfer             CBC:   Recent Labs   Lab 06/27/25  0709   WBC 6.00   HGB 7.5*   HCT 23.2*   MCV 89        BMP:  No results for input(s): "GLU", "NA", "K", "CL", "CO2", "BUN", "CREATININE", "CALCIUM", "MG" in the last 24 hours.  LFT:  Lab Results   Component Value Date    AST 42 " 06/27/2025    ALKPHOS 87 06/27/2025    BILITOT 1.1 (H) 06/27/2025     Albumin:   Albumin   Date Value Ref Range Status   06/27/2025 2.2 (L) 3.5 - 5.2 g/dL Final   06/09/2025 3.9 3.4 - 5.0 g/dL Final   01/29/2016 3.9 3.5 - 5.2 g/dL Final     Protein:   Protein Total   Date Value Ref Range Status   06/27/2025 5.4 (L) 6.0 - 8.4 gm/dL Final     Total Protein   Date Value Ref Range Status   01/29/2016 6.9 6.0 - 8.4 g/dL Final     Lactic acid:   Lab Results   Component Value Date    LACTATE 1.2 06/24/2025    LACTATE 1.5 06/23/2025       Reviewed CBC with anemia, CMP with stable renal function. MRI brain 6/26 with sequelae of anoxic-ischemic brain injury      In my care of this patient with acute on chronic severe illness with threat to life and/or bodily function, I am recommending goal-concordant care as noted above. I spent a significant amount of time reviewing external records/ recommendations of other providers (NCC), reviewing recent test results (CBC, CMP), and discussed care with other subspecialists involved (NeuroCrit)    In addition to above, I spent 16 minutes specifically discussing advance care planning and goals of care with patient's family at bedside and via telephone      Mynor Schultz MD  Palliative Medicine  [facility] - Hospice and Palliative Medicine

## 2025-06-29 NOTE — ASSESSMENT & PLAN NOTE
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 Prognosis guarded -> family electing to transition to comfort based care, s/p palliative extubation on 6/28, please see ACP notes from 6/26 and 6/27 for details. Will c/w keppra 750 mg BID s/p extubation given clinical myoclonus improved s/p keppra load

## 2025-06-29 NOTE — HPI
Mr Franko Smith is an 79 year old male with prostate CA (diagnosed 2023 s/p radiation), PE, who presents to [facility] from [facility] with increased nausea/vomiting. 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 Initial workup revealed possible clot burden around IVC. During attempted IVC removal and thrombectomy, patient coded. Subsequently intubated and sedated. Exam remained poor for the following days in the ICU. Following goals of care, patient underwent compassionate extubation on 6/28. Palliative medicine consulted for HPU transfer. Transferred to HPU on 6/29 for comfort-focused care and consideration of hospice admission

## 2025-06-29 NOTE — ASSESSMENT & PLAN NOTE
History of  Was previously on Eliquis for DVT/PE  BLE US at [facility] earlier this month revealed RLE popliteal DVT  CT abdomen pelvis with IV contrast at [facility] ED showed extension of DVT to the femoral vein with potential clot around the IVC filter  Pt is HIT positive.   Hematology consulted and recommending removal of the IVC filter given its prothrombotic risk -> IVC filter removed 6/23, unable to fully remove clot prior to patient coding  WADE negative-> c/w HIT precautions given high clinical suspicion. Discuss resending WADE w/ hematology team -> no indication at this time given [facility]  Suspect DVT as etiology of RLE swelling, NTD at this time.   S/p IV tPA on 6/23 during cardiac arrest, argatroban gtt on hold due to GOC

## 2025-06-29 NOTE — SUBJECTIVE & OBJECTIVE
Interval History: Chart reviewed including 24h medication use. Patient resting comfortably in bed, minimally responsive. No family present during visit. Discussed care with wife via telephone who is in agreement with transfer to HPU for comfort-focused care.     Palliative ROS:  PRNs: morphine 2mg IV x3 (18 OME), glyco 0.2mg IV x1, lorazepam 1mg IV x1  Pain behaviors- controlled  Respiratory distress- controlled  Agitation- controlled  Uncomfortable secretions- non-burdensome        Past Medical History:   Diagnosis Date    Anxiety     Diabetes mellitus, type 2     GERD (gastroesophageal reflux disease)     Hyperlipidemia     Hypertension     Obesity (BMI 30-39.9)        Past Surgical History:   Procedure Laterality Date    HERNIA REPAIR      ULNAR NERVE REPAIR Left        Review of patient's allergies indicates:   Allergen Reactions    Ether for anesthesia Nausea And Vomiting       Medications:  Continuous Infusions:  Scheduled Meds:   levETIRAcetam (Keppra) IV (PEDS and ADULTS)  750 mg Intravenous Q12H    scopolamine  1 patch Transdermal Q3 Days    white petrolatum-mineral oiL   Both Eyes Q8H     PRN Meds:  Current Facility-Administered Medications:     glycopyrrolate, 0.4 mg, Intravenous, Q4H PRN    lorazepam, 1 mg, Intravenous, Q4H PRN    morphine, 2 mg, Intravenous, Q1H PRN    ondansetron, 4 mg, Intravenous, Q4H PRN    sodium chloride 0.9%, 10 mL, Intravenous, PRN    Family History       Problem Relation (Age of Onset)    Cancer Father    Diabetes Sister, Brother    Hyperlipidemia Mother    Hypertension Mother          Tobacco Use    Smoking status: Never     Passive exposure: Never    Smokeless tobacco: Never   Substance and Sexual Activity    Alcohol use: Yes     Alcohol/week: 0.0 standard drinks of alcohol     Comment: social    Drug use: No    Sexual activity: Yes     Partners: Female       Review of Systems  Objective:     Vital Signs (Most Recent):  Temp: 98.3 °F (36.8 °C) (06/29/25 1515)  Pulse: 100  (06/29/25 1515)  Resp: (!) 22 (06/29/25 1515)  BP: 130/71 (06/29/25 1515)  SpO2: (!) 90 % (06/29/25 1515) Vital Signs (24h Range):  Temp:  [98.3 °F (36.8 °C)-99.3 °F (37.4 °C)] 98.3 °F (36.8 °C)  Pulse:  [] 100  Resp:  [14-26] 22  SpO2:  [88 %-97 %] 90 %  BP: (111-146)/(57-79) 130/71     Weight: 103.9 kg (229 lb 0.9 oz)  Body mass index is 31.07 kg/m².       Physical Exam  Vitals and nursing note reviewed.   Constitutional:       General: He is not in acute distress.     Appearance: He is ill-appearing.      Comments: Older, ill-appearing male lying in bed, minimally responsive, no observed pain behaviors     HENT:      Mouth/Throat:      Mouth: Mucous membranes are moist.   Cardiovascular:      Comments: Radial pulses intact  Pulmonary:      Effort: Pulmonary effort is normal. No respiratory distress.      Comments: unlabored  Abdominal:      General: Abdomen is flat.      Palpations: Abdomen is soft.   Skin:     General: Skin is warm and dry.   Neurological:      Comments: Minimally responsive                Advance Care Planning   Advance Directives:   Do Not Resuscitate Status: Yes      Decision Making:  Family answered questions and Patient unable to communicate due to disease severity/cognitive impairment  Goals of Care: Continued supportive discussion with patient's wife via telephone and at bedside regarding most appropriate care for patient in the setting of neurodevastating injury and end of life care. [place] with very good insight and prognostic awareness, sharing that she understands Mr Smith's prognosis and that she wants to prioritize his dignity and comfort.     I reviewed the philosophy and scope of our hospice unit, where care is entirely focused on goal-concordant symptom management and patient/family support. Consequently, only medical interventions and treatment which help with that goal will be continued and other routine medical care such as frequent vitals, lab draws and invasive  "tests/procedures will not be done. She is in agreement with transfer             CBC:   Recent Labs   Lab 06/27/25  0709   WBC 6.00   HGB 7.5*   HCT 23.2*   MCV 89        BMP:  No results for input(s): "GLU", "NA", "K", "CL", "CO2", "BUN", "CREATININE", "CALCIUM", "MG" in the last 24 hours.  LFT:  Lab Results   Component Value Date    AST 42 06/27/2025    ALKPHOS 87 06/27/2025    BILITOT 1.1 (H) 06/27/2025     Albumin:   Albumin   Date Value Ref Range Status   06/27/2025 2.2 (L) 3.5 - 5.2 g/dL Final   06/09/2025 3.9 3.4 - 5.0 g/dL Final   01/29/2016 3.9 3.5 - 5.2 g/dL Final     Protein:   Protein Total   Date Value Ref Range Status   06/27/2025 5.4 (L) 6.0 - 8.4 gm/dL Final     Total Protein   Date Value Ref Range Status   01/29/2016 6.9 6.0 - 8.4 g/dL Final     Lactic acid:   Lab Results   Component Value Date    LACTATE 1.2 06/24/2025    LACTATE 1.5 06/23/2025       "

## 2025-06-29 NOTE — ASSESSMENT & PLAN NOTE
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 Went for IVC removal and thrombectomy and patient coded. Subsequently, had poor cognitive recovery. Following goals of care, family opted for compassionate extubation on 6/28, moved to H[facility] for comfort-focused care and consideration of inpatient hospice admission on 6/29    Interval update 06/29/2025 - transferred to HPU, family updated at bedside, progressing as expected    Hospice diagnosis- severe hypoxic-ischemic encephalopathy    Need for inpatient care-  Intensive patient and family support. Requiring IV comfort medications for pain behaviors, respiratory distress, and agitation. Likely prognosis of Days to short weeks    Dispo: anticipate EOL care on the unit, if patient unexpectedly stabilizes, will approach family regarding care at home or alternative in-facility hospice care    Symptom Assessment  - Pain/ pain behaviors: controlled  - Dyspnea/tachypnea: controlled  - Agitation: controlled  - Mentation: minimally responsive    Plan of care:  Tachypnea/dyspnea/pain behaviors:   -non pharm measures such as having bedside fan and cooler room temps  -morphine 2mgIV q1h (6 OME/dose) IV every hour as needed for pain, dyspnea, or pain behaviors     Agitation:   -treat with opioids initially  -followed by lorazepam 1mg q2hr PRN    Uncomfortable secretions:  -glycopyrrolate 0.4mg IV q4hr PRN     GI:   -will not prioritize at end of life    Patient/family support  -will engage spiritual care and palliative medicine chaplains to assist family in coping and spiritual support in this difficult time

## 2025-06-29 NOTE — ASSESSMENT & PLAN NOTE
See ACP notes dated 6/26 and 6/27 for full details    - S/p palliative extubation w/ transition to comfort based care on 6/28  - D/c neuro checks and labs draws   - C/w keppra 750 mg BID given clinical myoclonus, pt at risk for clinical seizures     Transfer to HPU s/p palliative extubation pending bed availability. Family aware and in agreement

## 2025-06-30 NOTE — HPI
Mr Franko Smith is an 79 year old male with prostate CA (diagnosed 2023 s/p radiation), PE, who presents to [facility] ED from [facility] with increased nausea/vomiting. 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 Initial workup revealed possible clot burden around IVC. During attempted IVC removal and thrombectomy, patient coded. Subsequently intubated and sedated. Exam remained poor for the following days in the ICU. Following goals of care, patient underwent compassionate extubation on 6/28. Palliative medicine consulted for H[facility] transfer. Transferred to HPU on 6/29 for comfort-focused care and admitted to [facility] level of hospice care 6/30

## 2025-06-30 NOTE — SUBJECTIVE & OBJECTIVE
Interval History: Chart reviewed including 24h medication use. Patient resting comfortably in bed, minimally responsive. Discussed with bother (Dr. Smith) and provided updates.     Palliative ROS:  PRNs: morphine 2mg IV x 7 (~50 OME), glyco 0.2mg IV x3, lorazepam 1mg IV x4  Pain behaviors- controlled  Respiratory distress- controlled  Agitation- controlled  Uncomfortable secretions- non-burdensome        Past Medical History:   Diagnosis Date    Anxiety     Diabetes mellitus, type 2     GERD (gastroesophageal reflux disease)     Hyperlipidemia     Hypertension     Obesity (BMI 30-39.9)        Past Surgical History:   Procedure Laterality Date    HERNIA REPAIR      ULNAR NERVE REPAIR Left        Review of patient's allergies indicates:   Allergen Reactions    Ether for anesthesia Nausea And Vomiting       Medications:  Continuous Infusions:  Scheduled Meds:   levETIRAcetam (Keppra) IV (PEDS and ADULTS)  750 mg Intravenous Q12H    scopolamine  1 patch Transdermal Q3 Days    white petrolatum-mineral oiL   Both Eyes Q8H     PRN Meds:  Current Facility-Administered Medications:     glycopyrrolate, 0.4 mg, Intravenous, Q4H PRN    lorazepam, 1 mg, Intravenous, Q4H PRN    morphine, 2 mg, Intravenous, Q1H PRN    ondansetron, 4 mg, Intravenous, Q4H PRN    sodium chloride 0.9%, 10 mL, Intravenous, PRN    Family History       Problem Relation (Age of Onset)    Cancer Father    Diabetes Sister, Brother    Hyperlipidemia Mother    Hypertension Mother          Tobacco Use    Smoking status: Never     Passive exposure: Never    Smokeless tobacco: Never   Substance and Sexual Activity    Alcohol use: Yes     Alcohol/week: 0.0 standard drinks of alcohol     Comment: social    Drug use: No    Sexual activity: Yes     Partners: Female       Review of Systems  Objective:     Vital Signs (Most Recent):  Temp: 98.4 °F (36.9 °C) (06/30/25 0738)  Pulse: 104 (06/30/25 0738)  Resp: (!) 28 (06/30/25 1047)  BP: (!) 127/58 (06/30/25 0738)  SpO2:  (!) 91 % (06/29/25 2000) Vital Signs (24h Range):  Temp:  [98.3 °F (36.8 °C)-98.8 °F (37.1 °C)] 98.4 °F (36.9 °C)  Pulse:  [] 104  Resp:  [14-30] 28  SpO2:  [90 %-97 %] 91 %  BP: (119-153)/(58-71) 127/58     Weight: 103.9 kg (229 lb 0.9 oz)  Body mass index is 31.07 kg/m².       Physical Exam  Vitals and nursing note reviewed.   Constitutional:       General: He is not in acute distress.     Appearance: He is ill-appearing.      Comments: Older, ill-appearing male lying in bed, minimally responsive, no observed pain behaviors     HENT:      Mouth/Throat:      Mouth: Mucous membranes are moist.   Cardiovascular:      Comments: Radial pulses intact  Pulmonary:      Effort: Pulmonary effort is normal. No respiratory distress.      Comments: unlabored  Abdominal:      General: Abdomen is flat.      Palpations: Abdomen is soft.   Skin:     General: Skin is warm and dry.   Neurological:      Comments: Minimally responsive                Advance Care Planning   Advance Directives:   Do Not Resuscitate Status: Yes      Decision Making:  Family answered questions and Patient unable to communicate due to disease severity/cognitive impairment  Goals of Care: Continued supportive discussion with patient's wife via telephone and at bedside regarding most appropriate care for patient in the setting of neurodevastating injury and end of life care. [place] with very good insight and prognostic awareness, sharing that she understands Mr Smith's prognosis and that she wants to prioritize his dignity and comfort.     I reviewed the philosophy and scope of our hospice unit, where care is entirely focused on goal-concordant symptom management and patient/family support. Consequently, only medical interventions and treatment which help with that goal will be continued and other routine medical care such as frequent vitals, lab draws and invasive tests/procedures will not be done. She is in agreement with transfer             CBC:  "  Recent Labs   Lab 06/27/25  0709   WBC 6.00   HGB 7.5*   HCT 23.2*   MCV 89        BMP:  No results for input(s): "GLU", "NA", "K", "CL", "CO2", "BUN", "CREATININE", "CALCIUM", "MG" in the last 24 hours.  LFT:  Lab Results   Component Value Date    AST 42 06/27/2025    ALKPHOS 87 06/27/2025    BILITOT 1.1 (H) 06/27/2025     Albumin:   Albumin   Date Value Ref Range Status   06/27/2025 2.2 (L) 3.5 - 5.2 g/dL Final   06/09/2025 3.9 3.4 - 5.0 g/dL Final   01/29/2016 3.9 3.5 - 5.2 g/dL Final     Protein:   Protein Total   Date Value Ref Range Status   06/27/2025 5.4 (L) 6.0 - 8.4 gm/dL Final     Total Protein   Date Value Ref Range Status   01/29/2016 6.9 6.0 - 8.4 g/dL Final     Lactic acid:   Lab Results   Component Value Date    LACTATE 1.2 06/24/2025    LACTATE 1.5 06/23/2025       Palliative Exam  "

## 2025-06-30 NOTE — ASSESSMENT & PLAN NOTE
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 Went for IVC removal and thrombectomy and patient coded. Subsequently, had poor cognitive recovery. Following goals of care, family opted for compassionate extubation on 6/28, moved to HPU for comfort-focused care and consideration of inpatient hospice admission on 6/29; [facility] 6/30     Interval update 06/30/2025 - transferred to HPU yesterday; eligible for [facility] hospice 2/2 increased care needs and inability to meet needs in another setting     Hospice diagnosis- severe hypoxic-ischemic encephalopathy     Need for inpatient care-  Intensive patient and family support. Requiring IV comfort medications for pain behaviors, respiratory distress, and agitation. Likely prognosis of Days to short weeks     Dispo: anticipate EOL care on the unit; anticipate admitting to [facility] level of hospice     Symptom Assessment  - Pain/ pain behaviors: controlled  - Dyspnea/tachypnea: controlled  - Agitation: controlled  - Mentation: minimally responsive     Plan of care:  Tachypnea/dyspnea/pain behaviors:   -non pharm measures such as having bedside fan and cooler room temps  -morphine infusion 1 mg/hr; morphine 2mgIV q1h (6 OME/dose) IV every hour as needed for pain, dyspnea, or pain behaviors     Agitation:   -treat with opioids initially  -followed by lorazepam 1mg q2hr PRN     Uncomfortable secretions:  -glycopyrrolate 0.4mg IV q4hr PRN; positioning     GI:   -will not prioritize at end of life     Patient/family support  -will engage spiritual care and palliative medicine chaplains to assist family in coping and spiritual support in this difficult time

## 2025-06-30 NOTE — PROGRESS NOTES
[facility] and Palliative Medicine  Palliative Medicine  Progress Note    Patient Name: Franko Smith  MRN: [id number]  Admission Date: 6/20/2025  Hospital Length of Stay: 9 days  Code Status: DNR   Attending Provider: Mynor Schultz MD  Consulting Provider: Robbin Londono MD  Primary Care Physician: Hayley, Primary Doctor  Principal Problem:ICH (intracerebral hemorrhage)    Patient information was obtained from spouse/SO, relative(s), past medical records, and primary team.      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 Went for IVC removal and thrombectomy and patient coded. Subsequently, had poor cognitive recovery. Following goals of care, family opted for compassionate extubation on 6/28, moved to HPU for comfort-focused care and consideration of inpatient hospice admission on 6/29    Interval update 06/30/2025 - transferred to HPU yesterday; eligible for [facility] hospice 2/2 increased care needs and inability to meet needs in another setting    Hospice diagnosis- severe hypoxic-ischemic encephalopathy    Need for inpatient care-  Intensive patient and family support. Requiring IV comfort medications for pain behaviors, respiratory distress, and agitation. Likely prognosis of Days to short weeks    Dispo: anticipate EOL care on the unit; anticipate admitting to [facility] level of hospice    Symptom Assessment  - Pain/ pain behaviors: controlled  - Dyspnea/tachypnea: controlled  - Agitation: controlled  - Mentation: minimally responsive    Plan of care:  Tachypnea/dyspnea/pain behaviors:   -non pharm measures such as having bedside fan and cooler room temps  -morphine 2mgIV q1h (6 OME/dose) IV every hour as needed for pain, dyspnea, or  pain behaviors     Agitation:   -treat with opioids initially  -followed by lorazepam 1mg q2hr PRN    Uncomfortable secretions:  -glycopyrrolate 0.4mg IV q4hr PRN     GI:   -will not prioritize at end of life    Patient/family support  -will engage spiritual care and palliative medicine chaplains to assist family in coping and spiritual support in this difficult time        Subjective:     Chief Complaint:   Chief Complaint   Patient presents with    Vomiting     Pt arrives via ems from [facility]hab for vomiting. Per nurses only one episode of emesis. Hx of stroke with deficits to right side.       HPI:   Mr Franko Smith is an 79 year old male with prostate CA (diagnosed 2023 s/p radiation), PE, who [place] [facility] [place] [facility] with increased nausea/vomiting. 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 Initial workup revealed possible clot burden around IVC. During attempted IVC removal and thrombectomy, patient coded. Subsequently intubated and sedated. Exam remained poor for the following days in the ICU. Following goals of care, patient underwent compassionate extubation on 6/28. Palliative medicine consulted for H[facility] transfer. Transferred to H[facility] on 6/29 for comfort-focused care and consideration of hospice admission    Hospital Course:  No notes on file    Interval History: Chart reviewed including 24h medication use. Patient resting comfortably in bed, minimally responsive. Discussed with bother (Dr. Smith) and provided updates.     Palliative ROS:  PRNs: morphine 2mg IV x 7 (~50 OME), glyco 0.2mg IV x3, lorazepam 1mg IV x4  Pain behaviors- controlled  Respiratory distress- controlled  Agitation- controlled  Uncomfortable secretions- non-burdensome        Past Medical History:   Diagnosis Date    Anxiety     Diabetes mellitus, type 2     GERD (gastroesophageal reflux disease)     Hyperlipidemia      Hypertension     Obesity (BMI 30-39.9)        Past Surgical History:   Procedure Laterality Date    HERNIA REPAIR      ULNAR NERVE REPAIR Left        Review of patient's allergies indicates:   Allergen Reactions    Ether for anesthesia Nausea And Vomiting       Medications:  Continuous Infusions:  Scheduled Meds:   levETIRAcetam (Keppra) IV (PEDS and ADULTS)  750 mg Intravenous Q12H    scopolamine  1 patch Transdermal Q3 Days    white petrolatum-mineral oiL   Both Eyes Q8H     PRN Meds:  Current Facility-Administered Medications:     glycopyrrolate, 0.4 mg, Intravenous, Q4H PRN    lorazepam, 1 mg, Intravenous, Q4H PRN    morphine, 2 mg, Intravenous, Q1H PRN    ondansetron, 4 mg, Intravenous, Q4H PRN    sodium chloride 0.9%, 10 mL, Intravenous, PRN    Family History       Problem Relation (Age of Onset)    Cancer Father    Diabetes Sister, Brother    Hyperlipidemia Mother    Hypertension Mother          Tobacco Use    Smoking status: Never     Passive exposure: Never    Smokeless tobacco: Never   Substance and Sexual Activity    Alcohol use: Yes     Alcohol/week: 0.0 standard drinks of alcohol     Comment: social    Drug use: No    Sexual activity: Yes     Partners: Female       Review of Systems  Objective:     Vital Signs (Most Recent):  Temp: 98.4 °F (36.9 °C) (06/30/25 0738)  Pulse: 104 (06/30/25 0738)  Resp: (!) 28 (06/30/25 1047)  BP: (!) 127/58 (06/30/25 0738)  SpO2: (!) 91 % (06/29/25 2000) Vital Signs (24h Range):  Temp:  [98.3 °F (36.8 °C)-98.8 °F (37.1 °C)] 98.4 °F (36.9 °C)  Pulse:  [] 104  Resp:  [14-30] 28  SpO2:  [90 %-97 %] 91 %  BP: (119-153)/(58-71) 127/58     Weight: 103.9 kg (229 lb 0.9 oz)  Body mass index is 31.07 kg/m².       Physical Exam  Vitals and nursing note reviewed.   Constitutional:       General: He is not in acute distress.     Appearance: He is ill-appearing.      Comments: Older, ill-appearing male lying in bed, minimally responsive, no observed pain behaviors     HENT:     "  Mouth/Throat:      Mouth: Mucous membranes are moist.   Cardiovascular:      Comments: Radial pulses intact  Pulmonary:      Effort: Pulmonary effort is normal. No respiratory distress.      Comments: unlabored  Abdominal:      General: Abdomen is flat.      Palpations: Abdomen is soft.   Skin:     General: Skin is warm and dry.   Neurological:      Comments: Minimally responsive                Advance Care Planning  Advance Directives:   Do Not Resuscitate Status: Yes      Decision Making:  Family answered questions and Patient unable to communicate due to disease severity/cognitive impairment  Goals of Care: Continued supportive discussion with patient's wife via telephone and at bedside regarding most appropriate care for patient in the setting of neurodevastating injury and end of life care. [place] with very good insight and prognostic awareness, sharing that she understands Mr Smith's prognosis and that she wants to prioritize his dignity and comfort.     I reviewed the philosophy and scope of our hospice unit, where care is entirely focused on goal-concordant symptom management and patient/family support. Consequently, only medical interventions and treatment which help with that goal will be continued and other routine medical care such as frequent vitals, lab draws and invasive tests/procedures will not be done. She is in agreement with transfer             CBC:   Recent Labs   Lab 06/27/25  0709   WBC 6.00   HGB 7.5*   HCT 23.2*   MCV 89        BMP:  No results for input(s): "GLU", "NA", "K", "CL", "CO2", "BUN", "CREATININE", "CALCIUM", "MG" in the last 24 hours.  LFT:  Lab Results   Component Value Date    AST 42 06/27/2025    ALKPHOS 87 06/27/2025    BILITOT 1.1 (H) 06/27/2025     Albumin:   Albumin   Date Value Ref Range Status   06/27/2025 2.2 (L) 3.5 - 5.2 g/dL Final   06/09/2025 3.9 3.4 - 5.0 g/dL Final   01/29/2016 3.9 3.5 - 5.2 g/dL Final     Protein:   Protein Total   Date Value Ref Range " Status   06/27/2025 5.4 (L) 6.0 - 8.4 gm/dL Final     Total Protein   Date Value Ref Range Status   01/29/2016 6.9 6.0 - 8.4 g/dL Final     Lactic acid:   Lab Results   Component Value Date    LACTATE 1.2 06/24/2025    LACTATE 1.5 06/23/2025       Palliative Exam    Robbin Londono MD  Palliative Medicine  [facility] - Hospice and Palliative Medicine

## 2025-06-30 NOTE — SUBJECTIVE & OBJECTIVE
Interval History: Chart reviewed including 24h medication use. Patient resting comfortably in bed, minimally responsive. Discussed with bother (Dr. Smith) and provided updates.     Palliative ROS:  PRNs: morphine 2mg IV x 7 (~50 OME), glyco 0.2mg IV x3, lorazepam 1mg IV x4  Pain behaviors- controlled  Respiratory distress- controlled  Agitation- controlled  Uncomfortable secretions- non-burdensome        Past Medical History:   Diagnosis Date    Anxiety     Diabetes mellitus, type 2     GERD (gastroesophageal reflux disease)     Hyperlipidemia     Hypertension     Obesity (BMI 30-39.9)        Past Surgical History:   Procedure Laterality Date    HERNIA REPAIR      ULNAR NERVE REPAIR Left        Review of patient's allergies indicates:   Allergen Reactions    Ether for anesthesia Nausea And Vomiting       Medications:  Continuous Infusions:   morphine  1 mg/hr Intravenous Continuous 1 mL/hr at 06/30/25 1445 1 mg/hr at 06/30/25 1445     Scheduled Meds:   levETIRAcetam (Keppra) IV (PEDS and ADULTS)  750 mg Intravenous Q12H    [START ON 7/3/2025] scopolamine  1 patch Transdermal Q3 Days    white petrolatum-mineral oiL   Both Eyes Q8H     PRN Meds:  Current Facility-Administered Medications:     glycopyrrolate, 0.2 mg, Intravenous, Q4H PRN    lorazepam, 1 mg, Intravenous, Q4H PRN    morphine, 2 mg, Intravenous, Q1H PRN    morphine, 2 mg, Intravenous, Q30 Min PRN    ondansetron, 4 mg, Intravenous, Q4H PRN    Family History       Problem Relation (Age of Onset)    Cancer Father    Diabetes Sister, Brother    Hyperlipidemia Mother    Hypertension Mother          Tobacco Use    Smoking status: Never     Passive exposure: Never    Smokeless tobacco: Never   Substance and Sexual Activity    Alcohol use: Yes     Alcohol/week: 0.0 standard drinks of alcohol     Comment: social    Drug use: No    Sexual activity: Yes     Partners: Female       Review of Systems  Objective:     Vital Signs (Most Recent):    Vital Signs (24h  Range):  Temp:  [98.3 °F (36.8 °C)-98.8 °F (37.1 °C)] 98.4 °F (36.9 °C)  Pulse:  [] 104  Resp:  [22-30] 26  SpO2:  [0 %-91 %] 0 %  BP: (127-153)/(58-71) 127/58        There is no height or weight on file to calculate BMI.       Physical Exam  Vitals and nursing note reviewed.   Constitutional:       General: He is not in acute distress.     Appearance: He is ill-appearing.      Comments: Older, ill-appearing male lying in bed, minimally responsive, no observed pain behaviors     HENT:      Mouth/Throat:      Mouth: Mucous membranes are moist.   Cardiovascular:      Comments: Radial pulses intact  Pulmonary:      Effort: Pulmonary effort is normal. No respiratory distress.      Comments: unlabored  Abdominal:      General: Abdomen is flat.      Palpations: Abdomen is soft.   Skin:     General: Skin is warm and dry.   Neurological:      Comments: Minimally responsive                Advance Care Planning   Advance Directives:   Do Not Resuscitate Status: Yes      Decision Making:  Family answered questions and Patient unable to communicate due to disease severity/cognitive impairment  Goals of Care: Continued supportive discussion with patient's wife via telephone and at bedside regarding most appropriate care for patient in the setting of neurodevastating injury and end of life care. [place] with very good insight and prognostic awareness, sharing that she understands Mr Smith's prognosis and that she wants to prioritize his dignity and comfort.     I reviewed the philosophy and scope of our hospice unit, where care is entirely focused on goal-concordant symptom management and patient/family support. Consequently, only medical interventions and treatment which help with that goal will be continued and other routine medical care such as frequent vitals, lab draws and invasive tests/procedures will not be done. She is in agreement with transfer             CBC:   Recent Labs   Lab 06/27/25  0709   WBC 6.00   HGB  "7.5*   HCT 23.2*   MCV 89        BMP:  No results for input(s): "GLU", "NA", "K", "CL", "CO2", "BUN", "CREATININE", "CALCIUM", "MG" in the last 24 hours.  LFT:  Lab Results   Component Value Date    AST 42 06/27/2025    ALKPHOS 87 06/27/2025    BILITOT 1.1 (H) 06/27/2025     Albumin:   Albumin   Date Value Ref Range Status   06/27/2025 2.2 (L) 3.5 - 5.2 g/dL Final   06/09/2025 3.9 3.4 - 5.0 g/dL Final   01/29/2016 3.9 3.5 - 5.2 g/dL Final     Protein:   Protein Total   Date Value Ref Range Status   06/27/2025 5.4 (L) 6.0 - 8.4 gm/dL Final     Total Protein   Date Value Ref Range Status   01/29/2016 6.9 6.0 - 8.4 g/dL Final     Lactic acid:   Lab Results   Component Value Date    LACTATE 1.2 06/24/2025    LACTATE 1.5 06/23/2025       Palliative Exam  "

## 2025-06-30 NOTE — H&P
[facility]  Palliative Medicine  History & Physical    Patient Name: Franko Smith  MRN: [id number]  Admission Date: 6/30/2025  Attending Physician: Robbin Londono MD  Primary Care Provider: Hayley, Primary Doctor    Patient information was obtained from spouse/SO, past medical records, and primary team.     Subjective:     Chief Complaint/Reason for Admission: AMS    History of Present Illness: Mr Franko Smith is an 79 year old male with prostate CA (diagnosed 2023 s/p radiation), PE, who [place] [facility] with increased nausea/vomiting. 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 Initial workup revealed possible clot burden around IVC. During attempted IVC removal and thrombectomy, patient coded. Subsequently intubated and sedated. Exam remained poor for the following days in the ICU. Following goals of care, patient underwent compassionate extubation on 6/28. Palliative medicine consulted for [facility] transfer. Transferred to [facility] on 6/29 for comfort-focused care and admitted to [facility] level of hospice care 6/30    Interval History: Chart reviewed including 24h medication use. Patient resting comfortably in bed, minimally responsive. Discussed with bother (Dr. Smith) and provided updates.     Palliative ROS:  PRNs: morphine 2mg IV x 7 (~50 OME), glyco 0.2mg IV x3, lorazepam 1mg IV x4  Pain behaviors- controlled  Respiratory distress- controlled  Agitation- controlled  Uncomfortable secretions- non-burdensome        Past Medical History:   Diagnosis Date    Anxiety     Diabetes mellitus, type 2     GERD (gastroesophageal reflux disease)     Hyperlipidemia     Hypertension     Obesity (BMI 30-39.9)        Past Surgical History:   Procedure Laterality Date    HERNIA REPAIR      ULNAR NERVE REPAIR Left        Review of patient's allergies indicates:   Allergen Reactions     Ether for anesthesia Nausea And Vomiting       Medications:  Continuous Infusions:   morphine  1 mg/hr Intravenous Continuous 1 mL/hr at 06/30/25 1445 1 mg/hr at 06/30/25 1445     Scheduled Meds:   levETIRAcetam (Keppra) IV (PEDS and ADULTS)  750 mg Intravenous Q12H    [START ON 7/3/2025] scopolamine  1 patch Transdermal Q3 Days    white petrolatum-mineral oiL   Both Eyes Q8H     PRN Meds:  Current Facility-Administered Medications:     glycopyrrolate, 0.2 mg, Intravenous, Q4H PRN    lorazepam, 1 mg, Intravenous, Q4H PRN    morphine, 2 mg, Intravenous, Q1H PRN    morphine, 2 mg, Intravenous, Q30 Min PRN    ondansetron, 4 mg, Intravenous, Q4H PRN    Family History       Problem Relation (Age of Onset)    Cancer Father    Diabetes Sister, Brother    Hyperlipidemia Mother    Hypertension Mother          Tobacco Use    Smoking status: Never     Passive exposure: Never    Smokeless tobacco: Never   Substance and Sexual Activity    Alcohol use: Yes     Alcohol/week: 0.0 standard drinks of alcohol     Comment: social    Drug use: No    Sexual activity: Yes     Partners: Female       Review of Systems  Objective:     Vital Signs (Most Recent):    Vital Signs (24h Range):  Temp:  [98.3 °F (36.8 °C)-98.8 °F (37.1 °C)] 98.4 °F (36.9 °C)  Pulse:  [] 104  Resp:  [22-30] 26  SpO2:  [0 %-91 %] 0 %  BP: (127-153)/(58-71) 127/58        There is no height or weight on file to calculate BMI.       Physical Exam  Vitals and nursing note reviewed.   Constitutional:       General: He is not in acute distress.     Appearance: He is ill-appearing.      Comments: Older, ill-appearing male lying in bed, minimally responsive, no observed pain behaviors     HENT:      Mouth/Throat:      Mouth: Mucous membranes are moist.   Cardiovascular:      Comments: Radial pulses intact  Pulmonary:      Effort: Pulmonary effort is normal. No respiratory distress.      Comments: unlabored  Abdominal:      General: Abdomen is flat.      Palpations:  "Abdomen is soft.   Skin:     General: Skin is warm and dry.   Neurological:      Comments: Minimally responsive                Advance Care Planning  Advance Directives:   Do Not Resuscitate Status: Yes      Decision Making:  Family answered questions and Patient unable to communicate due to disease severity/cognitive impairment  Goals of Care: Continued supportive discussion with patient's wife via telephone and at bedside regarding most appropriate care for patient in the setting of neurodevastating injury and end of life care. [place] with very good insight and prognostic awareness, sharing that she understands Mr Smith's prognosis and that she wants to prioritize his dignity and comfort.     I reviewed the philosophy and scope of our hospice unit, where care is entirely focused on goal-concordant symptom management and patient/family support. Consequently, only medical interventions and treatment which help with that goal will be continued and other routine medical care such as frequent vitals, lab draws and invasive tests/procedures will not be done. She is in agreement with transfer             CBC:   Recent Labs   Lab 06/27/25  0709   WBC 6.00   HGB 7.5*   HCT 23.2*   MCV 89        BMP:  No results for input(s): "GLU", "NA", "K", "CL", "CO2", "BUN", "CREATININE", "CALCIUM", "MG" in the last 24 hours.  LFT:  Lab Results   Component Value Date    AST 42 06/27/2025    ALKPHOS 87 06/27/2025    BILITOT 1.1 (H) 06/27/2025     Albumin:   Albumin   Date Value Ref Range Status   06/27/2025 2.2 (L) 3.5 - 5.2 g/dL Final   06/09/2025 3.9 3.4 - 5.0 g/dL Final   01/29/2016 3.9 3.5 - 5.2 g/dL Final     Protein:   Protein Total   Date Value Ref Range Status   06/27/2025 5.4 (L) 6.0 - 8.4 gm/dL Final     Total Protein   Date Value Ref Range Status   01/29/2016 6.9 6.0 - 8.4 g/dL Final     Lactic acid:   Lab Results   Component Value Date    LACTATE 1.2 06/24/2025    LACTATE 1.5 06/23/2025       Palliative " Exam  Assessment/Plan:     Comfort measures only status    Patient is a 79 year old male with prostate cancer and recent admit for 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 Went for IVC removal and thrombectomy and patient coded. Subsequently, had poor cognitive recovery. Following goals of care, family opted for compassionate extubation on 6/28, moved to [facility] for comfort-focused care and consideration of inpatient hospice admission on 6/29; [facility] 6/30     Interval update 06/30/2025 - transferred to [facility] yesterday; eligible for [facility] hospice 2/2 increased care needs and inability to meet needs in another setting     Hospice diagnosis- severe hypoxic-ischemic encephalopathy     Need for inpatient care-  Intensive patient and family support. Requiring IV comfort medications for pain behaviors, respiratory distress, and agitation. Likely prognosis of Days to short weeks     Dispo: anticipate EOL care on the unit; anticipate admitting to [facility] level of hospice     Symptom Assessment  - Pain/ pain behaviors: controlled  - Dyspnea/tachypnea: controlled  - Agitation: controlled  - Mentation: minimally responsive     Plan of care:  Tachypnea/dyspnea/pain behaviors:   -non pharm measures such as having bedside fan and cooler room temps  -morphine infusion 1 mg/hr; morphine 2mgIV q1h (6 OME/dose) IV every hour as needed for pain, dyspnea, or pain behaviors     Agitation:   -treat with opioids initially  -followed by lorazepam 1mg q2hr PRN     Uncomfortable secretions:  -glycopyrrolate 0.4mg IV q4hr PRN; positioning     GI:   -will not prioritize at end of life     Patient/family support  -will engage spiritual care and palliative medicine chaplains to assist family in coping and spiritual support in this difficult time      VTE Risk Mitigation (From admission, onward)      None            Robbin Londono,  MD  Palliative Medicine  [facility]

## 2025-06-30 NOTE — PLAN OF CARE
Problem: Skin Injury Risk Increased  Goal: Skin Health and Integrity  Outcome: Progressing     Problem: Palliative Care  Goal: Enhanced Quality of Life  Outcome: Progressing     Problem: Coping Ineffective  Goal: Effective Coping  Outcome: Progressing     Problem: Pain Acute  Goal: Optimal Pain Control and Function  Outcome: Progressing     Problem: End-of-Life Care  Goal: Comfort, Peace and Preserved Dignity  Outcome: Progressing

## 2025-06-30 NOTE — PLAN OF CARE
Problem: Adult Inpatient Plan of Care  Goal: Plan of Care Review  Outcome: Progressing  Goal: Patient-Specific Goal (Individualized)  Outcome: Progressing  Goal: Absence of Hospital-Acquired Illness or Injury  Outcome: Progressing  Goal: Optimal Comfort and Wellbeing  Outcome: Progressing  Goal: Readiness for Transition of Care  Outcome: Progressing     Problem: Palliative Care  Goal: Enhanced Quality of Life  Outcome: Progressing     Problem: Stroke, Intracerebral Hemorrhage  Goal: Optimal Pain Control and Function  Outcome: Progressing     Problem: Diabetes Comorbidity  Goal: Blood Glucose Level Within Targeted Range  Outcome: Not Progressing     Problem: Acute Kidney Injury/Impairment  Goal: Fluid and Electrolyte Balance  Outcome: Not Progressing  Goal: Improved Oral Intake  Outcome: Not Progressing  Goal: Effective Renal Function  Outcome: Not Progressing     Problem: Infection  Goal: Absence of Infection Signs and Symptoms  Outcome: Not Progressing     Problem: Stroke, Intracerebral Hemorrhage  Goal: Optimal Coping  Outcome: Not Progressing  Goal: Effective Bowel Elimination  Outcome: Not Progressing  Goal: Optimal Cerebral Tissue Perfusion  Outcome: Not Progressing  Goal: Optimal Cognitive Function  Outcome: Not Progressing  Goal: Effective Communication Skills  Outcome: Not Progressing  Goal: Optimal Functional Ability  Outcome: Not Progressing  Goal: Optimal Nutrition Intake  Outcome: Not Progressing  Goal: Effective Oxygenation and Ventilation  Outcome: Not Progressing  Goal: Improved Sensorimotor Function  Outcome: Not Progressing  Goal: Safe and Effective Swallow  Outcome: Not Progressing  Goal: Effective Urinary Elimination  Outcome: Not Progressing

## 2025-06-30 NOTE — PLAN OF CARE
[facility]  Discharge Final Note    Primary Care Provider: No, Primary Doctor    Expected Discharge Date: 6/30/2025    Final Discharge Note (most recent)       Final Note - 06/30/25 1539          Final Note    Assessment Type Final Discharge Note     Anticipated Discharge Disposition Hospice/Medical Facility                   Pt flipped to [facility] hospice.      Magui Almonte LMSW  [facility]  [id number]

## 2025-06-30 NOTE — ASSESSMENT & PLAN NOTE
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 Went for IVC removal and thrombectomy and patient coded. Subsequently, had poor cognitive recovery. Following goals of care, family opted for compassionate extubation on 6/28, moved to [facility] for comfort-focused care and consideration of inpatient hospice admission on 6/29    Interval update 06/30/2025 - transferred to [facility] yesterday; eligible for [facility] hospice 2/2 increased care needs and inability to meet needs in another setting    Hospice diagnosis- severe hypoxic-ischemic encephalopathy    Need for inpatient care-  Intensive patient and family support. Requiring IV comfort medications for pain behaviors, respiratory distress, and agitation. Likely prognosis of Days to short weeks    Dispo: anticipate EOL care on the unit; anticipate admitting to [facility] level of hospice    Symptom Assessment  - Pain/ pain behaviors: controlled  - Dyspnea/tachypnea: controlled  - Agitation: controlled  - Mentation: minimally responsive    Plan of care:  Tachypnea/dyspnea/pain behaviors:   -non pharm measures such as having bedside fan and cooler room temps  -morphine 2mgIV q1h (6 OME/dose) IV every hour as needed for pain, dyspnea, or pain behaviors     Agitation:   -treat with opioids initially  -followed by lorazepam 1mg q2hr PRN    Uncomfortable secretions:  -glycopyrrolate 0.4mg IV q4hr PRN     GI:   -will not prioritize at end of life    Patient/family support  -will engage spiritual care and palliative medicine chaplains to assist family in coping and spiritual support in this difficult time

## 2025-07-01 NOTE — NURSING
1853 Received report then rounded on pt, as walking to room, wife and other family members leaving, met them and assured thm he will be cared for, pt with eyes closed, resp even and unlabored, no s/s distress/ discomfort, bed in low locked position    1935 Pt resting with eyes closed, resp even and unlabored, gurgling sounds, BBS CTA no s/s distress/ discomfort, bed in low locked position, assessment completed    2112 Pt remains unresponsive, resp even and unlabored at this time, no s/s distress / discomfort ,scheduled Keppra and eye ointment completed, repositioned at this time    0005 Pt has remained unresponsive resp unlabored at this time with some slight gurgling but less than beginning of shift

## 2025-07-01 NOTE — PROGRESS NOTES
[facility]  Palliative Medicine  Progress Note    Patient Name: Franko Smith  MRN: [id number]  Admission Date: 6/30/2025  Hospital Length of Stay: 1 days  Code Status: DNR   Attending Provider: Robbin Londono MD  Consulting Provider: Robbin Londono MD  Primary Care Physician: No, Primary Doctor  Principal Problem:<principal problem not specified>    Patient information was obtained from relative(s) and nurses.      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 Went for IVC removal and thrombectomy and patient coded. Subsequently, had poor cognitive recovery. Following goals of care, family opted for compassionate extubation on 6/28, moved to [facility] for comfort-focused care and consideration of inpatient hospice admission on 6/29; [facility] 6/30     Interval update 06/30/2025 - transferred to [facility] yesterday; eligible for [facility] hospice 2/2 increased care needs and inability to meet needs in another setting     Hospice diagnosis- severe hypoxic-ischemic encephalopathy     Need for inpatient care-  Intensive patient and family support. Requiring IV comfort medications for pain behaviors, respiratory distress, and agitation. Likely prognosis of Days to short weeks     Dispo: anticipate EOL care on the unit; anticipate admitting to [facility] level of hospice     Symptom Assessment  - Pain/ pain behaviors: controlled  - Dyspnea/tachypnea: controlled  - Agitation: controlled  - Mentation: minimally responsive     Plan of care:  Tachypnea/dyspnea/pain behaviors:   -non pharm measures such as having bedside fan and cooler room temps  -morphine infusion 1 mg/hr; morphine 2mgIV q1h (6 OME/dose) IV every hour as needed for pain, dyspnea, or pain  behaviors     Agitation:   -treat with opioids initially  -followed by lorazepam 1mg q2hr PRN     Uncomfortable secretions:  -glycopyrrolate 0.4mg IV q4hr PRN; positioning     GI:   -will not prioritize at end of life     Patient/family support  -will engage spiritual care and palliative medicine chaplains to assist family in coping and spiritual support in this difficult time      Subjective:     Chief Complaint: No chief complaint on file.      HPI:   Mr Franko Smith is an 79 year old male with prostate CA (diagnosed 2023 s/p radiation), PE, who presents to [facility] ED from [facility] rehab with increased nausea/vomiting. 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 Initial workup revealed possible clot burden around IVC. During attempted IVC removal and thrombectomy, patient coded. Subsequently intubated and sedated. Exam remained poor for the following days in the ICU. Following goals of care, patient underwent compassionate extubation on 6/28. Palliative medicine consulted for [facility] transfer. Transferred to [facility] on 6/29 for comfort-focused care and admitted to [facility] level of hospice care 6/30    Hospital Course:  No notes on file    Interval History: Chart reviewed including 24h medication use. Patient resting comfortably in bed, minimally responsive. Discussed with brother (Dr. Smith) and provided updates.     Palliative ROS:  Morphine infusion 1 mg/hr  PRNs: morphine 2mg IV x 2  Pain behaviors- controlled  Respiratory distress- controlled  Agitation- controlled  Uncomfortable secretions- non-burdensome        Past Medical History:   Diagnosis Date    Anxiety     Diabetes mellitus, type 2     GERD (gastroesophageal reflux disease)     Hyperlipidemia     Hypertension     Obesity (BMI 30-39.9)        Past Surgical History:   Procedure Laterality Date    HERNIA REPAIR      ULNAR NERVE REPAIR Left        Review of  patient's allergies indicates:   Allergen Reactions    Ether for anesthesia Nausea And Vomiting       Medications:  Continuous Infusions:   morphine  1 mg/hr Intravenous Continuous 1 mL/hr at 06/30/25 1445 1 mg/hr at 06/30/25 1445     Scheduled Meds:   levETIRAcetam (Keppra) IV (PEDS and ADULTS)  750 mg Intravenous Q12H    [START ON 7/3/2025] scopolamine  1 patch Transdermal Q3 Days    white petrolatum-mineral oiL   Both Eyes Q8H     PRN Meds:  Current Facility-Administered Medications:     glycopyrrolate, 0.2 mg, Intravenous, Q4H PRN    lorazepam, 1 mg, Intravenous, Q4H PRN    morphine, 2 mg, Intravenous, Q1H PRN    morphine, 2 mg, Intravenous, Q30 Min PRN    ondansetron, 4 mg, Intravenous, Q4H PRN    Family History       Problem Relation (Age of Onset)    Cancer Father    Diabetes Sister, Brother    Hyperlipidemia Mother    Hypertension Mother          Tobacco Use    Smoking status: Never     Passive exposure: Never    Smokeless tobacco: Never   Substance and Sexual Activity    Alcohol use: Yes     Alcohol/week: 0.0 standard drinks of alcohol     Comment: social    Drug use: No    Sexual activity: Yes     Partners: Female       Review of Systems  Objective:     Vital Signs (Most Recent):  Temp: 98.7 °F (37.1 °C) (07/01/25 0747)  Pulse: (!) 115 (07/01/25 0747)  Resp: 18 (07/01/25 0747)  BP: (!) 154/73 (07/01/25 0747)  SpO2: (!) 68 % (07/01/25 0747) Vital Signs (24h Range):  Temp:  [98.6 °F (37 °C)-98.7 °F (37.1 °C)] 98.7 °F (37.1 °C)  Pulse:  [115-121] 115  Resp:  [18-27] 18  SpO2:  [68 %-84 %] 68 %  BP: (127-154)/(73-74) 154/73        There is no height or weight on file to calculate BMI.       Physical Exam  Vitals and nursing note reviewed.   Constitutional:       General: He is not in acute distress.     Appearance: He is ill-appearing.      Comments: Older, ill-appearing male lying in bed, minimally responsive, no observed pain behaviors     HENT:      Mouth/Throat:      Mouth: Mucous membranes are moist.  "  Cardiovascular:      Comments: Radial pulses intact  Pulmonary:      Effort: Pulmonary effort is normal. No respiratory distress.      Comments: unlabored  Abdominal:      General: Abdomen is flat.      Palpations: Abdomen is soft.   Skin:     General: Skin is warm and dry.   Neurological:      Comments: Minimally responsive                Advance Care Planning  Advance Directives:   Do Not Resuscitate Status: Yes      Decision Making:  Family answered questions and Patient unable to communicate due to disease severity/cognitive impairment  Goals of Care: Continued supportive discussion with patient's wife via telephone and at bedside regarding most appropriate care for patient in the setting of neurodevastating injury and end of life care. [place] with very good insight and prognostic awareness, sharing that she understands Mr Smith's prognosis and that she wants to prioritize his dignity and comfort.     I reviewed the philosophy and scope of our hospice unit, where care is entirely focused on goal-concordant symptom management and patient/family support. Consequently, only medical interventions and treatment which help with that goal will be continued and other routine medical care such as frequent vitals, lab draws and invasive tests/procedures will not be done. She is in agreement with transfer             CBC:   Recent Labs   Lab 06/27/25  0709   WBC 6.00   HGB 7.5*   HCT 23.2*   MCV 89        BMP:  No results for input(s): "GLU", "NA", "K", "CL", "CO2", "BUN", "CREATININE", "CALCIUM", "MG" in the last 24 hours.  LFT:  Lab Results   Component Value Date    AST 42 06/27/2025    ALKPHOS 87 06/27/2025    BILITOT 1.1 (H) 06/27/2025     Albumin:   Albumin   Date Value Ref Range Status   06/27/2025 2.2 (L) 3.5 - 5.2 g/dL Final   06/09/2025 3.9 3.4 - 5.0 g/dL Final   01/29/2016 3.9 3.5 - 5.2 g/dL Final     Protein:   Protein Total   Date Value Ref Range Status   06/27/2025 5.4 (L) 6.0 - 8.4 gm/dL Final "     Total Protein   Date Value Ref Range Status   01/29/2016 6.9 6.0 - 8.4 g/dL Final     Lactic acid:   Lab Results   Component Value Date    LACTATE 1.2 06/24/2025    LACTATE 1.5 06/23/2025       Palliative Exam    Robbin Londono MD  Palliative Medicine  [facility] - Hospice and Palliative Medicine

## 2025-07-01 NOTE — PLAN OF CARE
Problem: Palliative Care  Goal: Enhanced Quality of Life  7/1/2025 1824 by Halima Mckeon RN  Outcome: Progressing  7/1/2025 1823 by Halima Mckeon RN  Outcome: Progressing  7/1/2025 1820 by Halima Mckeon RN  Outcome: Progressing     Problem: Fall Injury Risk  Goal: Absence of Fall and Fall-Related Injury  Outcome: Progressing     Problem: Pain Acute  Goal: Optimal Pain Control and Function  Outcome: Progressing     Problem: End-of-Life Care  Goal: Comfort, Peace and Preserved Dignity  Outcome: Progressing

## 2025-07-01 NOTE — PLAN OF CARE
Problem: Adult Inpatient Plan of Care  Goal: Plan of Care Review  Outcome: Progressing  Flowsheets (Taken 7/1/2025 0613)  Plan of Care Reviewed With: spouse  Goal: Patient-Specific Goal (Individualized)  Outcome: Progressing  Flowsheets (Taken 7/1/2025 0613)  Individualized Care Needs: comfort care  Anxieties, Fears or Concerns: none  Patient/Family-Specific Goals (Include Timeframe): family support  Goal: Absence of Hospital-Acquired Illness or Injury  Outcome: Progressing  Intervention: Identify and Manage Fall Risk  Flowsheets (Taken 7/1/2025 0613)  Safety Promotion/Fall Prevention:   assistive device/personal item within reach   bed alarm set   side rails raised x 2  Intervention: Prevent Skin Injury  Flowsheets (Taken 7/1/2025 0613)  Body Position: turned  Skin Protection: incontinence pads utilized  Device Skin Pressure Protection: absorbent pad utilized/changed

## 2025-07-01 NOTE — ASSESSMENT & PLAN NOTE
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 Went for IVC removal and thrombectomy and patient coded. Subsequently, had poor cognitive recovery. Following goals of care, family opted for compassionate extubation on 6/28, moved to [facility] for comfort-focused care and consideration of inpatient hospice admission on 6/29; [facility] 6/30     Interval update 06/30/2025 - transferred to [facility] yesterday; eligible for [facility] hospice 2/2 increased care needs and inability to meet needs in another setting     Hospice diagnosis- severe hypoxic-ischemic encephalopathy     Need for inpatient care-  Intensive patient and family support. Requiring IV comfort medications for pain behaviors, respiratory distress, and agitation. Likely prognosis of Days to short weeks     Dispo: anticipate EOL care on the unit; anticipate admitting to [facility] level of hospice     Symptom Assessment  - Pain/ pain behaviors: controlled  - Dyspnea/tachypnea: controlled  - Agitation: controlled  - Mentation: minimally responsive     Plan of care:  Tachypnea/dyspnea/pain behaviors:   -non pharm measures such as having bedside fan and cooler room temps  -morphine infusion 1 mg/hr; morphine 2mgIV q1h (6 OME/dose) IV every hour as needed for pain, dyspnea, or pain behaviors     Agitation:   -treat with opioids initially  -followed by lorazepam 1mg q2hr PRN     Uncomfortable secretions:  -glycopyrrolate 0.4mg IV q4hr PRN; positioning     GI:   -will not prioritize at end of life     Patient/family support  -will engage spiritual care and palliative medicine chaplains to assist family in coping and spiritual support in this difficult time

## 2025-07-01 NOTE — SUBJECTIVE & OBJECTIVE
Interval History: Chart reviewed including 24h medication use. Patient resting comfortably in bed, minimally responsive. Discussed with brother (Dr. Smith) and provided updates.     Palliative ROS:  Morphine infusion 1 mg/hr  PRNs: morphine 2mg IV x 2  Pain behaviors- controlled  Respiratory distress- controlled  Agitation- controlled  Uncomfortable secretions- non-burdensome        Past Medical History:   Diagnosis Date    Anxiety     Diabetes mellitus, type 2     GERD (gastroesophageal reflux disease)     Hyperlipidemia     Hypertension     Obesity (BMI 30-39.9)        Past Surgical History:   Procedure Laterality Date    HERNIA REPAIR      ULNAR NERVE REPAIR Left        Review of patient's allergies indicates:   Allergen Reactions    Ether for anesthesia Nausea And Vomiting       Medications:  Continuous Infusions:   morphine  1 mg/hr Intravenous Continuous 1 mL/hr at 06/30/25 1445 1 mg/hr at 06/30/25 1445     Scheduled Meds:   levETIRAcetam (Keppra) IV (PEDS and ADULTS)  750 mg Intravenous Q12H    [START ON 7/3/2025] scopolamine  1 patch Transdermal Q3 Days    white petrolatum-mineral oiL   Both Eyes Q8H     PRN Meds:  Current Facility-Administered Medications:     glycopyrrolate, 0.2 mg, Intravenous, Q4H PRN    lorazepam, 1 mg, Intravenous, Q4H PRN    morphine, 2 mg, Intravenous, Q1H PRN    morphine, 2 mg, Intravenous, Q30 Min PRN    ondansetron, 4 mg, Intravenous, Q4H PRN    Family History       Problem Relation (Age of Onset)    Cancer Father    Diabetes Sister, Brother    Hyperlipidemia Mother    Hypertension Mother          Tobacco Use    Smoking status: Never     Passive exposure: Never    Smokeless tobacco: Never   Substance and Sexual Activity    Alcohol use: Yes     Alcohol/week: 0.0 standard drinks of alcohol     Comment: social    Drug use: No    Sexual activity: Yes     Partners: Female       Review of Systems  Objective:     Vital Signs (Most Recent):  Temp: 98.7 °F (37.1 °C) (07/01/25 0747)  Pulse:  (!) 115 (07/01/25 0747)  Resp: 18 (07/01/25 0747)  BP: (!) 154/73 (07/01/25 0747)  SpO2: (!) 68 % (07/01/25 0747) Vital Signs (24h Range):  Temp:  [98.6 °F (37 °C)-98.7 °F (37.1 °C)] 98.7 °F (37.1 °C)  Pulse:  [115-121] 115  Resp:  [18-27] 18  SpO2:  [68 %-84 %] 68 %  BP: (127-154)/(73-74) 154/73        There is no height or weight on file to calculate BMI.       Physical Exam  Vitals and nursing note reviewed.   Constitutional:       General: He is not in acute distress.     Appearance: He is ill-appearing.      Comments: Older, ill-appearing male lying in bed, minimally responsive, no observed pain behaviors     HENT:      Mouth/Throat:      Mouth: Mucous membranes are moist.   Cardiovascular:      Comments: Radial pulses intact  Pulmonary:      Effort: Pulmonary effort is normal. No respiratory distress.      Comments: unlabored  Abdominal:      General: Abdomen is flat.      Palpations: Abdomen is soft.   Skin:     General: Skin is warm and dry.   Neurological:      Comments: Minimally responsive                Advance Care Planning   Advance Directives:   Do Not Resuscitate Status: Yes      Decision Making:  Family answered questions and Patient unable to communicate due to disease severity/cognitive impairment  Goals of Care: Continued supportive discussion with patient's wife via telephone and at bedside regarding most appropriate care for patient in the setting of neurodevastating injury and end of life care. [place] with very good insight and prognostic awareness, sharing that she understands Mr Smith's prognosis and that she wants to prioritize his dignity and comfort.     I reviewed the philosophy and scope of our hospice unit, where care is entirely focused on goal-concordant symptom management and patient/family support. Consequently, only medical interventions and treatment which help with that goal will be continued and other routine medical care such as frequent vitals, lab draws and invasive  "tests/procedures will not be done. She is in agreement with transfer             CBC:   Recent Labs   Lab 06/27/25  0709   WBC 6.00   HGB 7.5*   HCT 23.2*   MCV 89        BMP:  No results for input(s): "GLU", "NA", "K", "CL", "CO2", "BUN", "CREATININE", "CALCIUM", "MG" in the last 24 hours.  LFT:  Lab Results   Component Value Date    AST 42 06/27/2025    ALKPHOS 87 06/27/2025    BILITOT 1.1 (H) 06/27/2025     Albumin:   Albumin   Date Value Ref Range Status   06/27/2025 2.2 (L) 3.5 - 5.2 g/dL Final   06/09/2025 3.9 3.4 - 5.0 g/dL Final   01/29/2016 3.9 3.5 - 5.2 g/dL Final     Protein:   Protein Total   Date Value Ref Range Status   06/27/2025 5.4 (L) 6.0 - 8.4 gm/dL Final     Total Protein   Date Value Ref Range Status   01/29/2016 6.9 6.0 - 8.4 g/dL Final     Lactic acid:   Lab Results   Component Value Date    LACTATE 1.2 06/24/2025    LACTATE 1.5 06/23/2025       Palliative Exam  "

## 2025-07-02 NOTE — PLAN OF CARE
Problem: Adult Inpatient Plan of Care  Goal: Plan of Care Review  Outcome: Progressing     Problem: Palliative Care  Goal: Enhanced Quality of Life  Outcome: Progressing     Problem: Stroke, Intracerebral Hemorrhage  Goal: Optimal Coping  Outcome: Progressing     Problem: Pain Acute  Goal: Optimal Pain Control and Function  Outcome: Progressing     Problem: End-of-Life Care  Goal: Comfort, Peace and Preserved Dignity  Outcome: Progressing

## 2025-07-02 NOTE — PLAN OF CARE
Problem: Adult Inpatient Plan of Care  Goal: Plan of Care Review  Outcome: Progressing  Flowsheets (Taken 7/2/2025 1848)  Plan of Care Reviewed With: patient  Goal: Absence of Hospital-Acquired Illness or Injury  Outcome: Progressing  Intervention: Identify and Manage Fall Risk  Flowsheets (Taken 7/2/2025 1848)  Safety Promotion/Fall Prevention: side rails raised x 3  Intervention: Prevent Skin Injury  Flowsheets (Taken 7/2/2025 1848)  Body Position: turned  Skin Protection:   incontinence pads utilized   silicone foam dressing in place  Device Skin Pressure Protection:   absorbent pad utilized/changed   positioning supports utilized   pressure points protected   tubing/devices free from skin contact  Intervention: Prevent Infection  Flowsheets (Taken 7/2/2025 1848)  Infection Prevention:   environmental surveillance performed   equipment surfaces disinfected   hand hygiene promoted   single patient room provided     Problem: Palliative Care  Goal: Enhanced Quality of Life  Outcome: Progressing  Intervention: Maximize Comfort  Flowsheets (Taken 7/2/2025 1848)  Pain Management Interventions:   around-the-clock dosing utilized   care clustered   pain management plan reviewed with patient/caregiver  Oral Care:   lip/mouth moisturizer applied   oral rinse provided   suction provided

## 2025-07-02 NOTE — PROGRESS NOTES
[facility]  Palliative Medicine  Progress Note    Patient Name: Franko Smith  MRN: [id number]  Admission Date: 6/30/2025  Hospital Length of Stay: 2 days  Code Status: DNR   Attending Provider: Robbin Londono MD  Consulting Provider: Robbin Londono MD  Primary Care Physician: No, Primary Doctor  Principal Problem:<principal problem not specified>    Patient information was obtained from relative(s) and nurses.      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 Went for IVC removal and thrombectomy and patient coded. Subsequently, had poor cognitive recovery. Following goals of care, family opted for compassionate extubation on 6/28, moved to [facility] for comfort-focused care and consideration of inpatient hospice admission on 6/29; [facility] 6/30     Interval update 7/2/2025 - transferred to [facility] yesterday; remains eligible for [facility] level of hospice 2/2 increased care needs and inability to meet needs in another setting     Hospice diagnosis- severe hypoxic-ischemic encephalopathy     Need for inpatient care-  Intensive patient and family support. Requiring IV comfort medications for pain behaviors, respiratory distress, and agitation. Likely prognosis of Days to short weeks     Dispo: anticipate EOL care on the unit; anticipate admitting to [facility] level of hospice     Symptom Assessment  - Pain/ pain behaviors: controlled  - Dyspnea/tachypnea: controlled  - Agitation: controlled  - Mentation: minimally responsive     Plan of care:  Tachypnea/dyspnea/pain behaviors:   -non pharm measures such as having bedside fan and cooler room temps  -morphine infusion 1 mg/hr; morphine 2mgIV q1h (6 OME/dose) IV every hour as needed for pain,  dyspnea, or pain behaviors     Agitation:   -treat with opioids initially  -followed by lorazepam 1mg q2hr PRN     Uncomfortable secretions:  -glycopyrrolate 0.4mg IV q4hr PRN; positioning     GI:   -will not prioritize at end of life     Patient/family support  -will engage spiritual care and palliative medicine chaplains to assist family in coping and spiritual support in this difficult time        Subjective:     Chief Complaint: No chief complaint on file.      HPI:   Mr Franko Smith is an 79 year old male with prostate CA (diagnosed 2023 s/p radiation), PE, who [place] [facility] with increased nausea/vomiting. 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 Initial workup revealed possible clot burden around IVC. During attempted IVC removal and thrombectomy, patient coded. Subsequently intubated and sedated. Exam remained poor for the following days in the ICU. Following goals of care, patient underwent compassionate extubation on 6/28. Palliative medicine consulted for [facility] transfer. Transferred to [facility] on 6/29 for comfort-focused care and admitted to [facility] level of hospice care 6/30    Hospital Course:  No notes on file    Interval History: Chart reviewed including 24h medication use. Patient resting comfortably in bed.  Unresponsive    No pain behaviors on morphine infusion; 3 prn boluses of tachpnea    Palliative ROS:  Morphine infusion 1 mg/hr  PRNs: morphine 2mg IV x 3  Pain behaviors- controlled  Respiratory distress- controlled  Agitation- controlled  Uncomfortable secretions- non-burdensome        Past Medical History:   Diagnosis Date    Anxiety     Diabetes mellitus, type 2     GERD (gastroesophageal reflux disease)     Hyperlipidemia     Hypertension     Obesity (BMI 30-39.9)        Past Surgical History:   Procedure Laterality Date    HERNIA REPAIR      ULNAR NERVE REPAIR Left         Review of patient's allergies indicates:   Allergen Reactions    Ether for anesthesia Nausea And Vomiting       Medications:  Continuous Infusions:   morphine  1 mg/hr Intravenous Continuous 1 mL/hr at 06/30/25 1445 1 mg/hr at 06/30/25 1445     Scheduled Meds:   [START ON 7/3/2025] scopolamine  1 patch Transdermal Q3 Days    white petrolatum-mineral oiL   Both Eyes Q8H     PRN Meds:  Current Facility-Administered Medications:     glycopyrrolate, 0.2 mg, Intravenous, Q4H PRN    lorazepam, 2 mg, Intravenous, Q30 Min PRN    morphine, 2 mg, Intravenous, Q1H PRN    morphine, 2 mg, Intravenous, Q30 Min PRN    ondansetron, 4 mg, Intravenous, Q4H PRN    Family History       Problem Relation (Age of Onset)    Cancer Father    Diabetes Sister, Brother    Hyperlipidemia Mother    Hypertension Mother          Tobacco Use    Smoking status: Never     Passive exposure: Never    Smokeless tobacco: Never   Substance and Sexual Activity    Alcohol use: Yes     Alcohol/week: 0.0 standard drinks of alcohol     Comment: social    Drug use: No    Sexual activity: Yes     Partners: Female       Review of Systems  Objective:     Vital Signs (Most Recent):  Temp: 98.1 °F (36.7 °C) (07/02/25 0753)  Pulse: (!) 116 (07/02/25 0753)  Resp: (!) 22 (07/02/25 1102)  BP: (!) 143/69 (07/02/25 0753)  SpO2: (!) 84 % (07/02/25 0753) Vital Signs (24h Range):  Temp:  [96.7 °F (35.9 °C)-98.1 °F (36.7 °C)] 98.1 °F (36.7 °C)  Pulse:  [110-116] 116  Resp:  [18-28] 22  SpO2:  [84 %-94 %] 84 %  BP: (141-143)/(69-75) 143/69        There is no height or weight on file to calculate BMI.       Physical Exam  Vitals and nursing note reviewed.   Constitutional:       General: He is not in acute distress.     Appearance: He is ill-appearing.      Comments: Older, ill-appearing male lying in bed, minimally responsive, no observed pain behaviors     HENT:      Mouth/Throat:      Mouth: Mucous membranes are moist.   Cardiovascular:      Comments: Radial pulses  "intact  Pulmonary:      Effort: Pulmonary effort is normal. No respiratory distress.      Comments: unlabored  Abdominal:      General: Abdomen is flat.      Palpations: Abdomen is soft.   Skin:     General: Skin is warm and dry.   Neurological:      Comments: Minimally responsive                Advance Care Planning  Advance Directives:   Do Not Resuscitate Status: Yes      Decision Making:  Family answered questions and Patient unable to communicate due to disease severity/cognitive impairment  Goals of Care: Continued supportive discussion with patient's wife via telephone and at bedside regarding most appropriate care for patient in the setting of neurodevastating injury and end of life care. [place] with very good insight and prognostic awareness, sharing that she understands Mr Smith's prognosis and that she wants to prioritize his dignity and comfort.     I reviewed the philosophy and scope of our hospice unit, where care is entirely focused on goal-concordant symptom management and patient/family support. Consequently, only medical interventions and treatment which help with that goal will be continued and other routine medical care such as frequent vitals, lab draws and invasive tests/procedures will not be done. She is in agreement with transfer             CBC:   Recent Labs   Lab 06/27/25  0709   WBC 6.00   HGB 7.5*   HCT 23.2*   MCV 89        BMP:  No results for input(s): "GLU", "NA", "K", "CL", "CO2", "BUN", "CREATININE", "CALCIUM", "MG" in the last 24 hours.  LFT:  Lab Results   Component Value Date    AST 42 06/27/2025    ALKPHOS 87 06/27/2025    BILITOT 1.1 (H) 06/27/2025     Albumin:   Albumin   Date Value Ref Range Status   06/27/2025 2.2 (L) 3.5 - 5.2 g/dL Final   06/09/2025 3.9 3.4 - 5.0 g/dL Final   01/29/2016 3.9 3.5 - 5.2 g/dL Final     Protein:   Protein Total   Date Value Ref Range Status   06/27/2025 5.4 (L) 6.0 - 8.4 gm/dL Final     Total Protein   Date Value Ref Range Status "   01/29/2016 6.9 6.0 - 8.4 g/dL Final     Lactic acid:   Lab Results   Component Value Date    LACTATE 1.2 06/24/2025    LACTATE 1.5 06/23/2025       Palliative Exam    Robbin Londono MD  Palliative Medicine  [facility] - H[facility] and Palliative Medicine

## 2025-07-02 NOTE — NURSING
0715 - Report received. Care assumed. Assessment per flowsheet. Pt appears comfortable, in no distress.     0957 - Spoke to pt's wife. Update given of current pt status. She reports that pt's brother from [place] and family  will visit today.    1400 - Multiple family members at bedside.

## 2025-07-02 NOTE — SUBJECTIVE & OBJECTIVE
Interval History: Chart reviewed including 24h medication use. Patient resting comfortably in bed.  Unresponsive    No pain behaviors on morphine infusion; 3 prn boluses of tachpnea    Palliative ROS:  Morphine infusion 1 mg/hr  PRNs: morphine 2mg IV x 3  Pain behaviors- controlled  Respiratory distress- controlled  Agitation- controlled  Uncomfortable secretions- non-burdensome        Past Medical History:   Diagnosis Date    Anxiety     Diabetes mellitus, type 2     GERD (gastroesophageal reflux disease)     Hyperlipidemia     Hypertension     Obesity (BMI 30-39.9)        Past Surgical History:   Procedure Laterality Date    HERNIA REPAIR      ULNAR NERVE REPAIR Left        Review of patient's allergies indicates:   Allergen Reactions    Ether for anesthesia Nausea And Vomiting       Medications:  Continuous Infusions:   morphine  1 mg/hr Intravenous Continuous 1 mL/hr at 06/30/25 1445 1 mg/hr at 06/30/25 1445     Scheduled Meds:   [START ON 7/3/2025] scopolamine  1 patch Transdermal Q3 Days    white petrolatum-mineral oiL   Both Eyes Q8H     PRN Meds:  Current Facility-Administered Medications:     glycopyrrolate, 0.2 mg, Intravenous, Q4H PRN    lorazepam, 2 mg, Intravenous, Q30 Min PRN    morphine, 2 mg, Intravenous, Q1H PRN    morphine, 2 mg, Intravenous, Q30 Min PRN    ondansetron, 4 mg, Intravenous, Q4H PRN    Family History       Problem Relation (Age of Onset)    Cancer Father    Diabetes Sister, Brother    Hyperlipidemia Mother    Hypertension Mother          Tobacco Use    Smoking status: Never     Passive exposure: Never    Smokeless tobacco: Never   Substance and Sexual Activity    Alcohol use: Yes     Alcohol/week: 0.0 standard drinks of alcohol     Comment: social    Drug use: No    Sexual activity: Yes     Partners: Female       Review of Systems  Objective:     Vital Signs (Most Recent):  Temp: 98.1 °F (36.7 °C) (07/02/25 0753)  Pulse: (!) 116 (07/02/25 0753)  Resp: (!) 22 (07/02/25 1102)  BP: (!)  143/69 (07/02/25 0753)  SpO2: (!) 84 % (07/02/25 0753) Vital Signs (24h Range):  Temp:  [96.7 °F (35.9 °C)-98.1 °F (36.7 °C)] 98.1 °F (36.7 °C)  Pulse:  [110-116] 116  Resp:  [18-28] 22  SpO2:  [84 %-94 %] 84 %  BP: (141-143)/(69-75) 143/69        There is no height or weight on file to calculate BMI.       Physical Exam  Vitals and nursing note reviewed.   Constitutional:       General: He is not in acute distress.     Appearance: He is ill-appearing.      Comments: Older, ill-appearing male lying in bed, minimally responsive, no observed pain behaviors     HENT:      Mouth/Throat:      Mouth: Mucous membranes are moist.   Cardiovascular:      Comments: Radial pulses intact  Pulmonary:      Effort: Pulmonary effort is normal. No respiratory distress.      Comments: unlabored  Abdominal:      General: Abdomen is flat.      Palpations: Abdomen is soft.   Skin:     General: Skin is warm and dry.   Neurological:      Comments: Minimally responsive                Advance Care Planning   Advance Directives:   Do Not Resuscitate Status: Yes      Decision Making:  Family answered questions and Patient unable to communicate due to disease severity/cognitive impairment  Goals of Care: Continued supportive discussion with patient's wife via telephone and at bedside regarding most appropriate care for patient in the setting of neurodevastating injury and end of life care. [place] with very good insight and prognostic awareness, sharing that she understands Mr Smith's prognosis and that she wants to prioritize his dignity and comfort.     I reviewed the philosophy and scope of our hospice unit, where care is entirely focused on goal-concordant symptom management and patient/family support. Consequently, only medical interventions and treatment which help with that goal will be continued and other routine medical care such as frequent vitals, lab draws and invasive tests/procedures will not be done. She is in agreement with  "transfer             CBC:   Recent Labs   Lab 06/27/25  0709   WBC 6.00   HGB 7.5*   HCT 23.2*   MCV 89        BMP:  No results for input(s): "GLU", "NA", "K", "CL", "CO2", "BUN", "CREATININE", "CALCIUM", "MG" in the last 24 hours.  LFT:  Lab Results   Component Value Date    AST 42 06/27/2025    ALKPHOS 87 06/27/2025    BILITOT 1.1 (H) 06/27/2025     Albumin:   Albumin   Date Value Ref Range Status   06/27/2025 2.2 (L) 3.5 - 5.2 g/dL Final   06/09/2025 3.9 3.4 - 5.0 g/dL Final   01/29/2016 3.9 3.5 - 5.2 g/dL Final     Protein:   Protein Total   Date Value Ref Range Status   06/27/2025 5.4 (L) 6.0 - 8.4 gm/dL Final     Total Protein   Date Value Ref Range Status   01/29/2016 6.9 6.0 - 8.4 g/dL Final     Lactic acid:   Lab Results   Component Value Date    LACTATE 1.2 06/24/2025    LACTATE 1.5 06/23/2025       Palliative Exam  "

## 2025-07-02 NOTE — ASSESSMENT & PLAN NOTE
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 Went for IVC removal and thrombectomy and patient coded. Subsequently, had poor cognitive recovery. Following goals of care, family opted for compassionate extubation on 6/28, moved to [facility] for comfort-focused care and consideration of inpatient hospice admission on 6/29; [facility] 6/30     Interval update 7/2/2025 - transferred to [facility] yesterday; remains eligible for [facility] level of hospice 2/2 increased care needs and inability to meet needs in another setting     Hospice diagnosis- severe hypoxic-ischemic encephalopathy     Need for inpatient care-  Intensive patient and family support. Requiring IV comfort medications for pain behaviors, respiratory distress, and agitation. Likely prognosis of Days to short weeks     Dispo: anticipate EOL care on the unit; anticipate admitting to [facility] level of hospice     Symptom Assessment  - Pain/ pain behaviors: controlled  - Dyspnea/tachypnea: controlled  - Agitation: controlled  - Mentation: minimally responsive     Plan of care:  Tachypnea/dyspnea/pain behaviors:   -non pharm measures such as having bedside fan and cooler room temps  -morphine infusion 1 mg/hr; morphine 2mgIV q1h (6 OME/dose) IV every hour as needed for pain, dyspnea, or pain behaviors     Agitation:   -treat with opioids initially  -followed by lorazepam 1mg q2hr PRN     Uncomfortable secretions:  -glycopyrrolate 0.4mg IV q4hr PRN; positioning     GI:   -will not prioritize at end of life     Patient/family support  -will engage spiritual care and palliative medicine chaplains to assist family in coping and spiritual support in this difficult time

## 2025-07-03 NOTE — PROGRESS NOTES
Please prioritize patient's comfort and preferences over strict nutritional goals at this time. Offer small, frequent meals, texture modifications, and respect the patient's desire to eat or not eat. Also ensure proper hydration with small sips of fluids periodically.  Patient on comfort measures - honor food preferences. Consult RD if nutrition intervention required/RD available.

## 2025-07-03 NOTE — PLAN OF CARE
Problem: Palliative Care  Goal: Enhanced Quality of Life  7/3/2025 1845 by Britton Sommer RN  Outcome: Progressing  7/3/2025 1836 by Britton Sommer RN  Outcome: Progressing     Problem: Skin Injury Risk Increased  Goal: Skin Health and Integrity  7/3/2025 1845 by Britton Sommer RN  Outcome: Progressing  7/3/2025 1836 by Britton Sommer RN  Outcome: Progressing     Problem: Pain Acute  Goal: Optimal Pain Control and Function  Outcome: Progressing     Problem: End-of-Life Care  Goal: Comfort, Peace and Preserved Dignity  7/3/2025 1845 by Britton Sommer RN  Outcome: Progressing  7/3/2025 1836 by Britton Sommer RN  Outcome: Progressing

## 2025-07-03 NOTE — SUBJECTIVE & OBJECTIVE
Interval History: Chart reviewed including 24h medication use. Patient resting comfortably in bed.  Unresponsive; iupper airway noises    No pain behaviors on morphine infusion; 4 prn boluses of tachpnea    Palliative ROS:  Morphine infusion 1 mg/hr  PRNs: morphine 2mg IV x 3  Pain behaviors- controlled  Respiratory distress- controlled  Agitation- controlled  Uncomfortable secretions- non-burdensome        Past Medical History:   Diagnosis Date    Anxiety     Diabetes mellitus, type 2     GERD (gastroesophageal reflux disease)     Hyperlipidemia     Hypertension     Obesity (BMI 30-39.9)        Past Surgical History:   Procedure Laterality Date    HERNIA REPAIR      ULNAR NERVE REPAIR Left        Review of patient's allergies indicates:   Allergen Reactions    Ether for anesthesia Nausea And Vomiting       Medications:  Continuous Infusions:   morphine  1 mg/hr Intravenous Continuous 1 mL/hr at 07/03/25 1507 1 mg/hr at 07/03/25 1507     Scheduled Meds:   scopolamine  1 patch Transdermal Q3 Days    white petrolatum-mineral oiL   Both Eyes Q8H     PRN Meds:  Current Facility-Administered Medications:     glycopyrrolate, 0.2 mg, Intravenous, Q4H PRN    lorazepam, 2 mg, Intravenous, Q30 Min PRN    morphine, 2 mg, Intravenous, Q1H PRN    morphine, 2 mg, Intravenous, Q30 Min PRN    ondansetron, 4 mg, Intravenous, Q4H PRN    Family History       Problem Relation (Age of Onset)    Cancer Father    Diabetes Sister, Brother    Hyperlipidemia Mother    Hypertension Mother          Tobacco Use    Smoking status: Never     Passive exposure: Never    Smokeless tobacco: Never   Substance and Sexual Activity    Alcohol use: Yes     Alcohol/week: 0.0 standard drinks of alcohol     Comment: social    Drug use: No    Sexual activity: Yes     Partners: Female       Review of Systems  Objective:     Vital Signs (Most Recent):  Temp: 98.2 °F (36.8 °C) (07/03/25 1030)  Pulse: (!) 122 (07/03/25 1030)  Resp: (!) 22 (07/03/25 1506)  BP: (!)  140/71 (07/03/25 1030)  SpO2: (!) 58 % (07/03/25 1030) Vital Signs (24h Range):  Temp:  [98.2 °F (36.8 °C)-98.5 °F (36.9 °C)] 98.2 °F (36.8 °C)  Pulse:  [116-122] 122  Resp:  [21-33] 22  SpO2:  [58 %-84 %] 58 %  BP: (133-140)/(67-71) 140/71        There is no height or weight on file to calculate BMI.       Physical Exam  Vitals and nursing note reviewed.   Constitutional:       General: He is not in acute distress.     Appearance: He is ill-appearing.      Comments: Older, ill-appearing male lying in bed, minimally responsive, no observed pain behaviors     HENT:      Mouth/Throat:      Mouth: Mucous membranes are moist.   Cardiovascular:      Comments: Radial pulses intact  Pulmonary:      Effort: Pulmonary effort is normal. No respiratory distress.      Comments: unlabored  Abdominal:      General: Abdomen is flat.      Palpations: Abdomen is soft.   Skin:     General: Skin is warm and dry.   Neurological:      Comments: Minimally responsive                Advance Care Planning   Advance Directives:   Do Not Resuscitate Status: Yes      Decision Making:  Family answered questions and Patient unable to communicate due to disease severity/cognitive impairment  Goals of Care: Continued supportive discussion with patient's wife via telephone and at bedside regarding most appropriate care for patient in the setting of neurodevastating injury and end of life care. [place] with very good insight and prognostic awareness, sharing that she understands Mr Smith's prognosis and that she wants to prioritize his dignity and comfort.     I reviewed the philosophy and scope of our hospice unit, where care is entirely focused on goal-concordant symptom management and patient/family support. Consequently, only medical interventions and treatment which help with that goal will be continued and other routine medical care such as frequent vitals, lab draws and invasive tests/procedures will not be done. She is in agreement with  "transfer             CBC:   Recent Labs   Lab 06/27/25  0709   WBC 6.00   HGB 7.5*   HCT 23.2*   MCV 89        BMP:  No results for input(s): "GLU", "NA", "K", "CL", "CO2", "BUN", "CREATININE", "CALCIUM", "MG" in the last 24 hours.  LFT:  Lab Results   Component Value Date    AST 42 06/27/2025    ALKPHOS 87 06/27/2025    BILITOT 1.1 (H) 06/27/2025     Albumin:   Albumin   Date Value Ref Range Status   06/27/2025 2.2 (L) 3.5 - 5.2 g/dL Final   06/09/2025 3.9 3.4 - 5.0 g/dL Final   01/29/2016 3.9 3.5 - 5.2 g/dL Final     Protein:   Protein Total   Date Value Ref Range Status   06/27/2025 5.4 (L) 6.0 - 8.4 gm/dL Final     Total Protein   Date Value Ref Range Status   01/29/2016 6.9 6.0 - 8.4 g/dL Final     Lactic acid:   Lab Results   Component Value Date    LACTATE 1.2 06/24/2025    LACTATE 1.5 06/23/2025       Palliative Exam  "

## 2025-07-03 NOTE — PROGRESS NOTES
[facility]  Palliative Medicine  Progress Note    Patient Name: Franko Smith  MRN: [id number]  Admission Date: 6/30/2025  Hospital Length of Stay: 3 days  Code Status: DNR   Attending Provider: Robbin Londono MD  Consulting Provider: Robbin Londono MD  Primary Care Physician: No, Primary Doctor  Principal Problem:<principal problem not specified>    Patient information was obtained from relative(s), past medical records, and nursing.      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 Went for IVC removal and thrombectomy and patient coded. Subsequently, had poor cognitive recovery. Following goals of care, family opted for compassionate extubation on 6/28, moved to HPU for comfort-focused care and consideration of inpatient hospice admission on 6/29; [facility] 6/30     Interval update 7/3/2025 - remains eligible for [facility] level of hospice 2/2 increased care needs and inability to meet needs in another setting     Hospice diagnosis- severe hypoxic-ischemic encephalopathy     Need for inpatient care-  Intensive patient and family support. Requiring IV comfort medications for pain behaviors, respiratory distress, and agitation. Likely prognosis of Days to short weeks     Dispo: anticipate EOL care on the unit; anticipate admitting to [facility] level of hospice     Symptom Assessment  - Pain/ pain behaviors: controlled  - Dyspnea/tachypnea: controlled  - Agitation: controlled  - Mentation: minimally responsive     Plan of care:  Tachypnea/dyspnea/pain behaviors:   -non pharm measures such as having bedside fan and cooler room temps  -morphine infusion 1 mg/hr; morphine 2mgIV q1h (6 OME/dose) IV every hour as needed for pain, dyspnea, or  pain behaviors     Agitation:   -treat with opioids initially  -followed by lorazepam 1mg q2hr PRN     Uncomfortable secretions:  -glycopyrrolate 0.4mg IV q4hr PRN; positioning     GI:   -will not prioritize at end of life     Patient/family support  -will engage spiritual care and palliative medicine chaplains to assist family in coping and spiritual support in this difficult time        Subjective:     Chief Complaint: No chief complaint on file.      HPI:   Mr Franko Smith is an 79 year old male with prostate CA (diagnosed 2023 s/p radiation), PE, who presents to [facility] ED from [facility] rehab with increased nausea/vomiting. 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 Initial workup revealed possible clot burden around IVC. During attempted IVC removal and thrombectomy, patient coded. Subsequently intubated and sedated. Exam remained poor for the following days in the ICU. Following goals of care, patient underwent compassionate extubation on 6/28. Palliative medicine consulted for HPU transfer. Transferred to HPU on 6/29 for comfort-focused care and admitted to [facility] level of hospice care 6/30    Hospital Course:  No notes on file    Interval History: Chart reviewed including 24h medication use. Patient resting comfortably in bed.  Unresponsive; iupper airway noises    No pain behaviors on morphine infusion; 4 prn boluses of tachpnea    Palliative ROS:  Morphine infusion 1 mg/hr  PRNs: morphine 2mg IV x 3  Pain behaviors- controlled  Respiratory distress- controlled  Agitation- controlled  Uncomfortable secretions- non-burdensome        Past Medical History:   Diagnosis Date    Anxiety     Diabetes mellitus, type 2     GERD (gastroesophageal reflux disease)     Hyperlipidemia     Hypertension     Obesity (BMI 30-39.9)        Past Surgical History:   Procedure Laterality Date    HERNIA REPAIR      ULNAR NERVE  REPAIR Left        Review of patient's allergies indicates:   Allergen Reactions    Ether for anesthesia Nausea And Vomiting       Medications:  Continuous Infusions:   morphine  1 mg/hr Intravenous Continuous 1 mL/hr at 07/03/25 1507 1 mg/hr at 07/03/25 1507     Scheduled Meds:   scopolamine  1 patch Transdermal Q3 Days    white petrolatum-mineral oiL   Both Eyes Q8H     PRN Meds:  Current Facility-Administered Medications:     glycopyrrolate, 0.2 mg, Intravenous, Q4H PRN    lorazepam, 2 mg, Intravenous, Q30 Min PRN    morphine, 2 mg, Intravenous, Q1H PRN    morphine, 2 mg, Intravenous, Q30 Min PRN    ondansetron, 4 mg, Intravenous, Q4H PRN    Family History       Problem Relation (Age of Onset)    Cancer Father    Diabetes Sister, Brother    Hyperlipidemia Mother    Hypertension Mother          Tobacco Use    Smoking status: Never     Passive exposure: Never    Smokeless tobacco: Never   Substance and Sexual Activity    Alcohol use: Yes     Alcohol/week: 0.0 standard drinks of alcohol     Comment: social    Drug use: No    Sexual activity: Yes     Partners: Female       Review of Systems  Objective:     Vital Signs (Most Recent):  Temp: 98.2 °F (36.8 °C) (07/03/25 1030)  Pulse: (!) 122 (07/03/25 1030)  Resp: (!) 22 (07/03/25 1506)  BP: (!) 140/71 (07/03/25 1030)  SpO2: (!) 58 % (07/03/25 1030) Vital Signs (24h Range):  Temp:  [98.2 °F (36.8 °C)-98.5 °F (36.9 °C)] 98.2 °F (36.8 °C)  Pulse:  [116-122] 122  Resp:  [21-33] 22  SpO2:  [58 %-84 %] 58 %  BP: (133-140)/(67-71) 140/71        There is no height or weight on file to calculate BMI.       Physical Exam  Vitals and nursing note reviewed.   Constitutional:       General: He is not in acute distress.     Appearance: He is ill-appearing.      Comments: Older, ill-appearing male lying in bed, minimally responsive, no observed pain behaviors     HENT:      Mouth/Throat:      Mouth: Mucous membranes are moist.   Cardiovascular:      Comments: Radial pulses  "intact  Pulmonary:      Effort: Pulmonary effort is normal. No respiratory distress.      Comments: unlabored  Abdominal:      General: Abdomen is flat.      Palpations: Abdomen is soft.   Skin:     General: Skin is warm and dry.   Neurological:      Comments: Minimally responsive                Advance Care Planning  Advance Directives:   Do Not Resuscitate Status: Yes      Decision Making:  Family answered questions and Patient unable to communicate due to disease severity/cognitive impairment  Goals of Care: Continued supportive discussion with patient's wife via telephone and at bedside regarding most appropriate care for patient in the setting of neurodevastating injury and end of life care. [place] with very good insight and prognostic awareness, sharing that she understands Mr Smith's prognosis and that she wants to prioritize his dignity and comfort.     I reviewed the philosophy and scope of our hospice unit, where care is entirely focused on goal-concordant symptom management and patient/family support. Consequently, only medical interventions and treatment which help with that goal will be continued and other routine medical care such as frequent vitals, lab draws and invasive tests/procedures will not be done. She is in agreement with transfer             CBC:   Recent Labs   Lab 06/27/25  0709   WBC 6.00   HGB 7.5*   HCT 23.2*   MCV 89        BMP:  No results for input(s): "GLU", "NA", "K", "CL", "CO2", "BUN", "CREATININE", "CALCIUM", "MG" in the last 24 hours.  LFT:  Lab Results   Component Value Date    AST 42 06/27/2025    ALKPHOS 87 06/27/2025    BILITOT 1.1 (H) 06/27/2025     Albumin:   Albumin   Date Value Ref Range Status   06/27/2025 2.2 (L) 3.5 - 5.2 g/dL Final   06/09/2025 3.9 3.4 - 5.0 g/dL Final   01/29/2016 3.9 3.5 - 5.2 g/dL Final     Protein:   Protein Total   Date Value Ref Range Status   06/27/2025 5.4 (L) 6.0 - 8.4 gm/dL Final     Total Protein   Date Value Ref Range Status "   01/29/2016 6.9 6.0 - 8.4 g/dL Final     Lactic acid:   Lab Results   Component Value Date    LACTATE 1.2 06/24/2025    LACTATE 1.5 06/23/2025       Palliative Exam    Robbin Londono MD  Palliative Medicine  [facility] - Hospice and Palliative Medicine

## 2025-07-03 NOTE — PLAN OF CARE
Problem: Palliative Care  Goal: Enhanced Quality of Life  Outcome: Progressing     Problem: Stroke, Intracerebral Hemorrhage  Goal: Optimal Coping  Outcome: Progressing     Problem: Pain Acute  Goal: Optimal Pain Control and Function  Outcome: Progressing     Problem: End-of-Life Care  Goal: Comfort, Peace and Preserved Dignity  Outcome: Progressing

## 2025-07-03 NOTE — ASSESSMENT & PLAN NOTE
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 Went for IVC removal and thrombectomy and patient coded. Subsequently, had poor cognitive recovery. Following goals of care, family opted for compassionate extubation on 6/28, moved to HPU for comfort-focused care and consideration of inpatient hospice admission on 6/29; [facility] 6/30     Interval update 7/3/2025 - remains eligible for [facility] level of hospice 2/2 increased care needs and inability to meet needs in another setting     Hospice diagnosis- severe hypoxic-ischemic encephalopathy     Need for inpatient care-  Intensive patient and family support. Requiring IV comfort medications for pain behaviors, respiratory distress, and agitation. Likely prognosis of Days to short weeks     Dispo: anticipate EOL care on the unit; anticipate admitting to [facility] level of hospice     Symptom Assessment  - Pain/ pain behaviors: controlled  - Dyspnea/tachypnea: controlled  - Agitation: controlled  - Mentation: minimally responsive     Plan of care:  Tachypnea/dyspnea/pain behaviors:   -non pharm measures such as having bedside fan and cooler room temps  -morphine infusion 1 mg/hr; morphine 2mgIV q1h (6 OME/dose) IV every hour as needed for pain, dyspnea, or pain behaviors     Agitation:   -treat with opioids initially  -followed by lorazepam 1mg q2hr PRN     Uncomfortable secretions:  -glycopyrrolate 0.4mg IV q4hr PRN; positioning     GI:   -will not prioritize at end of life     Patient/family support  -will engage spiritual care and palliative medicine chaplains to assist family in coping and spiritual support in this difficult time

## 2025-07-04 NOTE — NURSING
Pt's RR 40-42 and tremoring in hands and feet.  Pt given morphine bolus x3 for dyspnea and lorazepam given for tremoring.  Glyco given for secretions.

## 2025-07-04 NOTE — ASSESSMENT & PLAN NOTE
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 Went for IVC removal and thrombectomy and patient coded. Subsequently, had poor cognitive recovery. Following goals of care, family opted for compassionate extubation on 6/28, moved to HPU for comfort-focused care and consideration of inpatient hospice admission on 6/29; [facility] 6/30     Interval update   7/3/2025 - remains eligible for [facility] level of hospice 2/2 increased care needs and inability to meet needs in another setting  7/4/2025 - very symptomatic this morning, had fever >102F, RR in 40s, unresponsive, family at bedside (wife and sister) initial plan was to change medications from mornine to hydromorphone and continue ativan   At 9:50 I was called in to the room as pt was no longer breathing, after assessment pt was pronounced dead at 9:53am      Hospice diagnosis- severe hypoxic-ischemic encephalopathy     Need for inpatient care-  Intensive patient and family support. Requiring IV comfort medications for pain behaviors, respiratory distress, and agitation. Likely prognosis of Days to short weeks     Dispo: anticipate EOL care on the unit; anticipate admitting to [facility] level of hospice     Symptom Assessment  - Pain/ pain behaviors: controlled  - Dyspnea/tachypnea: controlled  - Agitation: controlled  - Mentation: minimally responsive     Plan of care:  Tachypnea/dyspnea/pain behaviors:   -non pharm measures such as having bedside fan and cooler room temps  -morphine infusion 1 mg/hr; morphine 2mgIV q1h (6 OME/dose) IV every hour as needed for pain, dyspnea, or pain behaviors     Agitation:   - lorazepam 1mg q2hr PRN     Uncomfortable secretions:  -glycopyrrolate 0.4mg IV q4hr PRN; positioning     GI:   -will not prioritize at end of life     Patient/family support  -will  engage spiritual care and palliative medicine chaplains to assist family in coping and spiritual support in this difficult time  - and father Jamison were able to see the family

## 2025-07-04 NOTE — CHAPLAIN
Patient: Franko Smith  MRN: [id number]  : 1945  Age: 79 y.o.  Legal sex: male   Hospital Length of Stay: 4 days  Code Status: DNR   Attending Provider: Robbin Londono MD  Principal Problem: <principal problem not specified>  Patient's Mandaen: Alevism  Length of my visit: 20 min  Purpose of visit:   hospice      provided grief care & decedent care to family, who are processing the death of their loved one.  made referral to Father Jamison per their request.         Rev. Calin Irizarry, [facility], [facility], Phone [phone]  Advanced Practice Board-Certified  in Hospice and Palliative Care, Yoruba+     support is available and on-site . Please call the on-call  for any emergent spiritual care needs @ [phone]

## 2025-07-04 NOTE — DISCHARGE SUMMARY
[facility]  Palliative Medicine  Discharge Summary      Patient Name: Franko Smith  MRN: [id number]  Admission Date: 6/30/2025  Hospital Length of Stay: 4 days  Discharge Date and Time: 07/04/2025 10:48 AM  Attending Physician: Yessy Antonio MD   Discharging Provider: Yessy Antonio MD  Primary Care Provider: Hayley, Primary Doctor    HPI:   Mr Franko Smith is an 79 year old male with prostate CA (diagnosed 2023 s/p radiation), PE, who [place] [facility] from [facility] with increased nausea/vomiting. 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 Initial workup revealed possible clot burden around IVC. During attempted IVC removal and thrombectomy, patient coded. Subsequently intubated and sedated. Exam remained poor for the following days in the ICU. Following goals of care, patient underwent compassionate extubation on 6/28. Palliative medicine consulted for HPU transfer. Transferred to HPU on 6/29 for comfort-focused care and admitted to [facility] level of hospice care 6/30    * No surgery found *      Hospital Course:   No notes on file     Goals of Care Treatment Preferences:  Code Status: DNR          What is most important right now is to focus on quality of life, even if it means sacrificing a little time, improvement in condition but with limits to invasive therapies.         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 Went for IVC removal and thrombectomy and patient coded. Subsequently, had poor cognitive recovery. Following goals of care, family opted for compassionate extubation on 6/28, moved to HPU for  comfort-focused care and consideration of inpatient hospice admission on ; [facility]      Interval update   7/3/2025 - remains eligible for [facility] level of hospice 2/2 increased care needs and inability to meet needs in another setting  2025 - very symptomatic this morning, had fever >102F, RR in 40s, unresponsive, family at bedside (wife and sister) initial plan was to change medications from mornine to hydromorphone and continue ativan   At 9:50 I was called in to the room as pt was no longer breathing, after assessment pt was pronounced dead at 9:53am      Hospice diagnosis- severe hypoxic-ischemic encephalopathy     Need for inpatient care-  Intensive patient and family support. Requiring IV comfort medications for pain behaviors, respiratory distress, and agitation. Likely prognosis of Days to short weeks     Dispo: anticipate EOL care on the unit; anticipate admitting to [facility] level of hospice     Symptom Assessment  - Pain/ pain behaviors: controlled  - Dyspnea/tachypnea: controlled  - Agitation: controlled  - Mentation: minimally responsive     Plan of care:  Tachypnea/dyspnea/pain behaviors:   -non pharm measures such as having bedside fan and cooler room temps  -morphine infusion 1 mg/hr; morphine 2mgIV q1h (6 OME/dose) IV every hour as needed for pain, dyspnea, or pain behaviors     Agitation:   - lorazepam 1mg q2hr PRN     Uncomfortable secretions:  -glycopyrrolate 0.4mg IV q4hr PRN; positioning     GI:   -will not prioritize at end of life     Patient/family support  -will engage spiritual care and palliative medicine chaplains to assist family in coping and spiritual support in this difficult time  - and father Jamison were able to see the family      Final Active Diagnoses:    Diagnosis Date Noted POA    Comfort measures only status [Z51.5] 2025 Not Applicable      Problems Resolved During this Admission:       Discharged Condition:     Disposition:     Follow Up:    Patient  Instructions:   No discharge procedures on file.    Significant Diagnostic Studies: N/A    Pending Diagnostic Studies:       None           Medications:  None    Indwelling Lines/Drains at time of discharge:   Lines/Drains/Airways       Drain  Duration                  Urethral Catheter 06/26/25 2109 7 days                    Time spent on the discharge of patient: 30 minutes  Patient was seen and examined on the date of discharge and determined to be suitable for discharge.    Yessy Antonio MD  Department of Palliative Medicine  [facility]

## 2025-07-04 NOTE — NURSING
Pt has no detectable heart rate or respirations.  MD notified.  Family at bedside along with [facility] nurse.   Tim notified.

## 2025-07-04 NOTE — NURSING
Call placed to wife and brother Karl to inform of changes, current comfort level, symptom management needs, decline, and possible time of death.

## 2025-07-04 NOTE — NURSING
19:30 Bedside and medication verification with LILIA Jarquin. Patient is resting both eyes are closed.    19:55 Audible gurgling and coughing noted. PRN Glycopyrrolate given as ordered. Patient also showed increased WOB with 29bpm  . PRN Morphine given as ordered. Turned the patient to the side to help with the breathing. Monitoring for any changes.    22:18 Increased WOB with 30 bpm noted. PRN Morphine bolus from bag given as ordered. Monitoring for any changes.    23:44 Patient respiration at 32 bpm. PRN Morphine bolus from bag given.    2:58 PRN Morphine from bag given as ordered due to increased WOB. Ongoing monitoring and care.    4:49 PRN Morphine bolus from bag given due to increase WOB, Ongoing monitoring.    6:48 PRN Morphine given as ordered. Increased WOB with 32 bpm.

## 2025-07-04 NOTE — SIGNIFICANT EVENT
Death Note     Informed of the patient's expected death on the inpatient palliative and hospice unit. The patient was seen and examined. After one minute auscultation, no spontaneous heartbeat or respiration noted. No withdrawal to stimulation. Patient was pronounced dead at 07/04/2025 at 9:53am     Cause of death: Severe hypoxic-ischemic encephalopathy   Estimated onset since: 2 weeks  Tobacco use: No    Wife and sister in law at bedside during death pronouncement. Condolences expressed.  Will call  for support..     Please send LEERS to: [email or web address]

## 2025-07-07 NOTE — ASSESSMENT & PLAN NOTE
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 Went for IVC removal and thrombectomy and patient coded. Subsequently, had poor cognitive recovery. Following goals of care, family opted for compassionate extubation on 6/28, moved to HPU for comfort-focused care and consideration of inpatient hospice admission on 6/29    Interval update 07/07/2025 - transferred to HPU yesterday; eligible for [facility] hospice 2/2 increased care needs and inability to meet needs in another setting    Hospice diagnosis- severe hypoxic-ischemic encephalopathy    Need for inpatient care-  Intensive patient and family support. Requiring IV comfort medications for pain behaviors, respiratory distress, and agitation. Likely prognosis of Days to short weeks    Dispo: anticipate EOL care on the unit; anticipate admitting to [facility] level of hospice    Symptom Assessment  - Pain/ pain behaviors: controlled  - Dyspnea/tachypnea: controlled  - Agitation: controlled  - Mentation: minimally responsive    Plan of care:  Tachypnea/dyspnea/pain behaviors:   -non pharm measures such as having bedside fan and cooler room temps  -morphine 2mgIV q1h (6 OME/dose) IV every hour as needed for pain, dyspnea, or pain behaviors     Agitation:   -treat with opioids initially  -followed by lorazepam 1mg q2hr PRN    Uncomfortable secretions:  -glycopyrrolate 0.4mg IV q4hr PRN     GI:   -will not prioritize at end of life    Patient/family support  -will engage spiritual care and palliative medicine chaplains to assist family in coping and spiritual support in this difficult time

## 2025-07-07 NOTE — DISCHARGE SUMMARY
[facility]  Palliative Medicine  Discharge Summary      Patient Name: Franko Smith  MRN: [id number]  Admission Date: 6/20/2025  Hospital Length of Stay: 9 days  Discharge Date and Time: 6/30/2025  2:05 PM  Attending Physician: Hayley att. providers found   Discharging Provider: Robbin Londono MD  Primary Care Provider: Hayley, Primary Doctor    HPI:   Mr Franko Smith is an 79 year old male with prostate CA (diagnosed 2023 s/p radiation), PE, who presents to [facility] ED from [facility] rehab with increased nausea/vomiting. 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 Initial workup revealed possible clot burden around IVC. During attempted IVC removal and thrombectomy, patient coded. Subsequently intubated and sedated. Exam remained poor for the following days in the ICU. Following goals of care, patient underwent compassionate extubation on 6/28. Palliative medicine consulted for [facility] transfer. Transferred to [facility] on 6/29 for comfort-focused care and consideration of hospice admission    The pts needs continued to escalate and the pt was admitted to hospice GIP level of care    * No surgery found *      Hospital Course:   No notes on file     Goals of Care Treatment Preferences:  Code Status: DNR          What is most important right now is to focus on quality of life, even if it means sacrificing a little time, improvement in condition but with limits to invasive therapies.         Consults:   Consults (From admission, onward)          Status Ordering Provider     Inpatient consult to Palliative Care  Once        Provider:  (Not yet assigned)    Completed TATIANA OVALLE     Inpatient consult to Registered Dietitian/Nutritionist  Once        Provider:  (Not yet assigned)    Completed MICKY URIBE     Inpatient consult to Interventional Radiology  Once        Provider:  (Not yet assigned)     Completed MICKY URIBE     Inpatient consult to Hematology  Once        Provider:  (Not yet assigned)    Completed МАРИЯ SNIDER     Inpatient consult to Physical Medicine Rehab  Once        Provider:  (Not yet assigned)    Completed МАРИЯ SNIDER     Inpatient consult to Neurology Services (Vascular Neurology)  Once        Provider:  (Not yet assigned)    Completed CARSON DYSON     Inpatient consult to Neuro ICU  Once        Provider:  (Not yet assigned)    Completed CARSON DYSON            Palliative Care  Palliative care encounter  See ACP 6/29    Patient is a 79 year old male with prostate cancer and recent admit for 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 Went for IVC removal and thrombectomy and patient coded. Subsequently, had poor cognitive recovery. Following goals of care, family opted for compassionate extubation on 6/28, moved to [facility] for comfort-focused care and consideration of inpatient hospice admission on 6/29    Interval update 07/07/2025 - transferred to [facility] yesterday; eligible for [facility] hospice 2/2 increased care needs and inability to meet needs in another setting    Hospice diagnosis- severe hypoxic-ischemic encephalopathy    Need for inpatient care-  Intensive patient and family support. Requiring IV comfort medications for pain behaviors, respiratory distress, and agitation. Likely prognosis of Days to short weeks    Dispo: anticipate EOL care on the unit; anticipate admitting to [facility] level of hospice    Symptom Assessment  - Pain/ pain behaviors: controlled  - Dyspnea/tachypnea: controlled  - Agitation: controlled  - Mentation: minimally responsive    Plan of care:  Tachypnea/dyspnea/pain behaviors:   -non pharm measures such as having bedside fan and cooler room temps  -morphine 2mgIV q1h (6 OME/dose) IV every hour as needed for pain, dyspnea, or pain  behaviors     Agitation:   -treat with opioids initially  -followed by lorazepam 1mg q2hr PRN    Uncomfortable secretions:  -glycopyrrolate 0.4mg IV q4hr PRN     GI:   -will not prioritize at end of life    Patient/family support  -will engage spiritual care and palliative medicine chaplains to assist family in coping and spiritual support in this difficult time        Final Active Diagnoses:    Diagnosis Date Noted POA    PRINCIPAL PROBLEM:  ICH (intracerebral hemorrhage) [I61.9] 06/21/2025 Yes    Palliative care encounter [Z51.5] 06/29/2025 Not Applicable    Comfort measures only status [Z51.5] 06/28/2025 Not Applicable    Severe hypoxic-ischemic encephalopathy [P91.63] 06/27/2025 No    Goals of care, counseling/discussion [Z71.89] 06/26/2025 Not Applicable    Thrombocytopenia [D69.6] 06/25/2025 Yes    Right leg swelling [M79.89] 06/25/2025 No    Acute on chronic anemia [D64.9] 06/25/2025 Yes    Encephalopathy [G93.40] 06/24/2025 No    ZOE (acute kidney injury) [N17.9] 06/24/2025 No    Transaminitis [R74.01] 06/24/2025 No    Cardiac arrest [I46.9] 06/23/2025 No    DIC (disseminated intravascular coagulation) [D65] 06/23/2025 No    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] 06/23/2025 Yes    Shock [R57.9] 06/23/2025 No    On mechanically assisted ventilation [Z99.11] 06/23/2025 Not Applicable    Acute respiratory failure with hypoxia [J96.01] 06/23/2025 No    Pleural effusion [J90] 06/23/2025 No    Numbness and tingling of right upper extremity [R20.0, R20.2] 06/21/2025 Yes    Deep vein thrombosis (DVT) of lower extremity [I82.409] 06/21/2025 Yes    Prostate cancer [C61] 09/21/2023 Yes    Diabetes mellitus [E11.9] 07/28/2022 Yes    Gastro-esophageal reflux disease without esophagitis [K21.9] 07/28/2022 Yes    Hyperlipidemia [E78.5] 07/28/2022 Yes    Essential hypertension [I10] 02/25/2016 Yes      Problems Resolved During this Admission:       Discharged Condition: poor    Disposition: Hospice/Medical  Facility    Follow Up:    Patient Instructions:   No discharge procedures on file.    Significant Diagnostic Studies: N/A    Pending Diagnostic Studies:       Procedure Component Value Units Date/Time    CBC auto differential [[id number]] Collected: 06/21/25 2357    Order Status: Sent Lab Status: No result     Specimen: Blood     Narrative:      The following orders were created for panel order CBC auto differential.  Procedure                               Abnormality         Status                     ---------                               -----------         ------                     CBC with Differential[[id number]]                                                        Please view results for these tests on the individual orders.    CBC with Differential [[id number]] Collected: 06/21/25 2357    Order Status: Sent Lab Status: No result     Specimen: Blood            Medications:  None    Indwelling Lines/Drains at time of discharge:   Lines/Drains/Airways       None                   Robbin Londono MD  Department of Palliative Medicine  [facility] - [facility] Pall[facility]

## 2025-07-11 ENCOUNTER — TELEPHONE (OUTPATIENT)
Dept: RADIATION ONCOLOGY | Facility: CLINIC | Age: 80
End: 2025-07-11
Payer: MEDICARE

## 2025-07-11 NOTE — TELEPHONE ENCOUNTER
----- Message from Cordelia Robbins sent at 7/11/2025 11:49 AM CDT -----  Regarding: Wife concerned  Hey Ms. Smith would like to speak with you concerning the death of her  if you can give her a call back [phone]                  Thanks Cordelia